# Patient Record
Sex: FEMALE | Race: WHITE | Employment: UNEMPLOYED | URBAN - METROPOLITAN AREA
[De-identification: names, ages, dates, MRNs, and addresses within clinical notes are randomized per-mention and may not be internally consistent; named-entity substitution may affect disease eponyms.]

---

## 2022-07-20 ENCOUNTER — HOSPITAL ENCOUNTER (OUTPATIENT)
Dept: WOUND CARE | Age: 27
Discharge: HOME | End: 2022-07-20
Payer: COMMERCIAL

## 2022-07-20 VITALS
HEART RATE: 96 BPM | DIASTOLIC BLOOD PRESSURE: 76 MMHG | TEMPERATURE: 96 F | RESPIRATION RATE: 16 BRPM | SYSTOLIC BLOOD PRESSURE: 108 MMHG

## 2022-07-20 DIAGNOSIS — E11.621 DIABETIC ULCER OF RIGHT GREAT TOE (HCC): ICD-10-CM

## 2022-07-20 DIAGNOSIS — L97.519 DIABETIC ULCER OF RIGHT GREAT TOE (HCC): ICD-10-CM

## 2022-07-20 DIAGNOSIS — L97.512 CHRONIC ULCER OF GREAT TOE OF RIGHT FOOT WITH FAT LAYER EXPOSED (HCC): ICD-10-CM

## 2022-07-20 PROCEDURE — 99203 OFFICE O/P NEW LOW 30 MIN: CPT | Performed by: NURSE PRACTITIONER

## 2022-07-20 PROCEDURE — 99213 OFFICE O/P EST LOW 20 MIN: CPT

## 2022-07-20 PROCEDURE — 11042 DBRDMT SUBQ TIS 1ST 20SQCM/<: CPT

## 2022-07-20 PROCEDURE — 11042 DBRDMT SUBQ TIS 1ST 20SQCM/<: CPT | Performed by: NURSE PRACTITIONER

## 2022-07-20 RX ORDER — CLOBETASOL PROPIONATE 0.5 MG/G
OINTMENT TOPICAL ONCE
Status: CANCELLED | OUTPATIENT
Start: 2022-07-20 | End: 2022-07-20

## 2022-07-20 RX ORDER — LIDOCAINE HYDROCHLORIDE 20 MG/ML
JELLY TOPICAL ONCE
Status: CANCELLED | OUTPATIENT
Start: 2022-07-20 | End: 2022-07-20

## 2022-07-20 RX ORDER — BETAMETHASONE DIPROPIONATE 0.05 %
OINTMENT (GRAM) TOPICAL ONCE
Status: CANCELLED | OUTPATIENT
Start: 2022-07-20 | End: 2022-07-20

## 2022-07-20 RX ORDER — BACITRACIN ZINC AND POLYMYXIN B SULFATE 500; 1000 [USP'U]/G; [USP'U]/G
OINTMENT TOPICAL ONCE
Status: CANCELLED | OUTPATIENT
Start: 2022-07-20 | End: 2022-07-20

## 2022-07-20 RX ORDER — LIDOCAINE 40 MG/G
CREAM TOPICAL ONCE
Status: CANCELLED | OUTPATIENT
Start: 2022-07-20 | End: 2022-07-20

## 2022-07-20 RX ORDER — GINSENG 100 MG
CAPSULE ORAL ONCE
Status: CANCELLED | OUTPATIENT
Start: 2022-07-20 | End: 2022-07-20

## 2022-07-20 RX ORDER — GENTAMICIN SULFATE 1 MG/G
OINTMENT TOPICAL ONCE
Status: CANCELLED | OUTPATIENT
Start: 2022-07-20 | End: 2022-07-20

## 2022-07-20 RX ORDER — LIDOCAINE 50 MG/G
OINTMENT TOPICAL ONCE
Status: CANCELLED | OUTPATIENT
Start: 2022-07-20 | End: 2022-07-20

## 2022-07-20 RX ORDER — LIDOCAINE HYDROCHLORIDE 40 MG/ML
SOLUTION TOPICAL ONCE
Status: CANCELLED | OUTPATIENT
Start: 2022-07-20 | End: 2022-07-20

## 2022-07-20 RX ORDER — BACITRACIN, NEOMYCIN, POLYMYXIN B 400; 3.5; 5 [USP'U]/G; MG/G; [USP'U]/G
OINTMENT TOPICAL ONCE
Status: CANCELLED | OUTPATIENT
Start: 2022-07-20 | End: 2022-07-20

## 2022-07-20 ASSESSMENT — PAIN - FUNCTIONAL ASSESSMENT: PAIN_FUNCTIONAL_ASSESSMENT: PREVENTS OR INTERFERES SOME ACTIVE ACTIVITIES AND ADLS

## 2022-07-20 ASSESSMENT — PAIN DESCRIPTION - LOCATION: LOCATION: TOE (COMMENT WHICH ONE)

## 2022-07-20 ASSESSMENT — PAIN SCALES - GENERAL: PAINLEVEL_OUTOF10: 4

## 2022-07-20 ASSESSMENT — PAIN DESCRIPTION - ORIENTATION: ORIENTATION: RIGHT

## 2022-07-20 ASSESSMENT — PAIN DESCRIPTION - DESCRIPTORS: DESCRIPTORS: SHARP;BURNING;STABBING

## 2022-07-20 ASSESSMENT — PAIN DESCRIPTION - ONSET: ONSET: ON-GOING

## 2022-07-20 ASSESSMENT — PAIN DESCRIPTION - FREQUENCY: FREQUENCY: INTERMITTENT

## 2022-07-20 ASSESSMENT — PAIN DESCRIPTION - PAIN TYPE: TYPE: ACUTE PAIN

## 2022-07-20 NOTE — DISCHARGE INSTRUCTIONS
PHYSICIAN ORDERS AND DISCHARGE INSTRUCTIONS    NOTE: Upon discharge from the [address], you will receive a patient experience survey. We would be grateful if you would take the time to fill this survey out. Wound care order history:     MAX's   Right       Left    Date:   Cultures:     Grafts:     Antibiotics:        Continuing wound care orders and information:                Residence:                Continue home health care with:    Your wound-care supplies will be provided by: Wound cleansing:     Do not scrub or use excessive force. Wash hands with soap and water before and after dressing changes. Prior to applying a clean dressing, cleanse wound with normal saline, wound cleanser, or mild soap and water. Ask the physician or nurse before getting the wound(s) wet in a shower    Daily Wound management:   Keep weight off wounds and reposition every 2 hours. Avoid standing for long periods of time. Apply wraps/stockings in AM and remove at bedtime. If swelling is present, elevate legs to the level of the heart or above for 30 minutes 4-5 times a day and/or when sitting. When taking antibiotics take entire prescription as ordered by physician do not stop taking until medicine is all gone. Orders for this week: 7/20/22       [facility] [address] [place] [address] in [facility] with soap and water, pat dry. Apply Gentamicin and Stimulen powder to wound bed. Cover with ca alginate and folded 4x4 gauze. Secure with 1\" coban. Change daily. Follow up with Shanda Knox CNP in 1 week in the wound care center. Call [phone] for any questions or concerns.   Date__________   Time____________

## 2022-07-20 NOTE — PROGRESS NOTES
N/A  Severity of pain:  0 / 10   Modifying Factors: diabetes  Associated Signs/Symptoms: erythema, drainage, and numbness        PAST MEDICAL HISTORY        Diagnosis Date    Diabetes mellitus ([facility] [address]        PAST SURGICAL HISTORY    History reviewed. No pertinent surgical history. FAMILY HISTORY    Family History   Problem Relation Age of Onset    Diabetes Father     Heart Disease Father     Depression Maternal Grandmother     Depression Maternal Grandfather     Depression Paternal Grandmother     Depression Paternal Grandfather        SOCIAL HISTORY    Social History     Tobacco Use    Smoking status: Former     Types: Cigarettes     Quit date: 2013     Years since quittin.5    Smokeless tobacco: Never    Tobacco comments:     EDUCATED DM II SLOWS WOUND HEALING   Substance Use Topics    Alcohol use: No    Drug use: No       ALLERGIES    No Known Allergies    MEDICATIONS    Current Outpatient Medications on File Prior to Encounter   Medication Sig Dispense Refill    naproxen (NAPROSYN) 500 MG tablet Take 1 tablet by mouth 2 times daily as needed for Pain 20 tablet 0    acetaminophen (APAP EXTRA STRENGTH) 500 MG tablet Take 1 tablet by mouth every 6 hours as needed for Pain 120 tablet 3     No current facility-administered medications on file prior to encounter.        PROBLEM LIST    Patient Active Problem List   Diagnosis    WD-Diabetic ulcer of right great toe ([facility] [address]    WD-Chronic ulcer of great toe of right foot with fat layer exposed ([facility] [address]       REVIEW OF SYSTEMS    Constitutional: negative for anorexia, chills, fatigue, fevers, malaise, sweats, and weight loss  Respiratory: negative for pleurisy/chest pain, pneumonia, shortness of breath, sputum, stridor, and wheezing  Cardiovascular: negative for near-syncope, orthopnea, palpitations, paroxysmal nocturnal dyspnea, syncope, and tachypnea  Integument/breast: positive for skin lesion(s)      Objective:      /76   Pulse 96   Temp (!) 96 °F (35.6 °C) (Temporal)   Resp 16     PHYSICAL EXAM  General Appearance: alert and oriented to person, place and time, well-developed and well-nourished, in no acute distress  Pulmonary/Chest: clear to auscultation bilaterally- no wheezes, rales or rhonchi, normal air movement, no respiratory distress and no chest wall tenderness  Cardiovascular: normal rate, normal S1 and S2, no gallops, intact distal pulses, and no carotid bruits  Dermatologic exam: Visual inspection of the periwound reveals the skin to be normal in turgor and texture, dry, and coarse  Wound exam: see wound description below in procedure note      Assessment:       Itzel Lopez  appears to have a non-healing wound of the right great toe . The etiology of the wound is felt to be diabetic. There are multiple complicating factors including diabetes, poor glucose control, and obesity. A comprehensive wound management program would be helpful to heal this wound. Assessments completed include fall risk and nutritional, functional,and psychological status. At this time appropriate care would include: periodic debridement and wound care as below. Problem List Items Addressed This Visit          Endocrine    WD-Diabetic ulcer of right great toe ([address]       Other    WD-Chronic ulcer of great toe of right foot with fat layer exposed ([address]         Procedure Note    Indications:  Based on my examination of this patient's wound(s) today, sharp excision into necrotic subcutaneous tissue is required to promote healing and evaluate the extent of previous healing. Performed by: CARMEN Zuniga CNP    Consent obtained: Yes    Time out taken:  Yes    Pain Control: N/A      Debridement:Excisional Debridement    Using #15 blade scalpel the wound(s) was/were sharply debrided down through and including the removal of subcutaneous tissue.         Devitalized Tissue Debrided:  fibrin, biofilm, slough, and callus    Pre Debridement Measurements:  Are located in the Wound Documentation Flow Sheet    All active wounds listed below with today's date are evaluated  Wound(s)    debrided this date include # : 1     Post  Debridement Measurements:  Wound 07/20/22 Toe (Comment  which one) Anterior;Right #1 GR (Active)   Wound Image   07/20/22 1313   Wound Etiology Diabetic 07/20/22 1313   Wound Cleansed Wound cleanser 07/20/22 1313   Wound Length (cm) 2 cm 07/20/22 1313   Wound Width (cm) 0.5 cm 07/20/22 1313   Wound Depth (cm) 0.1 cm 07/20/22 1313   Wound Surface Area (cm^2) 1 cm^2 07/20/22 1313   Wound Volume (cm^3) 0.1 cm^3 07/20/22 1313   Post-Procedure Length (cm) 2 cm 07/20/22 1349   Post-Procedure Width (cm) 0.5 cm 07/20/22 1349   Post-Procedure Depth (cm) 0.1 cm 07/20/22 1349   Post-Procedure Surface Area (cm^2) 1 cm^2 07/20/22 1349   Post-Procedure Volume (cm^3) 0.1 cm^3 07/20/22 1349   Distance Tunneling (cm) 0 cm 07/20/22 1313   Tunneling Position ___ O'Clock 0 07/20/22 1313   Undermining Starts ___ O'Clock 0 07/20/22 1313   Undermining Ends___ O'Clock 0 07/20/22 1313   Undermining Maxium Distance (cm) 0 07/20/22 1313   Wound Assessment Pink/red 07/20/22 1313   Drainage Amount Moderate 07/20/22 1313   Drainage Description Serosanguinous 07/20/22 1313   Odor None 07/20/22 1313   Annika-wound Assessment Dry/flaky 07/20/22 1313   Margins Defined edges 07/20/22 1313   Wound Thickness Description not for Pressure Injury Full thickness 07/20/22 1313   Number of days: 0       Percent of Wound(s) Debrided: 100%    Total  Area  Debrided:  1 sq cm     Bleeding:  Minimal    Hemostasis Achieved:  by pressure    Procedural Pain:  1  / 10     Post Procedural Pain:  0 / 10     Response to treatment:  Well tolerated by patient.     Will add new orders  Patient is ok changing dressing at home  Follow up 1 week and check our progress       Plan:     Discharge Instructions         [address]    NOTE: Upon discharge from the [address], you will receive a patient experience survey. We would be grateful if you would take the time to fill this survey out. Wound care order history:     MAX's   Right       Left    Date:   Cultures:     Grafts:     Antibiotics:        Continuing wound care orders and information:                Residence:                Continue home health care with:    Your wound-care supplies will be provided by: Wound cleansing:     Do not scrub or use excessive force. Wash hands with soap and water before and after dressing changes. Prior to applying a clean dressing, cleanse wound with normal saline, wound cleanser, or mild soap and water. Ask the physician or nurse before getting the wound(s) wet in a shower    Daily Wound management:   Keep weight off wounds and reposition every 2 hours. Avoid standing for long periods of time. Apply wraps/stockings in AM and remove at bedtime. If swelling is present, elevate legs to the level of the heart or above for 30 minutes 4-5 times a day and/or when sitting. When taking antibiotics take entire prescription as ordered by physician do not stop taking until medicine is all gone. Orders for this week: 7/20/22       [facility] [address] [place] [address] in [facility] with soap and water, pat dry. Apply Gentamicin and Stimulen powder to wound bed. Cover with ca alginate and folded 4x4 gauze. Secure with 1\" coban. Change daily. Follow up with Alfredo Murry CNP in 1 week in the wound care center. Call [phone] for any questions or concerns.   Date__________   Time____________          Treatment Note      Written Patient Dismissal Instructions Given          Electronically signed by Agatha Fothergill, APRN - CNP on 7/20/2022 at 1:55 PM

## 2022-07-27 ENCOUNTER — HOSPITAL ENCOUNTER (OUTPATIENT)
Dept: WOUND CARE | Age: 27
Discharge: HOME | End: 2022-07-27
Payer: COMMERCIAL

## 2022-07-27 VITALS — DIASTOLIC BLOOD PRESSURE: 78 MMHG | HEART RATE: 116 BPM | RESPIRATION RATE: 18 BRPM | SYSTOLIC BLOOD PRESSURE: 111 MMHG

## 2022-07-27 DIAGNOSIS — L97.512 CHRONIC ULCER OF GREAT TOE OF RIGHT FOOT WITH FAT LAYER EXPOSED (HCC): ICD-10-CM

## 2022-07-27 DIAGNOSIS — L97.519 DIABETIC ULCER OF RIGHT GREAT TOE (HCC): Primary | ICD-10-CM

## 2022-07-27 DIAGNOSIS — E11.621 DIABETIC ULCER OF RIGHT GREAT TOE (HCC): Primary | ICD-10-CM

## 2022-07-27 PROCEDURE — 11042 DBRDMT SUBQ TIS 1ST 20SQCM/<: CPT

## 2022-07-27 PROCEDURE — 11042 DBRDMT SUBQ TIS 1ST 20SQCM/<: CPT | Performed by: NURSE PRACTITIONER

## 2022-07-27 PROCEDURE — 6370000000 HC RX 637 (ALT 250 FOR IP): Performed by: NURSE PRACTITIONER

## 2022-07-27 RX ORDER — LIDOCAINE HYDROCHLORIDE 20 MG/ML
JELLY TOPICAL ONCE
Status: CANCELLED | OUTPATIENT
Start: 2022-07-27 | End: 2022-07-27

## 2022-07-27 RX ORDER — GINSENG 100 MG
CAPSULE ORAL ONCE
Status: CANCELLED | OUTPATIENT
Start: 2022-07-27 | End: 2022-07-27

## 2022-07-27 RX ORDER — BACITRACIN, NEOMYCIN, POLYMYXIN B 400; 3.5; 5 [USP'U]/G; MG/G; [USP'U]/G
OINTMENT TOPICAL ONCE
Status: CANCELLED | OUTPATIENT
Start: 2022-07-27 | End: 2022-07-27

## 2022-07-27 RX ORDER — CLOBETASOL PROPIONATE 0.5 MG/G
OINTMENT TOPICAL ONCE
Status: CANCELLED | OUTPATIENT
Start: 2022-07-27 | End: 2022-07-27

## 2022-07-27 RX ORDER — GENTAMICIN SULFATE 1 MG/G
OINTMENT TOPICAL ONCE
Status: CANCELLED | OUTPATIENT
Start: 2022-07-27 | End: 2022-07-27

## 2022-07-27 RX ORDER — GENTAMICIN SULFATE 1 MG/G
OINTMENT TOPICAL ONCE
Status: COMPLETED | OUTPATIENT
Start: 2022-07-27 | End: 2022-07-27

## 2022-07-27 RX ORDER — BETAMETHASONE DIPROPIONATE 0.05 %
OINTMENT (GRAM) TOPICAL ONCE
Status: CANCELLED | OUTPATIENT
Start: 2022-07-27 | End: 2022-07-27

## 2022-07-27 RX ORDER — LIDOCAINE HYDROCHLORIDE 40 MG/ML
SOLUTION TOPICAL ONCE
Status: CANCELLED | OUTPATIENT
Start: 2022-07-27 | End: 2022-07-27

## 2022-07-27 RX ORDER — LIDOCAINE 50 MG/G
OINTMENT TOPICAL ONCE
Status: CANCELLED | OUTPATIENT
Start: 2022-07-27 | End: 2022-07-27

## 2022-07-27 RX ORDER — LIDOCAINE 40 MG/G
CREAM TOPICAL ONCE
Status: CANCELLED | OUTPATIENT
Start: 2022-07-27 | End: 2022-07-27

## 2022-07-27 RX ORDER — BACITRACIN ZINC AND POLYMYXIN B SULFATE 500; 1000 [USP'U]/G; [USP'U]/G
OINTMENT TOPICAL ONCE
Status: CANCELLED | OUTPATIENT
Start: 2022-07-27 | End: 2022-07-27

## 2022-07-27 RX ADMIN — GENTAMICIN SULFATE: 1 OINTMENT TOPICAL at 13:34

## 2022-07-27 ASSESSMENT — PAIN DESCRIPTION - ORIENTATION: ORIENTATION: RIGHT

## 2022-07-27 ASSESSMENT — PAIN SCALES - GENERAL: PAINLEVEL_OUTOF10: 0

## 2022-07-27 ASSESSMENT — PAIN DESCRIPTION - LOCATION: LOCATION: TOE (COMMENT WHICH ONE)

## 2022-07-27 NOTE — DISCHARGE INSTRUCTIONS
PHYSICIAN ORDERS AND DISCHARGE INSTRUCTIONS     NOTE: Upon discharge from the [address], you will receive a patient experience survey. We would be grateful if you would take the time to fill this survey out. Wound care order history:                 MAX's   Right       Left               Date:              Cultures:                Grafts:                Antibiotics:           Continuing wound care orders and information:                 Residence:                Continue home health care with:              Your wound-care supplies will be provided by: Wound cleansing:              Do not scrub or use excessive force. Wash hands with soap and water before and after dressing changes. Prior to applying a clean dressing, cleanse wound with normal saline, wound cleanser, or mild soap and water. Ask the physician or nurse before getting the wound(s) wet in a shower     Daily Wound management:              Keep weight off wounds and reposition every 2 hours. Avoid standing for long periods of time. Apply wraps/stockings in AM and remove at bedtime. If swelling is present, elevate legs to the level of the heart or above for 30 minutes 4-5 times a day and/or when sitting. When taking antibiotics take entire prescription as ordered by physician do not stop taking until medicine is all gone. Orders for this week: 7/27/22                  [facility] [address] [place] [address] in [address] with soap and water, pat dry. Apply Gentamicin and Stimulen powder to wound bed. Cover with ca alginate and folded 4x4 gauze. Secure with 1\" coban. Change daily. Follow up with Juan Ingram CNP in 1 week in the wound care center. Call [phone] for any questions or concerns.   Date__________   Time____________

## 2022-07-27 NOTE — PROGRESS NOTES
Wound Care Center Progress Note With Procedure    Fabrizio Rocha  AGE: 32 y.o. GENDER: female  : 1995  EPISODE DATE:  2022     Subjective:     Chief Complaint   Patient presents with    Wound Check     Right toe          HISTORY of PRESENT ILLNESS      Fabrizio Rocha is a 32 y.o. female who presents to the [address] [place] [address] for a visit for evaluation and treatment of Chronic diabetic  ulcer(s) of  Rt. foot hallux. The condition is of moderate severity. The ulcer has been present since 2022. The underlying cause is thought to be diabetes. The patients care to date has included regular wound care in [place] . The patient has significant underlying medical conditions as below. Patient did not get her medications or supplies this week. She is nonetheless improved and wound is improved in size and appearance  We will modify orders and do a better job making sure she gets her supplies and medications     Patient is not a smoker and not on a blood thinner  Patient is a diabetic managed with SubQ insulin and PO meds as well  She moved to this area recently. Was getting outpatient treatment since March of this year  Wound is clean and healthy. Worst phase is past and wound is looking good. Should be able to heal relatively quickly     Diabetes education provided today:     Foot care: advised to wash feet daily, pat dry and apply lotion at night, avoiding between toes. Need to look at feet daily and report to a physician any signs of inflammation or skin damage. Discussed diabetes shoes and socks. Different diabetic medications. Managing high and low sugar readings. Patient educated on the 6 essential components necessary for wound healing: Circulation, Debridements, Proper Dressings and Topical Wound Products, Infection Control, Edema Control and Offloading.      Patient educated on those factors that negatively effect or impact wound healing: smoking, obesity, uncontrolled diabetes, anticoagulant and immunosuppressive regimens, inadequate nutrition, untreated arterial and venous disease if applicable and measures to manage edema. Wound Pain Timing/Severity: none  Quality of pain: N/A  Severity of pain:  0 / 10  Modifying Factors: diabetes  Associated Signs/Symptoms: erythema, drainage, and numbness        PAST MEDICAL HISTORY        Diagnosis Date    Diabetes mellitus ([facility] [address]        PAST SURGICAL HISTORY    History reviewed. No pertinent surgical history. FAMILY HISTORY    Family History   Problem Relation Age of Onset    Diabetes Father     Heart Disease Father     Depression Maternal Grandmother     Depression Maternal Grandfather     Depression Paternal Grandmother     Depression Paternal Grandfather        SOCIAL HISTORY    Social History     Tobacco Use    Smoking status: Former     Types: Cigarettes     Quit date: 2013     Years since quittin.6    Smokeless tobacco: Never    Tobacco comments:     EDUCATED DM II SLOWS WOUND HEALING   Substance Use Topics    Alcohol use: No    Drug use: No       ALLERGIES    No Known Allergies    MEDICATIONS    Current Outpatient Medications on File Prior to Encounter   Medication Sig Dispense Refill    naproxen (NAPROSYN) 500 MG tablet Take 1 tablet by mouth 2 times daily as needed for Pain 20 tablet 0    acetaminophen (APAP EXTRA STRENGTH) 500 MG tablet Take 1 tablet by mouth every 6 hours as needed for Pain 120 tablet 3     No current facility-administered medications on file prior to encounter. REVIEW OF SYSTEMS    Pertinent items are noted in HPI. Constitutional: Negative for systemic symptoms including fever, chills and malaise. Objective:      /78   Pulse (!) 116   Resp 18     PHYSICAL EXAM      General: The patient is in no acute distress. Mental status:  Patient is appropriate, is  oriented to place and plan of care.   Dermatologic exam: Visual inspection of the periwound reveals the skin to be normal in turgor and texture and dry  Wound exam: see wound description below in procedure note      Assessment:     Problem List Items Addressed This Visit          Endocrine    WD-Diabetic ulcer of right great toe ([address] - Primary    Relevant Medications    gentamicin (GARAMYCIN) 0.1 % ointment (Start on 7/27/2022  1:45 PM)    Other Relevant Orders    Initiate Outpatient Wound Care Protocol       Other    WD-Chronic ulcer of great toe of right foot with fat layer exposed ([address]    Relevant Medications    gentamicin (GARAMYCIN) 0.1 % ointment (Start on 7/27/2022  1:45 PM)    Other Relevant Orders    Initiate Outpatient Wound Care Protocol     Procedure Note    Indications:  Based on my examination of this patient's wound(s) today, sharp excision into necrotic subcutaneous tissue is required to promote healing and evaluate the extent of previous healing. Performed by: CARMEN Zuniga CNP    Consent obtained: Yes    Time out taken:  Yes    Pain Control: N/A      Debridement:Excisional Debridement    Using #15 blade scalpel the wound(s) was/were sharply debrided down through and including the removal of subcutaneous tissue.         Devitalized Tissue Debrided:  fibrin, biofilm, slough, and exudate    Pre Debridement Measurements:  Are located in the Wound Documentation Flow Sheet    All active wounds listed below with today's date are evaluated  Wound(s)    debrided this date include # : 1     Post  Debridement Measurements:  Wound 07/20/22 Toe (Comment  which one) Anterior;Right #1 GR (Active)   Wound Image   07/20/22 1313   Wound Etiology Diabetic 07/27/22 1309   Dressing Status New dressing applied 07/20/22 1421   Wound Cleansed Wound cleanser 07/27/22 1309   Offloading for Diabetic Foot Ulcers Offloading ordered 07/27/22 1309   Wound Length (cm) 1.7 cm 07/27/22 1309   Wound Width (cm) 1 cm 07/27/22 1309   Wound Depth (cm) 0.1 cm 07/27/22 1309   Wound Surface Area (cm^2) 1.7 cm^2 07/27/22 1309   Change in Wound Size % (l*w) -70 07/27/22 1309   Wound Volume (cm^3) 0.17 cm^3 07/27/22 1309   Wound Healing % -70 07/27/22 1309   Post-Procedure Length (cm) 1.7 cm 07/27/22 1319   Post-Procedure Width (cm) 0.9 cm 07/27/22 1319   Post-Procedure Depth (cm) 0.1 cm 07/27/22 1319   Post-Procedure Surface Area (cm^2) 1.53 cm^2 07/27/22 1319   Post-Procedure Volume (cm^3) 0.153 cm^3 07/27/22 1319   Distance Tunneling (cm) 0 cm 07/27/22 1309   Tunneling Position ___ O'Clock 0 07/27/22 1309   Undermining Starts ___ O'Clock 0 07/27/22 1309   Undermining Ends___ O'Clock 0 07/27/22 1309   Undermining Maxium Distance (cm) 0 07/27/22 1309   Wound Assessment Pink/red 07/27/22 1309   Drainage Amount Moderate 07/27/22 1309   Drainage Description Serosanguinous 07/27/22 1309   Odor None 07/27/22 1309   Annika-wound Assessment Dry/flaky 07/27/22 1309   Margins Defined edges 07/27/22 1309   Wound Thickness Description not for Pressure Injury Full thickness 07/27/22 1309   Number of days: 7       Percent of Wound(s) Debrided: approximately 100%    Total  Area  Debrided:  1.53 sq cm     Bleeding:  Minimal    Hemostasis Achieved:  by pressure    Procedural Pain:  0  / 10     Post Procedural Pain:  0 / 10     Response to treatment:  Well tolerated by patient. Status of wound progress and description from last visit:   Stable. Sending patient home with leftovers. She has no issues changing dressing at home       Plan:       Discharge Instructions         [address]     NOTE: Upon discharge from the [address], you will receive a patient experience survey. We would be grateful if you would take the time to fill this survey out.      Wound care order history:                 MAX's   Right       Left               Date:              Cultures:                Grafts:                Antibiotics:           Continuing wound care orders and information:                 Residence:                Continue home health care with:              Your wound-care supplies will be provided by: Wound cleansing:              Do not scrub or use excessive force. Wash hands with soap and water before and after dressing changes. Prior to applying a clean dressing, cleanse wound with normal saline, wound cleanser, or mild soap and water. Ask the physician or nurse before getting the wound(s) wet in a shower     Daily Wound management:              Keep weight off wounds and reposition every 2 hours. Avoid standing for long periods of time. Apply wraps/stockings in AM and remove at bedtime. If swelling is present, elevate legs to the level of the heart or above for 30 minutes 4-5 times a day and/or when sitting. When taking antibiotics take entire prescription as ordered by physician do not stop taking until medicine is all gone. Orders for this week: 7/27/22                  [facility] [address] [place] [address] in [address] with soap and water, pat dry. Apply Gentamicin and Stimulen powder to wound bed. Cover with ca alginate and folded 4x4 gauze. Secure with 1\" coban. Change daily. Follow up with Brenda Reed CNP in 1 week in the wound care center. Call [phone] for any questions or concerns.   Date__________   Time____________        Treatment Note      Written Patient Dismissal Instructions Given            Electronically signed by CARMEN Robison CNP on 7/27/2022 at 1:33 PM

## 2022-08-03 ENCOUNTER — HOSPITAL ENCOUNTER (OUTPATIENT)
Dept: WOUND CARE | Age: 27
Discharge: HOME | End: 2022-08-03
Payer: COMMERCIAL

## 2022-08-03 VITALS
RESPIRATION RATE: 20 BRPM | SYSTOLIC BLOOD PRESSURE: 136 MMHG | TEMPERATURE: 97 F | HEART RATE: 128 BPM | DIASTOLIC BLOOD PRESSURE: 78 MMHG

## 2022-08-03 DIAGNOSIS — L97.519 DIABETIC ULCER OF RIGHT GREAT TOE (HCC): ICD-10-CM

## 2022-08-03 DIAGNOSIS — E11.621 DIABETIC ULCER OF RIGHT GREAT TOE (HCC): ICD-10-CM

## 2022-08-03 DIAGNOSIS — L97.512 CHRONIC ULCER OF GREAT TOE OF RIGHT FOOT WITH FAT LAYER EXPOSED (HCC): Primary | ICD-10-CM

## 2022-08-03 PROCEDURE — 11042 DBRDMT SUBQ TIS 1ST 20SQCM/<: CPT

## 2022-08-03 PROCEDURE — 11042 DBRDMT SUBQ TIS 1ST 20SQCM/<: CPT | Performed by: NURSE PRACTITIONER

## 2022-08-03 RX ORDER — LIDOCAINE HYDROCHLORIDE 40 MG/ML
SOLUTION TOPICAL ONCE
Status: CANCELLED | OUTPATIENT
Start: 2022-08-03 | End: 2022-08-03

## 2022-08-03 RX ORDER — GINSENG 100 MG
CAPSULE ORAL ONCE
Status: CANCELLED | OUTPATIENT
Start: 2022-08-03 | End: 2022-08-03

## 2022-08-03 RX ORDER — LIDOCAINE HYDROCHLORIDE 20 MG/ML
JELLY TOPICAL ONCE
Status: CANCELLED | OUTPATIENT
Start: 2022-08-03 | End: 2022-08-03

## 2022-08-03 RX ORDER — GENTAMICIN SULFATE 1 MG/G
OINTMENT TOPICAL ONCE
Status: CANCELLED | OUTPATIENT
Start: 2022-08-03 | End: 2022-08-03

## 2022-08-03 RX ORDER — BACITRACIN, NEOMYCIN, POLYMYXIN B 400; 3.5; 5 [USP'U]/G; MG/G; [USP'U]/G
OINTMENT TOPICAL ONCE
Status: CANCELLED | OUTPATIENT
Start: 2022-08-03 | End: 2022-08-03

## 2022-08-03 RX ORDER — BETAMETHASONE DIPROPIONATE 0.05 %
OINTMENT (GRAM) TOPICAL ONCE
Status: CANCELLED | OUTPATIENT
Start: 2022-08-03 | End: 2022-08-03

## 2022-08-03 RX ORDER — BACITRACIN ZINC AND POLYMYXIN B SULFATE 500; 1000 [USP'U]/G; [USP'U]/G
OINTMENT TOPICAL ONCE
Status: CANCELLED | OUTPATIENT
Start: 2022-08-03 | End: 2022-08-03

## 2022-08-03 RX ORDER — CLOBETASOL PROPIONATE 0.5 MG/G
OINTMENT TOPICAL ONCE
Status: CANCELLED | OUTPATIENT
Start: 2022-08-03 | End: 2022-08-03

## 2022-08-03 RX ORDER — LIDOCAINE 40 MG/G
CREAM TOPICAL ONCE
Status: CANCELLED | OUTPATIENT
Start: 2022-08-03 | End: 2022-08-03

## 2022-08-03 RX ORDER — GENTAMICIN SULFATE 1 MG/G
OINTMENT TOPICAL ONCE
Status: DISCONTINUED | OUTPATIENT
Start: 2022-08-03 | End: 2022-08-04 | Stop reason: HOSPADM

## 2022-08-03 RX ORDER — LIDOCAINE 50 MG/G
OINTMENT TOPICAL ONCE
Status: CANCELLED | OUTPATIENT
Start: 2022-08-03 | End: 2022-08-03

## 2022-08-03 NOTE — DISCHARGE INSTRUCTIONS
PHYSICIAN ORDERS AND DISCHARGE INSTRUCTIONS     NOTE: Upon discharge from the [address], you will receive a patient experience survey. We would be grateful if you would take the time to fill this survey out. Wound care order history:                 MAX's   Right       Left               Date:              Cultures:                Grafts:                Antibiotics:           Continuing wound care orders and information:                 Residence:                Continue home health care with:              Your wound-care supplies will be provided by: Wound cleansing:              Do not scrub or use excessive force. Wash hands with soap and water before and after dressing changes. Prior to applying a clean dressing, cleanse wound with normal saline, wound cleanser, or mild soap and water. Ask the physician or nurse before getting the wound(s) wet in a shower     Daily Wound management:              Keep weight off wounds and reposition every 2 hours. Avoid standing for long periods of time. Apply wraps/stockings in AM and remove at bedtime. If swelling is present, elevate legs to the level of the heart or above for 30 minutes 4-5 times a day and/or when sitting. When taking antibiotics take entire prescription as ordered by physician do not stop taking until medicine is all gone. Orders for this week: 8/3/22                  [facility] [address] [place] [address] in [facility]     Right [facility] (including tip) - Wash with soap and water, pat dry. Apply Gentamicin and Stimulen powder to wound bed. Cover with ca alginate and folded 4x4 gauze. Secure with 1\" coban. Change daily. Follow up with Raffaele Bills CNP in 1 week in the wound care center. Call [phone] for any questions or concerns.   Date__________   Time____________

## 2022-08-10 ENCOUNTER — HOSPITAL ENCOUNTER (OUTPATIENT)
Dept: WOUND CARE | Age: 27
Discharge: HOME | End: 2022-08-10
Payer: COMMERCIAL

## 2022-08-10 VITALS
SYSTOLIC BLOOD PRESSURE: 105 MMHG | RESPIRATION RATE: 20 BRPM | TEMPERATURE: 97.1 F | DIASTOLIC BLOOD PRESSURE: 62 MMHG | HEART RATE: 114 BPM

## 2022-08-10 DIAGNOSIS — E11.621 DIABETIC ULCER OF RIGHT GREAT TOE (HCC): Primary | ICD-10-CM

## 2022-08-10 DIAGNOSIS — L97.519 DIABETIC ULCER OF RIGHT GREAT TOE (HCC): Primary | ICD-10-CM

## 2022-08-10 DIAGNOSIS — L97.512 CHRONIC ULCER OF GREAT TOE OF RIGHT FOOT WITH FAT LAYER EXPOSED (HCC): ICD-10-CM

## 2022-08-10 PROCEDURE — 11042 DBRDMT SUBQ TIS 1ST 20SQCM/<: CPT | Performed by: NURSE PRACTITIONER

## 2022-08-10 PROCEDURE — 11042 DBRDMT SUBQ TIS 1ST 20SQCM/<: CPT

## 2022-08-10 RX ORDER — LIDOCAINE 40 MG/G
CREAM TOPICAL ONCE
Status: CANCELLED | OUTPATIENT
Start: 2022-08-10 | End: 2022-08-10

## 2022-08-10 RX ORDER — BETAMETHASONE DIPROPIONATE 0.05 %
OINTMENT (GRAM) TOPICAL ONCE
Status: CANCELLED | OUTPATIENT
Start: 2022-08-10 | End: 2022-08-10

## 2022-08-10 RX ORDER — LIDOCAINE HYDROCHLORIDE 40 MG/ML
SOLUTION TOPICAL ONCE
Status: CANCELLED | OUTPATIENT
Start: 2022-08-10 | End: 2022-08-10

## 2022-08-10 RX ORDER — BACITRACIN ZINC AND POLYMYXIN B SULFATE 500; 1000 [USP'U]/G; [USP'U]/G
OINTMENT TOPICAL ONCE
Status: CANCELLED | OUTPATIENT
Start: 2022-08-10 | End: 2022-08-10

## 2022-08-10 RX ORDER — CLOBETASOL PROPIONATE 0.5 MG/G
OINTMENT TOPICAL ONCE
Status: CANCELLED | OUTPATIENT
Start: 2022-08-10 | End: 2022-08-10

## 2022-08-10 RX ORDER — LIDOCAINE HYDROCHLORIDE 20 MG/ML
JELLY TOPICAL ONCE
Status: CANCELLED | OUTPATIENT
Start: 2022-08-10 | End: 2022-08-10

## 2022-08-10 RX ORDER — BACITRACIN, NEOMYCIN, POLYMYXIN B 400; 3.5; 5 [USP'U]/G; MG/G; [USP'U]/G
OINTMENT TOPICAL ONCE
Status: CANCELLED | OUTPATIENT
Start: 2022-08-10 | End: 2022-08-10

## 2022-08-10 RX ORDER — LIDOCAINE 50 MG/G
OINTMENT TOPICAL ONCE
Status: CANCELLED | OUTPATIENT
Start: 2022-08-10 | End: 2022-08-10

## 2022-08-10 RX ORDER — GINSENG 100 MG
CAPSULE ORAL ONCE
Status: CANCELLED | OUTPATIENT
Start: 2022-08-10 | End: 2022-08-10

## 2022-08-10 RX ORDER — GENTAMICIN SULFATE 1 MG/G
OINTMENT TOPICAL ONCE
Status: CANCELLED | OUTPATIENT
Start: 2022-08-10 | End: 2022-08-10

## 2022-08-10 ASSESSMENT — PAIN SCALES - GENERAL: PAINLEVEL_OUTOF10: 0

## 2022-08-10 NOTE — PROGRESS NOTES
Wound Care Center Progress Note With Procedure    Durga Bishop  AGE: 32 y.o. GENDER: female  : 1995  EPISODE DATE:  8/10/2022     Subjective:     Chief Complaint   Patient presents with    Wound Check     Right grt toe         HISTORY of PRESENT ILLNESS     Durga Bishop is a 32 y.o. female who presents to the [address] [place] [address] for a visit for evaluation and treatment of Chronic diabetic  ulcer(s) of  Rt. foot hallux. The condition is of moderate severity. The ulcer has been present since 2022. The underlying cause is thought to be diabetes. The patients care to date has included regular wound care in [place] . The patient has significant underlying medical conditions as below. Great improvement this week. No issues to report. Much smaller in size     Patient is not a smoker and not on a blood thinner  Patient is a diabetic managed with SubQ insulin and PO meds as well  She moved to this area recently. Was getting outpatient treatment since March of this year  Wound is clean and healthy. Worst phase is past and wound is looking good. Should be able to heal relatively quickly     Diabetes education provided today:     Foot care: advised to wash feet daily, pat dry and apply lotion at night, avoiding between toes. Need to look at feet daily and report to a physician any signs of inflammation or skin damage. Discussed diabetes shoes and socks. Different diabetic medications. Managing high and low sugar readings. Patient educated on the 6 essential components necessary for wound healing: Circulation, Debridements, Proper Dressings and Topical Wound Products, Infection Control, Edema Control and Offloading.      Patient educated on those factors that negatively effect or impact wound healing: smoking, obesity, uncontrolled diabetes, anticoagulant and immunosuppressive regimens, inadequate nutrition, untreated arterial and venous disease if applicable and measures to manage edema. Wound Pain Timing/Severity: none  Quality of pain: N/A  Severity of pain:  0 / 10  Modifying Factors: diabetes  Associated Signs/Symptoms: erythema, drainage, and numbness        PAST MEDICAL HISTORY        Diagnosis Date    Diabetes mellitus ([address]        PAST SURGICAL HISTORY    History reviewed. No pertinent surgical history. FAMILY HISTORY    Family History   Problem Relation Age of Onset    Diabetes Father     Heart Disease Father     Depression Maternal Grandmother     Depression Maternal Grandfather     Depression Paternal Grandmother     Depression Paternal Grandfather        SOCIAL HISTORY    Social History     Tobacco Use    Smoking status: Former     Types: Cigarettes     Quit date: 2013     Years since quittin.6    Smokeless tobacco: Never    Tobacco comments:     EDUCATED DM II SLOWS WOUND HEALING   Substance Use Topics    Alcohol use: No    Drug use: No       ALLERGIES    No Known Allergies    MEDICATIONS    Current Outpatient Medications on File Prior to Encounter   Medication Sig Dispense Refill    naproxen (NAPROSYN) 500 MG tablet Take 1 tablet by mouth 2 times daily as needed for Pain 20 tablet 0    acetaminophen (APAP EXTRA STRENGTH) 500 MG tablet Take 1 tablet by mouth every 6 hours as needed for Pain 120 tablet 3     No current facility-administered medications on file prior to encounter. REVIEW OF SYSTEMS    Pertinent items are noted in HPI. Constitutional: Negative for systemic symptoms including fever, chills and malaise. Objective:      /62   Pulse (!) 114   Temp 97.1 °F (36.2 °C) (Temporal)   Resp 20     PHYSICAL EXAM      General: The patient is in no acute distress. Mental status:  Patient is appropriate, is  oriented to place and plan of care.   Dermatologic exam: Visual inspection of the periwound reveals the skin to be normal in turgor and texture and dry  Wound exam: see wound description below in procedure note      Assessment: Problem List Items Addressed This Visit          Endocrine    WD-Diabetic ulcer of right great toe ([facility] - Primary    Relevant Orders    Initiate Outpatient Wound Care Protocol       Other    WD-Chronic ulcer of great toe of right foot with fat layer exposed ([facility]    Relevant Orders    Initiate Outpatient Wound Care Protocol     Procedure Note    Indications:  Based on my examination of this patient's wound(s) today, sharp excision into necrotic subcutaneous tissue is required to promote healing and evaluate the extent of previous healing. Performed by: CARMEN Robison CNP    Consent obtained: Yes    Time out taken:  Yes    Pain Control: N/A      Debridement:Excisional Debridement    Using scissors and forceps the wound(s) was/were sharply debrided down through and including the removal of subcutaneous tissue.         Devitalized Tissue Debrided:  fibrin, biofilm, slough, and callus    Pre Debridement Measurements:  Are located in the Wound Documentation Flow Sheet    All active wounds listed below with today's date are evaluated  Wound(s)    debrided this date include # : 1     Post  Debridement Measurements:  Wound 07/20/22 Toe (Comment  which one) Anterior;Right #1 GR (Active)   Wound Image   08/03/22 1309   Wound Etiology Diabetic 08/10/22 1317   Dressing Status New dressing applied 08/03/22 1337   Wound Cleansed Wound cleanser 08/10/22 1317   Offloading for Diabetic Foot Ulcers Offloading ordered 08/10/22 1317   Wound Length (cm) 0.9 cm 08/10/22 1317   Wound Width (cm) 0.5 cm 08/10/22 1317   Wound Depth (cm) 0.1 cm 08/10/22 1317   Wound Surface Area (cm^2) 0.45 cm^2 08/10/22 1317   Change in Wound Size % (l*w) 55 08/10/22 1317   Wound Volume (cm^3) 0.045 cm^3 08/10/22 1317   Wound Healing % 55 08/10/22 1317   Post-Procedure Length (cm) 0.9 cm 08/10/22 1334   Post-Procedure Width (cm) 0.5 cm 08/10/22 1334   Post-Procedure Depth (cm) 0.1 cm 08/10/22 1334   Post-Procedure Surface Area (cm^2) 0.45 cm^2 08/10/22 1334   Post-Procedure Volume (cm^3) 0.045 cm^3 08/10/22 1334   Distance Tunneling (cm) 0 cm 08/10/22 1317   Tunneling Position ___ O'Clock 0 08/10/22 1317   Undermining Starts ___ O'Clock 0 08/10/22 1317   Undermining Ends___ O'Clock 0 08/10/22 1317   Undermining Maxium Distance (cm) 0 08/10/22 1317   Wound Assessment Pink/red 08/10/22 1317   Drainage Amount Small 08/10/22 1317   Drainage Description Serosanguinous 08/10/22 1317   Odor None 08/10/22 1317   Annika-wound Assessment Intact; Hyperkeratosis (callous) 08/10/22 1317   Margins Defined edges 08/10/22 1317   Wound Thickness Description not for Pressure Injury Full thickness 08/10/22 1317   Number of days: 21       Percent of Wound(s) Debrided: approximately 100%    Total  Area  Debrided:  0.45 sq cm     Bleeding:  Minimal    Hemostasis Achieved:  by pressure    Procedural Pain:  0  / 10     Post Procedural Pain:  0 / 10     Response to treatment:  Well tolerated by patient. Status of wound progress and description from last visit:   Much improved. Continue plan of care for now and follow up 1 week         Plan:       Discharge Instructions           Discharge Instructions           [address]     NOTE: Upon discharge from the [address], you will receive a patient experience survey. We would be grateful if you would take the time to fill this survey out. Wound care order history:                 MAX's   Right       Left               Date:              Cultures:                Grafts:                Antibiotics:           Continuing wound care orders and information:                 Residence:                Continue home health care with:              Your wound-care supplies will be provided by: Wound cleansing:              Do not scrub or use excessive force. Wash hands with soap and water before and after dressing changes.               Prior to applying a clean dressing, cleanse wound with normal saline, wound cleanser, or mild soap and water. Ask the physician or nurse before getting the wound(s) wet in a shower     Daily Wound management:              Keep weight off wounds and reposition every 2 hours. Avoid standing for long periods of time. Apply wraps/stockings in AM and remove at bedtime. If swelling is present, elevate legs to the level of the heart or above for 30 minutes 4-5 times a day and/or when sitting. When taking antibiotics take entire prescription as ordered by physician do not stop taking until medicine is all gone. Orders for this week: 8/10/22                  [facility] on [address] [place] [address] in [facility]     Right [place] Toe (including tip) - Wash with soap and water, pat dry. Apply Gentamicin and Stimulen powder to wound bed. Cover with ca alginate and folded 4x4 gauze. Secure with 1\" coban. Change daily. Follow up with Marie Pitt CNP in 1 week in the wound care center. Call [phone] for any questions or concerns.   Date__________   Time____________              Treatment Note      Written Patient Dismissal Instructions Given            Electronically signed by CARMEN Alegria CNP on 8/10/2022 at 1:39 PM

## 2022-08-10 NOTE — DISCHARGE INSTRUCTIONS
Discharge Instructions           PHYSICIAN ORDERS AND DISCHARGE INSTRUCTIONS     NOTE: Upon discharge from the [address], you will receive a patient experience survey. We would be grateful if you would take the time to fill this survey out. Wound care order history:                 MAX's   Right       Left               Date:              Cultures:                Grafts:                Antibiotics:           Continuing wound care orders and information:                 Residence:                Continue home health care with:              Your wound-care supplies will be provided by: Wound cleansing:              Do not scrub or use excessive force. Wash hands with soap and water before and after dressing changes. Prior to applying a clean dressing, cleanse wound with normal saline, wound cleanser, or mild soap and water. Ask the physician or nurse before getting the wound(s) wet in a shower     Daily Wound management:              Keep weight off wounds and reposition every 2 hours. Avoid standing for long periods of time. Apply wraps/stockings in AM and remove at bedtime. If swelling is present, elevate legs to the level of the heart or above for 30 minutes 4-5 times a day and/or when sitting. When taking antibiotics take entire prescription as ordered by physician do not stop taking until medicine is all gone. Orders for this week: 8/10/22                  [facility]     Right [place] Toe (including tip) - Wash with soap and water, pat dry. Apply Gentamicin and Stimulen powder to wound bed. Cover with ca alginate and folded 4x4 gauze. Secure with 1\" coban. Change daily. Follow up with Porfirio Pitt CNP in 1 week in the wound care center.   Call [phone] for any questions or

## 2022-08-17 ENCOUNTER — HOSPITAL ENCOUNTER (OUTPATIENT)
Dept: WOUND CARE | Age: 27
Discharge: HOME | End: 2022-08-17
Payer: COMMERCIAL

## 2022-08-17 VITALS
HEART RATE: 109 BPM | TEMPERATURE: 97.1 F | RESPIRATION RATE: 20 BRPM | SYSTOLIC BLOOD PRESSURE: 119 MMHG | DIASTOLIC BLOOD PRESSURE: 83 MMHG

## 2022-08-17 DIAGNOSIS — E11.621 DIABETIC ULCER OF RIGHT GREAT TOE (HCC): Primary | ICD-10-CM

## 2022-08-17 DIAGNOSIS — L97.519 DIABETIC ULCER OF RIGHT GREAT TOE (HCC): Primary | ICD-10-CM

## 2022-08-17 DIAGNOSIS — L97.512 CHRONIC ULCER OF GREAT TOE OF RIGHT FOOT WITH FAT LAYER EXPOSED (HCC): ICD-10-CM

## 2022-08-17 PROCEDURE — 11042 DBRDMT SUBQ TIS 1ST 20SQCM/<: CPT

## 2022-08-17 PROCEDURE — 11042 DBRDMT SUBQ TIS 1ST 20SQCM/<: CPT | Performed by: NURSE PRACTITIONER

## 2022-08-17 RX ORDER — LIDOCAINE HYDROCHLORIDE 20 MG/ML
JELLY TOPICAL ONCE
Status: CANCELLED | OUTPATIENT
Start: 2022-08-17 | End: 2022-08-17

## 2022-08-17 RX ORDER — BACITRACIN ZINC AND POLYMYXIN B SULFATE 500; 1000 [USP'U]/G; [USP'U]/G
OINTMENT TOPICAL ONCE
Status: CANCELLED | OUTPATIENT
Start: 2022-08-17 | End: 2022-08-17

## 2022-08-17 RX ORDER — GENTAMICIN SULFATE 1 MG/G
OINTMENT TOPICAL ONCE
Status: DISCONTINUED | OUTPATIENT
Start: 2022-08-17 | End: 2022-08-18 | Stop reason: HOSPADM

## 2022-08-17 RX ORDER — LIDOCAINE HYDROCHLORIDE 40 MG/ML
SOLUTION TOPICAL ONCE
Status: CANCELLED | OUTPATIENT
Start: 2022-08-17 | End: 2022-08-17

## 2022-08-17 RX ORDER — BETAMETHASONE DIPROPIONATE 0.05 %
OINTMENT (GRAM) TOPICAL ONCE
Status: CANCELLED | OUTPATIENT
Start: 2022-08-17 | End: 2022-08-17

## 2022-08-17 RX ORDER — CLOBETASOL PROPIONATE 0.5 MG/G
OINTMENT TOPICAL ONCE
Status: CANCELLED | OUTPATIENT
Start: 2022-08-17 | End: 2022-08-17

## 2022-08-17 RX ORDER — GENTAMICIN SULFATE 1 MG/G
OINTMENT TOPICAL ONCE
Status: CANCELLED | OUTPATIENT
Start: 2022-08-17 | End: 2022-08-17

## 2022-08-17 RX ORDER — LIDOCAINE 50 MG/G
OINTMENT TOPICAL ONCE
Status: CANCELLED | OUTPATIENT
Start: 2022-08-17 | End: 2022-08-17

## 2022-08-17 RX ORDER — BACITRACIN, NEOMYCIN, POLYMYXIN B 400; 3.5; 5 [USP'U]/G; MG/G; [USP'U]/G
OINTMENT TOPICAL ONCE
Status: CANCELLED | OUTPATIENT
Start: 2022-08-17 | End: 2022-08-17

## 2022-08-17 RX ORDER — GINSENG 100 MG
CAPSULE ORAL ONCE
Status: CANCELLED | OUTPATIENT
Start: 2022-08-17 | End: 2022-08-17

## 2022-08-17 RX ORDER — LIDOCAINE 40 MG/G
CREAM TOPICAL ONCE
Status: CANCELLED | OUTPATIENT
Start: 2022-08-17 | End: 2022-08-17

## 2022-08-17 NOTE — DISCHARGE INSTRUCTIONS
PHYSICIAN ORDERS AND DISCHARGE INSTRUCTIONS     NOTE: Upon discharge from the [address], you will receive a patient experience survey. We would be grateful if you would take the time to fill this survey out. Wound care order history:                 MAX's   Right       Left               Date:              Cultures:                Grafts:                Antibiotics:           Continuing wound care orders and information:                 Residence:                Continue home health care with:              Your wound-care supplies will be provided by: Wound cleansing:              Do not scrub or use excessive force. Wash hands with soap and water before and after dressing changes. Prior to applying a clean dressing, cleanse wound with normal saline, wound cleanser, or mild soap and water. Ask the physician or nurse before getting the wound(s) wet in a shower     Daily Wound management:              Keep weight off wounds and reposition every 2 hours. Avoid standing for long periods of time. Apply wraps/stockings in AM and remove at bedtime. If swelling is present, elevate legs to the level of the heart or above for 30 minutes 4-5 times a day and/or when sitting. When taking antibiotics take entire prescription as ordered by physician do not stop taking until medicine is all gone. Orders for this week: 8/17/22                  [facility] [address] [place] [address] in [address] with soap and water, pat dry. Apply Gentamicin and Stimulen powder to wound bed. Cover with ca alginate and folded 4x4 gauze. Secure with 1\" coban. Change daily. Follow up with Cynthia Gamble CNP in 1 week in the wound care center. Call [phone] for any questions or concerns.   Date__________   Time____________

## 2022-08-17 NOTE — PROGRESS NOTES
Wound Care Center Progress Note With Procedure    Shine Israel  AGE: 32 y.o. GENDER: female  : 1995  EPISODE DATE:  2022     Subjective:     Chief Complaint   Patient presents with    Wound Check     Rt gr toe         HISTORY of PRESENT ILLNESS     Shine Israel is a 32 y.o. female who presents to the [address] [place] [address] for a visit for evaluation and treatment of Chronic diabetic  ulcer(s) of  Rt. foot hallux. The condition is of moderate severity. The ulcer has been present since 2022. The underlying cause is thought to be diabetes. The patients care to date has included regular wound care in [place] . The patient has significant underlying medical conditions as below. Much smaller this week and nearly closed  Hope to discharge in the next weeks     Foot care: advised to wash feet daily, pat dry and apply lotion at night, avoiding between toes. Need to look at feet daily and report to a physician any signs of inflammation or skin damage. Discussed diabetes shoes and socks. Different diabetic medications. Managing high and low sugar readings. Patient educated on the 6 essential components necessary for wound healing: Circulation, Debridements, Proper Dressings and Topical Wound Products, Infection Control, Edema Control and Offloading. Patient educated on those factors that negatively effect or impact wound healing: smoking, obesity, uncontrolled diabetes, anticoagulant and immunosuppressive regimens, inadequate nutrition, untreated arterial and venous disease if applicable and measures to manage edema. Wound Pain Timing/Severity: none  Quality of pain: N/A  Severity of pain:  0 / 10  Modifying Factors: diabetes  Associated Signs/Symptoms: erythema, drainage, and numbness        PAST MEDICAL HISTORY        Diagnosis Date    Diabetes mellitus ([address]        PAST SURGICAL HISTORY    History reviewed. No pertinent surgical history.     FAMILY HISTORY    Family History   Problem Relation Age of Onset    Diabetes Father     Heart Disease Father     Depression Maternal Grandmother     Depression Maternal Grandfather     Depression Paternal Grandmother     Depression Paternal Grandfather        SOCIAL HISTORY    Social History     Tobacco Use    Smoking status: Former     Types: Cigarettes     Quit date: 2013     Years since quittin.6    Smokeless tobacco: Never    Tobacco comments:     EDUCATED DM II SLOWS WOUND HEALING   Substance Use Topics    Alcohol use: No    Drug use: No       ALLERGIES    No Known Allergies    MEDICATIONS    Current Outpatient Medications on File Prior to Encounter   Medication Sig Dispense Refill    naproxen (NAPROSYN) 500 MG tablet Take 1 tablet by mouth 2 times daily as needed for Pain 20 tablet 0    acetaminophen (APAP EXTRA STRENGTH) 500 MG tablet Take 1 tablet by mouth every 6 hours as needed for Pain 120 tablet 3     No current facility-administered medications on file prior to encounter. REVIEW OF SYSTEMS    Pertinent items are noted in HPI. Constitutional: Negative for systemic symptoms including fever, chills and malaise. Objective:      /83   Pulse (!) 109   Temp 97.1 °F (36.2 °C) (Temporal)   Resp 20     PHYSICAL EXAM      General: The patient is in no acute distress. Mental status:  Patient is appropriate, is  oriented to place and plan of care.   Dermatologic exam: Visual inspection of the periwound reveals the skin to be normal in turgor and texture and dry  Wound exam: see wound description below in procedure note      Assessment:     Problem List Items Addressed This Visit          Endocrine    WD-Diabetic ulcer of right great toe ([address] - Primary    Relevant Medications    gentamicin (GARAMYCIN) 0.1 % ointment (Start on 2022  2:15 PM)    Other Relevant Orders    Initiate Outpatient Wound Care Protocol       Other    WD-Chronic ulcer of great toe of right foot with fat layer exposed ([address] 08/17/22 1322   Undermining Ends___ O'Clock 0 08/17/22 1322   Undermining Maxium Distance (cm) 0 08/17/22 1322   Wound Assessment Susan Moore/red;Slough 08/17/22 1322   Drainage Amount Small 08/17/22 1322   Drainage Description Serosanguinous 08/17/22 1322   Odor None 08/17/22 1322   Annika-wound Assessment Hyperkeratosis (callous) 08/17/22 1322   Margins Defined edges 08/17/22 1322   Wound Thickness Description not for Pressure Injury Full thickness 08/17/22 1322   Number of days: 28       Percent of Wound(s) Debrided: approximately 100%    Total  Area  Debrided:  0.21 sq cm     Bleeding:  Minimal    Hemostasis Achieved:  by pressure    Procedural Pain:  0  / 10     Post Procedural Pain:  0 / 10     Response to treatment:  Well tolerated by patient. Status of wound progress and description from last visit:   Close to healing. Continue plan of care for now and follow up 1 week  Hope to discharge at that time      Plan:       Discharge Instructions         [address]     NOTE: Upon discharge from the [address], you will receive a patient experience survey. We would be grateful if you would take the time to fill this survey out. Wound care order history:                 MAX's   Right       Left               Date:              Cultures:                Grafts:                Antibiotics:           Continuing wound care orders and information:                 Residence:                Continue home health care with:              Your wound-care supplies will be provided by: Wound cleansing:              Do not scrub or use excessive force. Wash hands with soap and water before and after dressing changes. Prior to applying a clean dressing, cleanse wound with normal saline, wound cleanser, or mild soap and water.               Ask the physician or nurse before getting the wound(s) wet in a shower     Daily Wound management:              Keep weight off wounds and reposition every 2 hours. Avoid standing for long periods of time. Apply wraps/stockings in AM and remove at bedtime. If swelling is present, elevate legs to the level of the heart or above for 30 minutes 4-5 times a day and/or when sitting. When taking antibiotics take entire prescription as ordered by physician do not stop taking until medicine is all gone. Orders for this week: 8/17/22                  [facility] in [address] with soap and water, pat dry. Apply Gentamicin and Stimulen powder to wound bed. Cover with ca alginate and folded 4x4 gauze. Secure with 1\" coban. Change daily. Follow up with Marie Pitt CNP in 1 week in the wound care center. Call [phone] for any questions or concerns.   Date__________   Time____________        Treatment Note      Written Patient Dismissal Instructions Given            Electronically signed by CARMEN Alegria CNP on 8/17/2022 at 1:50 PM

## 2022-08-24 ENCOUNTER — HOSPITAL ENCOUNTER (OUTPATIENT)
Dept: WOUND CARE | Age: 27
Discharge: HOME | End: 2022-08-24

## 2022-08-24 NOTE — DISCHARGE INSTRUCTIONS
PHYSICIAN ORDERS AND DISCHARGE INSTRUCTIONS     NOTE: Upon discharge from the [address], you will receive a patient experience survey. We would be grateful if you would take the time to fill this survey out. Wound care order history:                 MAX's   Right       Left               Date:              Cultures:                Grafts:                Antibiotics:           Continuing wound care orders and information:                 Residence:                Continue home health care with:              Your wound-care supplies will be provided by: Wound cleansing:              Do not scrub or use excessive force. Wash hands with soap and water before and after dressing changes. Prior to applying a clean dressing, cleanse wound with normal saline, wound cleanser, or mild soap and water. Ask the physician or nurse before getting the wound(s) wet in a shower     Daily Wound management:              Keep weight off wounds and reposition every 2 hours. Avoid standing for long periods of time. Apply wraps/stockings in AM and remove at bedtime. If swelling is present, elevate legs to the level of the heart or above for 30 minutes 4-5 times a day and/or when sitting. When taking antibiotics take entire prescription as ordered by physician do not stop taking until medicine is all gone. Orders for this week: 8/24/22                  [facility] [address] [place] [address] in [address] with soap and water, pat dry. Apply Gentamicin and Stimulen powder to wound bed. Cover with ca alginate and folded 4x4 gauze. Secure with 1\" coban. Change daily. Follow up with Joshua Amador CNP in 1 week in the wound care center. Call [phone] for any questions or concerns.   Date__________   Time____________

## 2022-08-31 ENCOUNTER — HOSPITAL ENCOUNTER (OUTPATIENT)
Dept: WOUND CARE | Age: 27
Discharge: HOME | End: 2022-08-31
Payer: COMMERCIAL

## 2022-08-31 VITALS
TEMPERATURE: 95.3 F | HEART RATE: 115 BPM | RESPIRATION RATE: 20 BRPM | DIASTOLIC BLOOD PRESSURE: 71 MMHG | SYSTOLIC BLOOD PRESSURE: 127 MMHG

## 2022-08-31 DIAGNOSIS — L97.512 CHRONIC ULCER OF GREAT TOE OF RIGHT FOOT WITH FAT LAYER EXPOSED (HCC): ICD-10-CM

## 2022-08-31 DIAGNOSIS — L97.519 DIABETIC ULCER OF RIGHT GREAT TOE (HCC): Primary | ICD-10-CM

## 2022-08-31 DIAGNOSIS — E11.621 DIABETIC ULCER OF RIGHT GREAT TOE (HCC): Primary | ICD-10-CM

## 2022-08-31 PROCEDURE — 11042 DBRDMT SUBQ TIS 1ST 20SQCM/<: CPT | Performed by: NURSE PRACTITIONER

## 2022-08-31 PROCEDURE — 6370000000 HC RX 637 (ALT 250 FOR IP): Performed by: NURSE PRACTITIONER

## 2022-08-31 PROCEDURE — 11042 DBRDMT SUBQ TIS 1ST 20SQCM/<: CPT

## 2022-08-31 RX ORDER — GENTAMICIN SULFATE 1 MG/G
OINTMENT TOPICAL ONCE
Status: CANCELLED | OUTPATIENT
Start: 2022-08-31 | End: 2022-08-31

## 2022-08-31 RX ORDER — LIDOCAINE HYDROCHLORIDE 40 MG/ML
SOLUTION TOPICAL ONCE
Status: CANCELLED | OUTPATIENT
Start: 2022-08-31 | End: 2022-08-31

## 2022-08-31 RX ORDER — LIDOCAINE HYDROCHLORIDE 20 MG/ML
JELLY TOPICAL ONCE
Status: CANCELLED | OUTPATIENT
Start: 2022-08-31 | End: 2022-08-31

## 2022-08-31 RX ORDER — CLOBETASOL PROPIONATE 0.5 MG/G
OINTMENT TOPICAL ONCE
Status: CANCELLED | OUTPATIENT
Start: 2022-08-31 | End: 2022-08-31

## 2022-08-31 RX ORDER — GENTAMICIN SULFATE 1 MG/G
OINTMENT TOPICAL ONCE
Status: COMPLETED | OUTPATIENT
Start: 2022-08-31 | End: 2022-08-31

## 2022-08-31 RX ORDER — LIDOCAINE 50 MG/G
OINTMENT TOPICAL ONCE
Status: CANCELLED | OUTPATIENT
Start: 2022-08-31 | End: 2022-08-31

## 2022-08-31 RX ORDER — BETAMETHASONE DIPROPIONATE 0.05 %
OINTMENT (GRAM) TOPICAL ONCE
Status: CANCELLED | OUTPATIENT
Start: 2022-08-31 | End: 2022-08-31

## 2022-08-31 RX ORDER — LIDOCAINE 40 MG/G
CREAM TOPICAL ONCE
Status: CANCELLED | OUTPATIENT
Start: 2022-08-31 | End: 2022-08-31

## 2022-08-31 RX ORDER — BACITRACIN ZINC AND POLYMYXIN B SULFATE 500; 1000 [USP'U]/G; [USP'U]/G
OINTMENT TOPICAL ONCE
Status: CANCELLED | OUTPATIENT
Start: 2022-08-31 | End: 2022-08-31

## 2022-08-31 RX ORDER — GINSENG 100 MG
CAPSULE ORAL ONCE
Status: CANCELLED | OUTPATIENT
Start: 2022-08-31 | End: 2022-08-31

## 2022-08-31 RX ORDER — BACITRACIN, NEOMYCIN, POLYMYXIN B 400; 3.5; 5 [USP'U]/G; MG/G; [USP'U]/G
OINTMENT TOPICAL ONCE
Status: CANCELLED | OUTPATIENT
Start: 2022-08-31 | End: 2022-08-31

## 2022-08-31 RX ADMIN — GENTAMICIN SULFATE: 1 OINTMENT TOPICAL at 13:59

## 2022-08-31 ASSESSMENT — PAIN SCALES - GENERAL: PAINLEVEL_OUTOF10: 0

## 2022-08-31 NOTE — DISCHARGE INSTRUCTIONS
PHYSICIAN ORDERS AND DISCHARGE INSTRUCTIONS     NOTE: Upon discharge from the [address], you will receive a patient experience survey. We would be grateful if you would take the time to fill this survey out. Wound care order history:                 MAX's   Right       Left               Date:              Cultures:                Grafts:                Antibiotics:           Continuing wound care orders and information:                 Residence:                Continue home health care with:              Your wound-care supplies will be provided by: Wound cleansing:              Do not scrub or use excessive force. Wash hands with soap and water before and after dressing changes. Prior to applying a clean dressing, cleanse wound with normal saline, wound cleanser, or mild soap and water. Ask the physician or nurse before getting the wound(s) wet in a shower     Daily Wound management:              Keep weight off wounds and reposition every 2 hours. Avoid standing for long periods of time. Apply wraps/stockings in AM and remove at bedtime. If swelling is present, elevate legs to the level of the heart or above for 30 minutes 4-5 times a day and/or when sitting. When taking antibiotics take entire prescription as ordered by physician do not stop taking until medicine is all gone. Orders for this week: 8/31/22    [facility] [address] [place] [address] in [address] with soap and water, pat dry. Apply Gentamicin and Stimulen powder to wound bed. Cover with ca alginate and folded 4x4 gauze. Secure with 1\" coban. Change daily. Follow up with Shanda Knox CNP in 1 week in the wound care center. Call [phone] for any questions or concerns.   Date__________   Time____________

## 2022-08-31 NOTE — PROGRESS NOTES
Wound Care Center Progress Note With Procedure    Leelee Jama  AGE: 32 y.o. GENDER: female  : 1995  EPISODE DATE:  2022     Subjective:     Chief Complaint   Patient presents with    Wound Check     Right great toe         HISTORY of PRESENT ILLNESS     Leelee Jama is a 32 y.o. female who presents to the [address] [place] [address] for a visit for evaluation and treatment of Chronic diabetic  ulcer(s) of  Rt. foot hallux. The condition is of moderate severity. The ulcer has been present since 2022. The underlying cause is thought to be diabetes. The patients care to date has included regular wound care in [place] . The patient has significant underlying medical conditions as below. Measuring slightly larger this week, but this is because patient peeled away a callus this week. Wound is not actually larger, and wound itself is a sliver and healing very well. Some redness and excoriation from callus removal but this is very shallow and not an open wound  Very close to healing and patient denies any issues      Foot care: advised to wash feet daily, pat dry and apply lotion at night, avoiding between toes. Need to look at feet daily and report to a physician any signs of inflammation or skin damage. Discussed diabetes shoes and socks. Different diabetic medications. Managing high and low sugar readings. Patient educated on the 6 essential components necessary for wound healing: Circulation, Debridements, Proper Dressings and Topical Wound Products, Infection Control, Edema Control and Offloading. Patient educated on those factors that negatively effect or impact wound healing: smoking, obesity, uncontrolled diabetes, anticoagulant and immunosuppressive regimens, inadequate nutrition, untreated arterial and venous disease if applicable and measures to manage edema.        Wound Pain Timing/Severity: none  Quality of pain: N/A  Severity of pain:  0 / 10  Modifying Factors: diabetes  Associated Signs/Symptoms: erythema, drainage, and numbness        PAST MEDICAL HISTORY        Diagnosis Date    Diabetes mellitus ([address]        PAST SURGICAL HISTORY    History reviewed. No pertinent surgical history. FAMILY HISTORY    Family History   Problem Relation Age of Onset    Diabetes Father     Heart Disease Father     Depression Maternal Grandmother     Depression Maternal Grandfather     Depression Paternal Grandmother     Depression Paternal Grandfather        SOCIAL HISTORY    Social History     Tobacco Use    Smoking status: Former     Types: Cigarettes     Quit date: 2013     Years since quittin.7    Smokeless tobacco: Never    Tobacco comments:     EDUCATED DM II SLOWS WOUND HEALING   Substance Use Topics    Alcohol use: No    Drug use: No       ALLERGIES    No Known Allergies    MEDICATIONS    Current Outpatient Medications on File Prior to Encounter   Medication Sig Dispense Refill    naproxen (NAPROSYN) 500 MG tablet Take 1 tablet by mouth 2 times daily as needed for Pain 20 tablet 0    acetaminophen (APAP EXTRA STRENGTH) 500 MG tablet Take 1 tablet by mouth every 6 hours as needed for Pain 120 tablet 3     No current facility-administered medications on file prior to encounter. REVIEW OF SYSTEMS    Pertinent items are noted in HPI. Constitutional: Negative for systemic symptoms including fever, chills and malaise. Objective:      /71   Pulse (!) 115   Temp (!) 95.3 °F (35.2 °C) (Temporal)   Resp 20     PHYSICAL EXAM      General: The patient is in no acute distress. Mental status:  Patient is appropriate, is  oriented to place and plan of care.   Dermatologic exam: Visual inspection of the periwound reveals the skin to be normal in turgor and texture, dry, and coarse  Wound exam: see wound description below in procedure note      Assessment:     Problem List Items Addressed This Visit          Endocrine    WD-Diabetic ulcer of right great toe ([address] - Primary    Relevant Medications    gentamicin (GARAMYCIN) 0.1 % ointment (Start on 8/31/2022  2:00 PM)    Other Relevant Orders    Initiate Outpatient Wound Care Protocol       Other    WD-Chronic ulcer of great toe of right foot with fat layer exposed ([address]    Relevant Medications    gentamicin (GARAMYCIN) 0.1 % ointment (Start on 8/31/2022  2:00 PM)    Other Relevant Orders    Initiate Outpatient Wound Care Protocol     Procedure Note    Indications:  Based on my examination of this patient's wound(s) today, sharp excision into necrotic subcutaneous tissue is required to promote healing and evaluate the extent of previous healing. Performed by: CARMEN Pro CNP    Consent obtained: Yes    Time out taken:  Yes    Pain Control: N/A      Debridement:Excisional Debridement    Using #15 blade scalpel the wound(s) was/were sharply debrided down through and including the removal of subcutaneous tissue.         Devitalized Tissue Debrided:  fibrin, biofilm, slough, and callus    Pre Debridement Measurements:  Are located in the Wound Documentation Flow Sheet    All active wounds listed below with today's date are evaluated  Wound(s)    debrided this date include # : 1     Post  Debridement Measurements:  Wound 07/20/22 Toe (Comment  which one) Anterior;Right #1 GR (Active)   Wound Image   08/17/22 1322   Wound Etiology Diabetic 08/31/22 1331   Dressing Status New dressing applied 08/17/22 1354   Wound Cleansed Wound cleanser 08/31/22 1331   Offloading for Diabetic Foot Ulcers Offloading ordered 08/31/22 1331   Wound Length (cm) 2.2 cm 08/31/22 1331   Wound Width (cm) 1.5 cm 08/31/22 1331   Wound Depth (cm) 0.1 cm 08/31/22 1331   Wound Surface Area (cm^2) 3.3 cm^2 08/31/22 1331   Change in Wound Size % (l*w) -230 08/31/22 1331   Wound Volume (cm^3) 0.33 cm^3 08/31/22 1331   Wound Healing % -230 08/31/22 1331   Post-Procedure Length (cm) 2.2 cm 08/31/22 1339   Post-Procedure Width (cm) 1.5 cm 08/31/22 1339 water before and after dressing changes. Prior to applying a clean dressing, cleanse wound with normal saline, wound cleanser, or mild soap and water. Ask the physician or nurse before getting the wound(s) wet in a shower     Daily Wound management:              Keep weight off wounds and reposition every 2 hours. Avoid standing for long periods of time. Apply wraps/stockings in AM and remove at bedtime. If swelling is present, elevate legs to the level of the heart or above for 30 minutes 4-5 times a day and/or when sitting. When taking antibiotics take entire prescription as ordered by physician do not stop taking until medicine is all gone. Orders for this week: 8/31/22    [facility] [address] with soap and water, pat dry. Apply Gentamicin and Stimulen powder to wound bed. Cover with ca alginate and folded 4x4 gauze. Secure with 1\" coban. Change daily. Follow up with Alfredo Murry CNP in 1 week in the wound care center. Call [phone] for any questions or concerns.   Date__________   Time____________        Treatment Note      Written Patient Dismissal Instructions Given            Electronically signed by Agatha Fothergill, APRN - CNP on 8/31/2022 at 1:51 PM

## 2022-09-07 ENCOUNTER — HOSPITAL ENCOUNTER (OUTPATIENT)
Dept: WOUND CARE | Age: 27
Discharge: HOME | End: 2022-09-07
Payer: COMMERCIAL

## 2022-09-07 VITALS
SYSTOLIC BLOOD PRESSURE: 103 MMHG | RESPIRATION RATE: 18 BRPM | TEMPERATURE: 96.2 F | HEART RATE: 111 BPM | DIASTOLIC BLOOD PRESSURE: 72 MMHG

## 2022-09-07 DIAGNOSIS — E11.621 DIABETIC ULCER OF RIGHT GREAT TOE (HCC): Primary | ICD-10-CM

## 2022-09-07 DIAGNOSIS — L97.512 CHRONIC ULCER OF GREAT TOE OF RIGHT FOOT WITH FAT LAYER EXPOSED (HCC): ICD-10-CM

## 2022-09-07 DIAGNOSIS — L97.519 DIABETIC ULCER OF RIGHT GREAT TOE (HCC): Primary | ICD-10-CM

## 2022-09-07 PROCEDURE — 11042 DBRDMT SUBQ TIS 1ST 20SQCM/<: CPT | Performed by: NURSE PRACTITIONER

## 2022-09-07 PROCEDURE — 11042 DBRDMT SUBQ TIS 1ST 20SQCM/<: CPT

## 2022-09-07 RX ORDER — LIDOCAINE 40 MG/G
CREAM TOPICAL ONCE
Status: CANCELLED | OUTPATIENT
Start: 2022-09-07 | End: 2022-09-07

## 2022-09-07 RX ORDER — CLOBETASOL PROPIONATE 0.5 MG/G
OINTMENT TOPICAL ONCE
Status: CANCELLED | OUTPATIENT
Start: 2022-09-07 | End: 2022-09-07

## 2022-09-07 RX ORDER — GINSENG 100 MG
CAPSULE ORAL ONCE
Status: CANCELLED | OUTPATIENT
Start: 2022-09-07 | End: 2022-09-07

## 2022-09-07 RX ORDER — LIDOCAINE 50 MG/G
OINTMENT TOPICAL ONCE
Status: CANCELLED | OUTPATIENT
Start: 2022-09-07 | End: 2022-09-07

## 2022-09-07 RX ORDER — BETAMETHASONE DIPROPIONATE 0.05 %
OINTMENT (GRAM) TOPICAL ONCE
Status: CANCELLED | OUTPATIENT
Start: 2022-09-07 | End: 2022-09-07

## 2022-09-07 RX ORDER — GENTAMICIN SULFATE 1 MG/G
OINTMENT TOPICAL ONCE
Status: CANCELLED | OUTPATIENT
Start: 2022-09-07 | End: 2022-09-07

## 2022-09-07 RX ORDER — LIDOCAINE HYDROCHLORIDE 40 MG/ML
SOLUTION TOPICAL ONCE
Status: CANCELLED | OUTPATIENT
Start: 2022-09-07 | End: 2022-09-07

## 2022-09-07 RX ORDER — LIDOCAINE HYDROCHLORIDE 20 MG/ML
JELLY TOPICAL ONCE
Status: CANCELLED | OUTPATIENT
Start: 2022-09-07 | End: 2022-09-07

## 2022-09-07 RX ORDER — BACITRACIN ZINC AND POLYMYXIN B SULFATE 500; 1000 [USP'U]/G; [USP'U]/G
OINTMENT TOPICAL ONCE
Status: CANCELLED | OUTPATIENT
Start: 2022-09-07 | End: 2022-09-07

## 2022-09-07 RX ORDER — BACITRACIN, NEOMYCIN, POLYMYXIN B 400; 3.5; 5 [USP'U]/G; MG/G; [USP'U]/G
OINTMENT TOPICAL ONCE
Status: CANCELLED | OUTPATIENT
Start: 2022-09-07 | End: 2022-09-07

## 2022-09-07 ASSESSMENT — PAIN SCALES - GENERAL: PAINLEVEL_OUTOF10: 0

## 2022-09-07 NOTE — PROGRESS NOTES
Wound Care Center Progress Note With Procedure    James Neff  AGE: 32 y.o. GENDER: female  : 1995  EPISODE DATE:  2022     Subjective:     Chief Complaint   Patient presents with    Wound Check     Right great toe          HISTORY of PRESENT ILLNESS     James Neff is a 32 y.o. female who presents to the [address] [place] [address] for a visit for evaluation and treatment of Chronic diabetic  ulcer(s) of  Rt. foot hallux. The condition is of moderate severity. The ulcer has been present since 2022. The underlying cause is thought to be diabetes. The patients care to date has included regular wound care in [place] . The patient has significant underlying medical conditions as below. Macerated this week and did not close as expected. Wound about the same size, but too moist to close  We will change around some dressing orders this week. Patient vacation at the beginning of next month. Foot care: advised to wash feet daily, pat dry and apply lotion at night, avoiding between toes. Need to look at feet daily and report to a physician any signs of inflammation or skin damage. Discussed diabetes shoes and socks. Different diabetic medications. Managing high and low sugar readings. Patient educated on the 6 essential components necessary for wound healing: Circulation, Debridements, Proper Dressings and Topical Wound Products, Infection Control, Edema Control and Offloading. Patient educated on those factors that negatively effect or impact wound healing: smoking, obesity, uncontrolled diabetes, anticoagulant and immunosuppressive regimens, inadequate nutrition, untreated arterial and venous disease if applicable and measures to manage edema.        Wound Pain Timing/Severity: none  Quality of pain: N/A  Severity of pain:  0 / 10  Modifying Factors: diabetes  Associated Signs/Symptoms: erythema, drainage, and numbness        PAST MEDICAL HISTORY        Diagnosis Date exposed [facility])    Relevant Orders    Initiate Outpatient Wound Care Protocol     Procedure Note    Indications:  Based on my examination of this patient's wound(s) today, sharp excision into necrotic subcutaneous tissue is required to promote healing and evaluate the extent of previous healing. Performed by: CARMEN Boo CNP    Consent obtained: Yes    Time out taken:  Yes    Pain Control: N/A      Debridement:Excisional Debridement    Using #15 blade scalpel the wound(s) was/were sharply debrided down through and including the removal of subcutaneous tissue.         Devitalized Tissue Debrided:  fibrin, biofilm, slough, exudate, and callus    Pre Debridement Measurements:  Are located in the Wound Documentation Flow Sheet    All active wounds listed below with today's date are evaluated  Wound(s)    debrided this date include # : 1     Post  Debridement Measurements:  Wound 07/20/22 Toe (Comment  which one) Anterior;Right #1 GR (Active)   Wound Image   08/17/22 1322   Wound Etiology Diabetic 09/07/22 1353   Dressing Status New dressing applied 08/31/22 1358   Wound Cleansed Wound cleanser 09/07/22 1353   Offloading for Diabetic Foot Ulcers Offloading ordered 09/07/22 1353   Wound Length (cm) 1 cm 09/07/22 1353   Wound Width (cm) 0.5 cm 09/07/22 1353   Wound Depth (cm) 0.1 cm 09/07/22 1353   Wound Surface Area (cm^2) 0.5 cm^2 09/07/22 1353   Change in Wound Size % (l*w) 50 09/07/22 1353   Wound Volume (cm^3) 0.05 cm^3 09/07/22 1353   Wound Healing % 50 09/07/22 1353   Post-Procedure Length (cm) 1 cm 09/07/22 1427   Post-Procedure Width (cm) 0.5 cm 09/07/22 1427   Post-Procedure Depth (cm) 0.1 cm 09/07/22 1427   Post-Procedure Surface Area (cm^2) 0.5 cm^2 09/07/22 1427   Post-Procedure Volume (cm^3) 0.05 cm^3 09/07/22 1427   Distance Tunneling (cm) 0 cm 09/07/22 1353   Tunneling Position ___ O'Clock 0 09/07/22 1353   Undermining Starts ___ O'Clock 0 09/07/22 1353   Undermining Ends___ O'Clock 0 09/07/22 [address] (cm) 0 09/07/22 1353   Wound Assessment Pink/red 09/07/22 1353   Drainage Amount Small 09/07/22 1353   Drainage Description Serosanguinous 09/07/22 1353   Odor None 09/07/22 1353   Annika-wound Assessment Blanchable erythema 09/07/22 1353   Margins Defined edges 09/07/22 1353   Wound Thickness Description not for Pressure Injury Full thickness 09/07/22 1353   Number of days: 49       Percent of Wound(s) Debrided: approximately 100%    Total  Area  Debrided:  0.05 sq cm     Bleeding:  Minimal    Hemostasis Achieved:  by pressure    Procedural Pain:  0  / 10     Post Procedural Pain:  0 / 10     Response to treatment:  Well tolerated by patient. Status of wound progress and description from last visit:   Not much improvement this week. We will focus on drying and remove gent and add ioplex this week  Patient educated on changes. Follow up 1 week and continue to monitor       Plan:       Discharge Instructions         [address]     NOTE: Upon discharge from the [address], you will receive a patient experience survey. We would be grateful if you would take the time to fill this survey out. Wound care order history:                 MAX's   Right       Left               Date:              Cultures:                Grafts:                Antibiotics:           Continuing wound care orders and information:                 Residence:                Continue home health care with:              Your wound-care supplies will be provided by: Wound cleansing:              Do not scrub or use excessive force. Wash hands with soap and water before and after dressing changes. Prior to applying a clean dressing, cleanse wound with normal saline, wound cleanser, or mild soap and water.               Ask the physician or nurse before getting the wound(s) wet in a shower     Daily Wound management:              Keep weight off wounds and reposition every 2 hours. Avoid standing for long periods of time. Apply wraps/stockings in AM and remove at bedtime. If swelling is present, elevate legs to the level of the heart or above for 30 minutes 4-5 times a day and/or when sitting. When taking antibiotics take entire prescription as ordered by physician do not stop taking until medicine is all gone. Orders for this week: 9/7/22     [facility] in [address] with soap and water, pat dry. Apply Stimulen powder to wound bed. Cover with Ioplex and ca alginate. Secure with 1\" coban. Change daily. Follow up with Мария Garzon CNP in 1 week in the wound care center. Call [phone] for any questions or concerns.   Date__________   Time____________        Treatment Note      Written Patient Dismissal Instructions Given            Electronically signed by Madelene Cogan, APRN - CNP on 9/7/2022 at 2:41 PM

## 2022-09-07 NOTE — DISCHARGE INSTRUCTIONS
PHYSICIAN ORDERS AND DISCHARGE INSTRUCTIONS     NOTE: Upon discharge from the [address], you will receive a patient experience survey. We would be grateful if you would take the time to fill this survey out. Wound care order history:                 MAX's   Right       Left               Date:              Cultures:                Grafts:                Antibiotics:           Continuing wound care orders and information:                 Residence:                Continue home health care with:              Your wound-care supplies will be provided by: Wound cleansing:              Do not scrub or use excessive force. Wash hands with soap and water before and after dressing changes. Prior to applying a clean dressing, cleanse wound with normal saline, wound cleanser, or mild soap and water. Ask the physician or nurse before getting the wound(s) wet in a shower     Daily Wound management:              Keep weight off wounds and reposition every 2 hours. Avoid standing for long periods of time. Apply wraps/stockings in AM and remove at bedtime. If swelling is present, elevate legs to the level of the heart or above for 30 minutes 4-5 times a day and/or when sitting. When taking antibiotics take entire prescription as ordered by physician do not stop taking until medicine is all gone. Orders for this week: 9/7/22     [facility] in [address] with soap and water, pat dry. Apply Stimulen powder to wound bed. Cover with Ioplex and ca alginate. Secure with 1\" coban. Change daily. Follow up with Steph Roberson CNP in 1 week in the wound care center. Call [phone] for any questions or concerns.   Date__________   Time____________

## 2022-09-14 ENCOUNTER — HOSPITAL ENCOUNTER (OUTPATIENT)
Dept: WOUND CARE | Age: 27
Discharge: HOME | End: 2022-09-14
Payer: COMMERCIAL

## 2022-09-14 VITALS
SYSTOLIC BLOOD PRESSURE: 145 MMHG | TEMPERATURE: 97 F | RESPIRATION RATE: 18 BRPM | HEART RATE: 104 BPM | DIASTOLIC BLOOD PRESSURE: 86 MMHG

## 2022-09-14 DIAGNOSIS — L97.512 CHRONIC ULCER OF GREAT TOE OF RIGHT FOOT WITH FAT LAYER EXPOSED (HCC): ICD-10-CM

## 2022-09-14 DIAGNOSIS — E11.621 DIABETIC ULCER OF RIGHT GREAT TOE (HCC): Primary | ICD-10-CM

## 2022-09-14 DIAGNOSIS — L97.519 DIABETIC ULCER OF RIGHT GREAT TOE (HCC): Primary | ICD-10-CM

## 2022-09-14 PROCEDURE — 11042 DBRDMT SUBQ TIS 1ST 20SQCM/<: CPT

## 2022-09-14 PROCEDURE — 11042 DBRDMT SUBQ TIS 1ST 20SQCM/<: CPT | Performed by: NURSE PRACTITIONER

## 2022-09-14 RX ORDER — LIDOCAINE HYDROCHLORIDE 20 MG/ML
JELLY TOPICAL ONCE
Status: CANCELLED | OUTPATIENT
Start: 2022-09-14 | End: 2022-09-14

## 2022-09-14 RX ORDER — BACITRACIN, NEOMYCIN, POLYMYXIN B 400; 3.5; 5 [USP'U]/G; MG/G; [USP'U]/G
OINTMENT TOPICAL ONCE
Status: CANCELLED | OUTPATIENT
Start: 2022-09-14 | End: 2022-09-14

## 2022-09-14 RX ORDER — EMPAGLIFLOZIN, METFORMIN HYDROCHLORIDE 12.5; 1 MG/1; MG/1
1 TABLET, EXTENDED RELEASE ORAL 2 TIMES DAILY
COMMUNITY

## 2022-09-14 RX ORDER — METOCLOPRAMIDE 10 MG/1
10 TABLET ORAL 3 TIMES DAILY
COMMUNITY

## 2022-09-14 RX ORDER — ZINC GLUCONATE 50 MG
50 TABLET ORAL DAILY
COMMUNITY

## 2022-09-14 RX ORDER — LIDOCAINE 40 MG/G
CREAM TOPICAL ONCE
Status: CANCELLED | OUTPATIENT
Start: 2022-09-14 | End: 2022-09-14

## 2022-09-14 RX ORDER — ATORVASTATIN CALCIUM 10 MG/1
10 TABLET, FILM COATED ORAL DAILY
COMMUNITY

## 2022-09-14 RX ORDER — LIDOCAINE 50 MG/G
OINTMENT TOPICAL ONCE
Status: CANCELLED | OUTPATIENT
Start: 2022-09-14 | End: 2022-09-14

## 2022-09-14 RX ORDER — GINSENG 100 MG
CAPSULE ORAL ONCE
Status: CANCELLED | OUTPATIENT
Start: 2022-09-14 | End: 2022-09-14

## 2022-09-14 RX ORDER — LIDOCAINE HYDROCHLORIDE 40 MG/ML
SOLUTION TOPICAL ONCE
Status: CANCELLED | OUTPATIENT
Start: 2022-09-14 | End: 2022-09-14

## 2022-09-14 RX ORDER — BUSPIRONE HYDROCHLORIDE 5 MG/1
5 TABLET ORAL 3 TIMES DAILY PRN
COMMUNITY

## 2022-09-14 RX ORDER — DULAGLUTIDE 4.5 MG/.5ML
4.5 INJECTION, SOLUTION SUBCUTANEOUS WEEKLY
COMMUNITY

## 2022-09-14 RX ORDER — GENTAMICIN SULFATE 1 MG/G
OINTMENT TOPICAL ONCE
Status: CANCELLED | OUTPATIENT
Start: 2022-09-14 | End: 2022-09-14

## 2022-09-14 RX ORDER — ACETAMINOPHEN 160 MG
1 TABLET,DISINTEGRATING ORAL DAILY
COMMUNITY

## 2022-09-14 RX ORDER — BACITRACIN ZINC AND POLYMYXIN B SULFATE 500; 1000 [USP'U]/G; [USP'U]/G
OINTMENT TOPICAL ONCE
Status: CANCELLED | OUTPATIENT
Start: 2022-09-14 | End: 2022-09-14

## 2022-09-14 RX ORDER — BUDESONIDE AND FORMOTEROL FUMARATE DIHYDRATE 80; 4.5 UG/1; UG/1
2 AEROSOL RESPIRATORY (INHALATION) 2 TIMES DAILY
COMMUNITY

## 2022-09-14 RX ORDER — CLOBETASOL PROPIONATE 0.5 MG/G
OINTMENT TOPICAL ONCE
Status: CANCELLED | OUTPATIENT
Start: 2022-09-14 | End: 2022-09-14

## 2022-09-14 RX ORDER — BETAMETHASONE DIPROPIONATE 0.05 %
OINTMENT (GRAM) TOPICAL ONCE
Status: CANCELLED | OUTPATIENT
Start: 2022-09-14 | End: 2022-09-14

## 2022-09-14 RX ORDER — LISINOPRIL 5 MG/1
5 TABLET ORAL DAILY
COMMUNITY

## 2022-09-14 RX ORDER — ASCORBIC ACID 500 MG
1000 TABLET ORAL DAILY
COMMUNITY

## 2022-09-14 ASSESSMENT — PAIN DESCRIPTION - ORIENTATION: ORIENTATION: RIGHT

## 2022-09-14 ASSESSMENT — PAIN DESCRIPTION - FREQUENCY: FREQUENCY: INTERMITTENT

## 2022-09-14 ASSESSMENT — PAIN DESCRIPTION - PAIN TYPE: TYPE: ACUTE PAIN

## 2022-09-14 ASSESSMENT — PAIN DESCRIPTION - ONSET: ONSET: ON-GOING

## 2022-09-14 ASSESSMENT — PAIN - FUNCTIONAL ASSESSMENT: PAIN_FUNCTIONAL_ASSESSMENT: PREVENTS OR INTERFERES SOME ACTIVE ACTIVITIES AND ADLS

## 2022-09-14 ASSESSMENT — PAIN DESCRIPTION - LOCATION: LOCATION: TOE (COMMENT WHICH ONE)

## 2022-09-14 ASSESSMENT — PAIN SCALES - GENERAL: PAINLEVEL_OUTOF10: 4

## 2022-09-14 ASSESSMENT — PAIN DESCRIPTION - DESCRIPTORS: DESCRIPTORS: SHARP;SHOOTING

## 2022-09-14 NOTE — DISCHARGE INSTRUCTIONS
PHYSICIAN ORDERS AND DISCHARGE INSTRUCTIONS     NOTE: Upon discharge from the [address], you will receive a patient experience survey. We would be grateful if you would take the time to fill this survey out. Wound care order history:                 MAX's   Right       Left               Date:              Cultures:                Grafts:                Antibiotics:           Continuing wound care orders and information:                 Residence:                Continue home health care with:              Your wound-care supplies will be provided by: Wound cleansing:              Do not scrub or use excessive force. Wash hands with soap and water before and after dressing changes. Prior to applying a clean dressing, cleanse wound with normal saline, wound cleanser, or mild soap and water. Ask the physician or nurse before getting the wound(s) wet in a shower     Daily Wound management:              Keep weight off wounds and reposition every 2 hours. Avoid standing for long periods of time. Apply wraps/stockings in AM and remove at bedtime. If swelling is present, elevate legs to the level of the heart or above for 30 minutes 4-5 times a day and/or when sitting. When taking antibiotics take entire prescription as ordered by physician do not stop taking until medicine is all gone. Orders for this week: 9/14/22     [facility] in [address] with soap and water, pat dry. Apply Stimulen powder to wound bed. Cover with Ioplex and ca alginate. Secure with 1\" coban. Change daily. Follow up with Torie Burns CNP in 1 week in the wound care center. Call [phone] for any questions or concerns.   Date__________   Time____________

## 2022-09-14 NOTE — PROGRESS NOTES
Wound Care Center Progress Note With Procedure    Luz Marina Rosa  AGE: 32 y.o. GENDER: female  : 1995  EPISODE DATE:  2022     Subjective:     Chief Complaint   Patient presents with    Wound Check     Right great toe         HISTORY of PRESENT ILLNESS     Luz aMrina Rosa is a 32 y.o. female who presents to the [address] [place] [address] for a visit for evaluation and treatment of Chronic diabetic  ulcer(s) of  Rt. foot hallux. The condition is of moderate severity. The ulcer has been present since 2022. The underlying cause is thought to be diabetes. The patients care to date has included regular wound care in [place] . The patient has significant underlying medical conditions as below. Improved since last week. Not as macerated. Patient reports that she has not been using the ioplex. Need to emphasize this to patient to keep dry, as she has some excess moisture     Foot care: advised to wash feet daily, pat dry and apply lotion at night, avoiding between toes. Need to look at feet daily and report to a physician any signs of inflammation or skin damage. Discussed diabetes shoes and socks. Different diabetic medications. Managing high and low sugar readings. Patient educated on the 6 essential components necessary for wound healing: Circulation, Debridements, Proper Dressings and Topical Wound Products, Infection Control, Edema Control and Offloading. Patient educated on those factors that negatively effect or impact wound healing: smoking, obesity, uncontrolled diabetes, anticoagulant and immunosuppressive regimens, inadequate nutrition, untreated arterial and venous disease if applicable and measures to manage edema.        Wound Pain Timing/Severity: none  Quality of pain: N/A  Severity of pain:  0 / 10  Modifying Factors: diabetes  Associated Signs/Symptoms: erythema, drainage, and numbness        PAST MEDICAL HISTORY        Diagnosis Date    Diabetes mellitus ([address] PAST SURGICAL HISTORY    History reviewed. No pertinent surgical history. FAMILY HISTORY    Family History   Problem Relation Age of Onset    Diabetes Father     Heart Disease Father     Depression Maternal Grandmother     Depression Maternal Grandfather     Depression Paternal Grandmother     Depression Paternal Grandfather        SOCIAL HISTORY    Social History     Tobacco Use    Smoking status: Former     Types: Cigarettes     Quit date: 2013     Years since quittin.7    Smokeless tobacco: Never    Tobacco comments:     EDUCATED DM II SLOWS WOUND HEALING   Substance Use Topics    Alcohol use: No    Drug use: No       ALLERGIES    No Known Allergies    MEDICATIONS    Current Outpatient Medications on File Prior to Encounter   Medication Sig Dispense Refill    busPIRone (BUSPAR) 5 MG tablet Take 5 mg by mouth 3 times daily as needed      lisinopril (PRINIVIL;ZESTRIL) 5 MG tablet Take 5 mg by mouth daily      metoclopramide (REGLAN) 10 MG tablet Take 10 mg by mouth 3 times daily      Dulaglutide (TRULICITY) 4.5 ZD/5.7ZK SOPN Inject 4.5 mg into the skin once a week      Cholecalciferol (VITAMIN D3) 50 MCG (2000 UT) CAPS Take 1 capsule by mouth daily      budesonide-formoterol (SYMBICORT) 80-4.5 MCG/ACT AERO Inhale 2 puffs into the lungs 2 times daily      atorvastatin (LIPITOR) 10 MG tablet Take 10 mg by mouth daily      Norgestim-Eth Estrad Triphasic (TRI-ESTARYLLA PO) Take 1 tablet by mouth daily      Empagliflozin-metFORMIN HCl ER (SYNJARDY XR) 12.5-1000 MG TB24 Take 1 tablet by mouth 2 times daily      vitamin C (ASCORBIC ACID) 500 MG tablet Take 1,000 mg by mouth daily      zinc gluconate 50 MG tablet Take 50 mg by mouth daily      naproxen (NAPROSYN) 500 MG tablet Take 1 tablet by mouth 2 times daily as needed for Pain 20 tablet 0     No current facility-administered medications on file prior to encounter. REVIEW OF SYSTEMS    Pertinent items are noted in HPI.     Constitutional: Negative for systemic symptoms including fever, chills and malaise. Objective:      BP (!) 145/86   Pulse (!) 104   Temp 97 °F (36.1 °C) (Temporal)   Resp 18     PHYSICAL EXAM      General: The patient is in no acute distress. Mental status:  Patient is appropriate, is  oriented to place and plan of care. Dermatologic exam: Visual inspection of the periwound reveals the skin to be moist, scaly, and edematous  Wound exam: see wound description below in procedure note      Assessment:     Problem List Items Addressed This Visit          Endocrine    WD-Diabetic ulcer of right great toe ([address] - Primary    Relevant Medications    Dulaglutide (TRULICITY) 4.5 SS/7.3XM SOPN    Empagliflozin-metFORMIN HCl ER (SYNJARDY XR) 12.5-1000 MG TB24    Other Relevant Orders    Initiate Outpatient Wound Care Protocol       Other    WD-Chronic ulcer of great toe of right foot with fat layer exposed ([address]    Relevant Orders    Initiate Outpatient Wound Care Protocol     Procedure Note    Indications:  Based on my examination of this patient's wound(s) today, sharp excision into necrotic subcutaneous tissue is required to promote healing and evaluate the extent of previous healing. Performed by: CARMEN Robison - CNP    Consent obtained: Yes    Time out taken:  Yes    Pain Control: N/A      Debridement:Excisional Debridement    Using scissors and forceps the wound(s) was/were sharply debrided down through and including the removal of subcutaneous tissue.         Devitalized Tissue Debrided:  fibrin, biofilm, slough, and exudate    Pre Debridement Measurements:  Are located in the Wound Documentation Flow Sheet    All active wounds listed below with today's date are evaluated  Wound(s)    debrided this date include # : 1     Post  Debridement Measurements:  Wound 07/20/22 Toe (Comment  which one) Anterior;Right #1 GR (Active)   Wound Image   08/17/22 1322   Wound Etiology Diabetic 09/14/22 1340   Dressing Status New dressing applied;Clean;Dry; Intact 09/14/22 1431   Wound Cleansed Wound cleanser 09/14/22 1340   Offloading for Diabetic Foot Ulcers Offloading ordered 09/14/22 1340   Wound Length (cm) 0.7 cm 09/14/22 1340   Wound Width (cm) 0.7 cm 09/14/22 1340   Wound Depth (cm) 0.1 cm 09/14/22 1340   Wound Surface Area (cm^2) 0.49 cm^2 09/14/22 1340   Change in Wound Size % (l*w) 51 09/14/22 1340   Wound Volume (cm^3) 0.049 cm^3 09/14/22 1340   Wound Healing % 51 09/14/22 1340   Post-Procedure Length (cm) 0.7 cm 09/14/22 1348   Post-Procedure Width (cm) 0.7 cm 09/14/22 1348   Post-Procedure Depth (cm) 0.1 cm 09/14/22 1348   Post-Procedure Surface Area (cm^2) 0.49 cm^2 09/14/22 1348   Post-Procedure Volume (cm^3) 0.049 cm^3 09/14/22 1348   Distance Tunneling (cm) 0 cm 09/14/22 1340   Tunneling Position ___ O'Clock 0 09/14/22 1340   Undermining Starts ___ O'Clock 0 09/14/22 1340   Undermining Ends___ O'Clock 0 09/14/22 1340   Undermining Maxium Distance (cm) 0 09/14/22 1340   Wound Assessment Pink/red 09/14/22 1340   Drainage Amount Small 09/14/22 1340   Drainage Description Serosanguinous 09/14/22 1340   Odor None 09/14/22 1340   Annika-wound Assessment Hyperkeratosis (callous) 09/14/22 1340   Margins Defined edges 09/14/22 1340   Wound Thickness Description not for Pressure Injury Full thickness 09/14/22 1340   Number of days: 56       Percent of Wound(s) Debrided: approximately 100%    Total  Area  Debrided:  0.49 sq cm     Bleeding:  Minimal    Hemostasis Achieved:  by pressure    Procedural Pain:  0  / 10     Post Procedural Pain:  0 / 10     Response to treatment:  Well tolerated by patient. Status of wound progress and description from last visit:   Stable. Added silver nitrate stick to wound to dry this year       Plan:       Discharge Instructions         PHYSICIAN ORDERS AND DISCHARGE INSTRUCTIONS     NOTE: Upon discharge from the [address], you will receive a patient experience survey.  We would be grateful if you would take the time to fill this survey out. Wound care order history:                 MAX's   Right       Left               Date:              Cultures:                Grafts:                Antibiotics:           Continuing wound care orders and information:                 Residence:                Continue home health care with:              Your wound-care supplies will be provided by: Wound cleansing:              Do not scrub or use excessive force. Wash hands with soap and water before and after dressing changes. Prior to applying a clean dressing, cleanse wound with normal saline, wound cleanser, or mild soap and water. Ask the physician or nurse before getting the wound(s) wet in a shower     Daily Wound management:              Keep weight off wounds and reposition every 2 hours. Avoid standing for long periods of time. Apply wraps/stockings in AM and remove at bedtime. If swelling is present, elevate legs to the level of the heart or above for 30 minutes 4-5 times a day and/or when sitting. When taking antibiotics take entire prescription as ordered by physician do not stop taking until medicine is all gone. Orders for this week: 9/14/22     [facility] [address] [place] [address] in [address] with soap and water, pat dry. Apply Stimulen powder to wound bed. Cover with Ioplex and ca alginate. Secure with 1\" coban. Change daily. Follow up with Steph Roberson CNP in 1 week in the wound care center. Call [phone] for any questions or concerns.   Date__________   Time____________        Treatment Note Wound 07/20/22 Toe (Comment  which one) Anterior;Right #1 GR-Dressing/Treatment:  (Stimueln, Ioplex, Confrom tape coban)    Written Patient Dismissal Instructions Given            Electronically signed by CARMEN Victoria - CNP on 9/14/2022 at 2:45 PM

## 2022-09-28 ENCOUNTER — HOSPITAL ENCOUNTER (OUTPATIENT)
Dept: WOUND CARE | Age: 27
Discharge: HOME | End: 2022-09-28
Payer: COMMERCIAL

## 2022-09-28 VITALS
RESPIRATION RATE: 18 BRPM | DIASTOLIC BLOOD PRESSURE: 75 MMHG | SYSTOLIC BLOOD PRESSURE: 106 MMHG | HEART RATE: 112 BPM | TEMPERATURE: 96.1 F

## 2022-09-28 DIAGNOSIS — E11.621 DIABETIC ULCER OF RIGHT GREAT TOE (HCC): Primary | ICD-10-CM

## 2022-09-28 DIAGNOSIS — L97.512 CHRONIC ULCER OF GREAT TOE OF RIGHT FOOT WITH FAT LAYER EXPOSED (HCC): ICD-10-CM

## 2022-09-28 DIAGNOSIS — L97.519 DIABETIC ULCER OF RIGHT GREAT TOE (HCC): Primary | ICD-10-CM

## 2022-09-28 PROCEDURE — 11042 DBRDMT SUBQ TIS 1ST 20SQCM/<: CPT | Performed by: NURSE PRACTITIONER

## 2022-09-28 PROCEDURE — 11042 DBRDMT SUBQ TIS 1ST 20SQCM/<: CPT

## 2022-09-28 RX ORDER — CLOBETASOL PROPIONATE 0.5 MG/G
OINTMENT TOPICAL ONCE
Status: CANCELLED | OUTPATIENT
Start: 2022-09-28 | End: 2022-09-28

## 2022-09-28 RX ORDER — BACITRACIN ZINC AND POLYMYXIN B SULFATE 500; 1000 [USP'U]/G; [USP'U]/G
OINTMENT TOPICAL ONCE
Status: CANCELLED | OUTPATIENT
Start: 2022-09-28 | End: 2022-09-28

## 2022-09-28 RX ORDER — GENTAMICIN SULFATE 1 MG/G
OINTMENT TOPICAL ONCE
Status: CANCELLED | OUTPATIENT
Start: 2022-09-28 | End: 2022-09-28

## 2022-09-28 RX ORDER — LIDOCAINE 50 MG/G
OINTMENT TOPICAL ONCE
Status: CANCELLED | OUTPATIENT
Start: 2022-09-28 | End: 2022-09-28

## 2022-09-28 RX ORDER — LIDOCAINE 40 MG/G
CREAM TOPICAL ONCE
Status: CANCELLED | OUTPATIENT
Start: 2022-09-28 | End: 2022-09-28

## 2022-09-28 RX ORDER — LIDOCAINE HYDROCHLORIDE 20 MG/ML
JELLY TOPICAL ONCE
Status: CANCELLED | OUTPATIENT
Start: 2022-09-28 | End: 2022-09-28

## 2022-09-28 RX ORDER — GINSENG 100 MG
CAPSULE ORAL ONCE
Status: CANCELLED | OUTPATIENT
Start: 2022-09-28 | End: 2022-09-28

## 2022-09-28 RX ORDER — LIDOCAINE HYDROCHLORIDE 40 MG/ML
SOLUTION TOPICAL ONCE
Status: CANCELLED | OUTPATIENT
Start: 2022-09-28 | End: 2022-09-28

## 2022-09-28 RX ORDER — BETAMETHASONE DIPROPIONATE 0.05 %
OINTMENT (GRAM) TOPICAL ONCE
Status: CANCELLED | OUTPATIENT
Start: 2022-09-28 | End: 2022-09-28

## 2022-09-28 RX ORDER — BACITRACIN, NEOMYCIN, POLYMYXIN B 400; 3.5; 5 [USP'U]/G; MG/G; [USP'U]/G
OINTMENT TOPICAL ONCE
Status: CANCELLED | OUTPATIENT
Start: 2022-09-28 | End: 2022-09-28

## 2022-09-28 ASSESSMENT — PAIN DESCRIPTION - ONSET: ONSET: ON-GOING

## 2022-09-28 ASSESSMENT — PAIN - FUNCTIONAL ASSESSMENT: PAIN_FUNCTIONAL_ASSESSMENT: PREVENTS OR INTERFERES SOME ACTIVE ACTIVITIES AND ADLS

## 2022-09-28 ASSESSMENT — PAIN DESCRIPTION - FREQUENCY: FREQUENCY: INTERMITTENT

## 2022-09-28 ASSESSMENT — PAIN DESCRIPTION - LOCATION: LOCATION: TOE (COMMENT WHICH ONE)

## 2022-09-28 ASSESSMENT — PAIN SCALES - GENERAL: PAINLEVEL_OUTOF10: 0

## 2022-09-28 ASSESSMENT — PAIN DESCRIPTION - ORIENTATION: ORIENTATION: RIGHT

## 2022-09-28 ASSESSMENT — PAIN DESCRIPTION - PAIN TYPE: TYPE: ACUTE PAIN

## 2022-09-28 NOTE — PROGRESS NOTES
Wound Care Center Progress Note With Procedure    Kalee Stallworth  AGE: 32 y.o. GENDER: female  : 1995  EPISODE DATE:  2022     Subjective:     Chief Complaint   Patient presents with    Wound Check     Right Great Toe         HISTORY of PRESENT ILLNESS     Kalee Stallworth is a 32 y.o. female who presents to the [address] [place] [address] for a visit for evaluation and treatment of Chronic diabetic  ulcer(s) of  Rt. foot hallux. The condition is of moderate severity. The ulcer has been present since 2022. The underlying cause is thought to be diabetes. The patients care to date has included regular wound care in [place] . The patient has significant underlying medical conditions as below. Worse this week. Larger in size and wound more macerated. Patient did not feel good last week and missed her appointment. She states she has been changing the dressing as ordered  No other issues to report     Foot care: advised to wash feet daily, pat dry and apply lotion at night, avoiding between toes. Need to look at feet daily and report to a physician any signs of inflammation or skin damage. Discussed diabetes shoes and socks. Different diabetic medications. Managing high and low sugar readings. Patient educated on the 6 essential components necessary for wound healing: Circulation, Debridements, Proper Dressings and Topical Wound Products, Infection Control, Edema Control and Offloading. Patient educated on those factors that negatively effect or impact wound healing: smoking, obesity, uncontrolled diabetes, anticoagulant and immunosuppressive regimens, inadequate nutrition, untreated arterial and venous disease if applicable and measures to manage edema.        Wound Pain Timing/Severity: none  Quality of pain: N/A  Severity of pain:  0 / 10  Modifying Factors: diabetes  Associated Signs/Symptoms: erythema, drainage, and numbness        PAST MEDICAL HISTORY        Diagnosis Date Diabetes mellitus ([facility]        PAST SURGICAL HISTORY    History reviewed. No pertinent surgical history. FAMILY HISTORY    Family History   Problem Relation Age of Onset    Diabetes Father     Heart Disease Father     Depression Maternal Grandmother     Depression Maternal Grandfather     Depression Paternal Grandmother     Depression Paternal Grandfather        SOCIAL HISTORY    Social History     Tobacco Use    Smoking status: Former     Types: Cigarettes     Quit date: 2013     Years since quittin.7    Smokeless tobacco: Never    Tobacco comments:     EDUCATED DM II SLOWS WOUND HEALING   Substance Use Topics    Alcohol use: No    Drug use: No       ALLERGIES    No Known Allergies    MEDICATIONS    Current Outpatient Medications on File Prior to Encounter   Medication Sig Dispense Refill    busPIRone (BUSPAR) 5 MG tablet Take 5 mg by mouth 3 times daily as needed      lisinopril (PRINIVIL;ZESTRIL) 5 MG tablet Take 5 mg by mouth daily      metoclopramide (REGLAN) 10 MG tablet Take 10 mg by mouth 3 times daily      Dulaglutide (TRULICITY) 4.5 TD/0.6FT SOPN Inject 4.5 mg into the skin once a week      Cholecalciferol (VITAMIN D3) 50 MCG ( UT) CAPS Take 1 capsule by mouth daily      budesonide-formoterol (SYMBICORT) 80-4.5 MCG/ACT AERO Inhale 2 puffs into the lungs 2 times daily      atorvastatin (LIPITOR) 10 MG tablet Take 10 mg by mouth daily      Norgestim-Eth Estrad Triphasic (TRI-ESTARYLLA PO) Take 1 tablet by mouth daily      Empagliflozin-metFORMIN HCl ER (SYNJARDY XR) 12.5-1000 MG TB24 Take 1 tablet by mouth 2 times daily      vitamin C (ASCORBIC ACID) 500 MG tablet Take 1,000 mg by mouth daily      zinc gluconate 50 MG tablet Take 50 mg by mouth daily      naproxen (NAPROSYN) 500 MG tablet Take 1 tablet by mouth 2 times daily as needed for Pain 20 tablet 0     No current facility-administered medications on file prior to encounter.        REVIEW OF SYSTEMS    Pertinent items are noted in HPI.    Constitutional: Negative for systemic symptoms including fever, chills and malaise. Objective:      /75   Pulse (!) 112   Temp (!) 96.1 °F (35.6 °C) (Temporal)   Resp 18     PHYSICAL EXAM      General: The patient is in no acute distress. Mental status:  Patient is appropriate, is  oriented to place and plan of care. Dermatologic exam: Visual inspection of the periwound reveals the skin to be moist, clammy, and edematous  Wound exam: see wound description below in procedure note      Assessment:     Problem List Items Addressed This Visit          Endocrine    WD-Diabetic ulcer of right great toe ([address] - Primary    Relevant Orders    Initiate Outpatient Wound Care Protocol       Other    WD-Chronic ulcer of great toe of right foot with fat layer exposed ([address]    Relevant Orders    Initiate Outpatient Wound Care Protocol     Procedure Note    Indications:  Based on my examination of this patient's wound(s) today, sharp excision into necrotic subcutaneous tissue is required to promote healing and evaluate the extent of previous healing. Performed by: CARMEN López - CNP    Consent obtained: Yes    Time out taken:  Yes    Pain Control: N/A      Debridement:Excisional Debridement    Using curette, scissors, and forceps the wound(s) was/were sharply debrided down through and including the removal of subcutaneous tissue. Devitalized Tissue Debrided:  fibrin, biofilm, slough, and exudate    Pre Debridement Measurements:  Are located in the Wound Documentation Flow Sheet    All active wounds listed below with today's date are evaluated  Wound(s)    debrided this date include # : 1     Post  Debridement Measurements:  Wound 07/20/22 Toe (Comment  which one) Anterior;Right #1 GR (Active)   Wound Image   09/28/22 1336   Wound Etiology Diabetic 09/28/22 1336   Dressing Status New dressing applied;Clean;Dry; Intact 09/28/22 1424   Wound Cleansed Wound cleanser 09/28/22 1336   Offloading for Diabetic Foot Ulcers Offloading ordered 09/28/22 1336   Wound Length (cm) 1.3 cm 09/28/22 1336   Wound Width (cm) 0.9 cm 09/28/22 1336   Wound Depth (cm) 0.1 cm 09/28/22 1336   Wound Surface Area (cm^2) 1.17 cm^2 09/28/22 1336   Change in Wound Size % (l*w) -17 09/28/22 1336   Wound Volume (cm^3) 0.117 cm^3 09/28/22 1336   Wound Healing % -17 09/28/22 1336   Post-Procedure Length (cm) 1.3 cm 09/28/22 1343   Post-Procedure Width (cm) 0.9 cm 09/28/22 1343   Post-Procedure Depth (cm) 0.1 cm 09/28/22 1343   Post-Procedure Surface Area (cm^2) 1.17 cm^2 09/28/22 1343   Post-Procedure Volume (cm^3) 0.117 cm^3 09/28/22 1343   Distance Tunneling (cm) 0 cm 09/28/22 1336   Tunneling Position ___ O'Clock 0 09/28/22 1336   Undermining Starts ___ O'Clock 0 09/28/22 1336   Undermining Ends___ O'Clock 0 09/28/22 1336   Undermining Maxium Distance (cm) 0 09/28/22 1336   Wound Assessment Pink/red;Devitalized tissue 09/28/22 1336   Drainage Amount Small 09/28/22 1336   Drainage Description Yellow;Pink; Thin 09/28/22 1336   Odor None 09/28/22 1336   Annika-wound Assessment Maceration;Fragile 09/28/22 1336   Margins Defined edges 09/28/22 1336   Wound Thickness Description not for Pressure Injury Full thickness 09/28/22 1336   Number of days: 70       Percent of Wound(s) Debrided: approximately 100%    Total  Area  Debrided:  1.17 sq cm     Bleeding:  Minimal    Hemostasis Achieved:  by pressure    Procedural Pain:  0  / 10     Post Procedural Pain:  0 / 10     Response to treatment:  Well tolerated by patient. Status of wound progress and description from last visit:   We will have patient continue plan of care until next visit. She is on vacation next week and we encouraged her to keep this tioe as dry as possible.   Will re-evaluate plan of care when she return and go from there  Continue to monitor and follow up 2 weeks       Plan:       Discharge Instructions         [address]     NOTE: Upon discharge from the [address] [facility],[address], you will receive a patient experience survey. We would be grateful if you would take the time to fill this survey out. Wound care order history:                 MAX's   Right       Left               Date:              Cultures:                Grafts:                Antibiotics:           Continuing wound care orders and information:                 Residence:                Continue home health care with:              Your wound-care supplies will be provided by: Wound cleansing:              Do not scrub or use excessive force. Wash hands with soap and water before and after dressing changes. Prior to applying a clean dressing, cleanse wound with normal saline, wound cleanser, or mild soap and water. Ask the physician or nurse before getting the wound(s) wet in a shower     Daily Wound management:              Keep weight off wounds and reposition every 2 hours. Avoid standing for long periods of time. Apply wraps/stockings in AM and remove at bedtime. If swelling is present, elevate legs to the level of the heart or above for 30 minutes 4-5 times a day and/or when sitting. When taking antibiotics take entire prescription as ordered by physician do not stop taking until medicine is all gone. Orders for this week: 9/28/22     [facility] [address] [place] [address] in [address] with soap and water, pat dry. Apply Stimulen powder to wound bed. Cover with Ioplex and ca alginate. Secure with 1\" coban. Change daily. Follow up with Steph Roberson CNP in 2 weeks in the wound care center. Call [phone] for any questions or concerns.   Date__________   Time____________        Treatment Note Wound 07/20/22 Toe (Comment  which one) Anterior;Right #1 GR-Dressing/Treatment:  (Stimulen, Ioplex, Jacek Silva    Written Patient Dismissal Instructions Given            Electronically signed by CARMEN Tracy CNP on 9/28/2022 at 2:49 PM

## 2022-09-28 NOTE — DISCHARGE INSTRUCTIONS
PHYSICIAN ORDERS AND DISCHARGE INSTRUCTIONS     NOTE: Upon discharge from the [address], you will receive a patient experience survey. We would be grateful if you would take the time to fill this survey out. Wound care order history:                 MAX's   Right       Left               Date:              Cultures:                Grafts:                Antibiotics:           Continuing wound care orders and information:                 Residence:                Continue home health care with:              Your wound-care supplies will be provided by: Wound cleansing:              Do not scrub or use excessive force. Wash hands with soap and water before and after dressing changes. Prior to applying a clean dressing, cleanse wound with normal saline, wound cleanser, or mild soap and water. Ask the physician or nurse before getting the wound(s) wet in a shower     Daily Wound management:              Keep weight off wounds and reposition every 2 hours. Avoid standing for long periods of time. Apply wraps/stockings in AM and remove at bedtime. If swelling is present, elevate legs to the level of the heart or above for 30 minutes 4-5 times a day and/or when sitting. When taking antibiotics take entire prescription as ordered by physician do not stop taking until medicine is all gone. Orders for this week: 9/28/22     [facility] in [address] with soap and water, pat dry. Apply Stimulen powder to wound bed. Cover with Ioplex and ca alginate. Secure with 1\" coban. Change daily. Follow up with Dima Lazo CNP in 2 weeks in the wound care center. Call [phone] for any questions or concerns.   Date__________   Time____________

## 2022-10-12 ENCOUNTER — HOSPITAL ENCOUNTER (OUTPATIENT)
Dept: WOUND CARE | Age: 27
Discharge: HOME | End: 2022-10-12
Payer: COMMERCIAL

## 2022-10-12 VITALS
RESPIRATION RATE: 18 BRPM | DIASTOLIC BLOOD PRESSURE: 79 MMHG | TEMPERATURE: 96.9 F | SYSTOLIC BLOOD PRESSURE: 127 MMHG | HEART RATE: 100 BPM

## 2022-10-12 DIAGNOSIS — E11.621 DIABETIC ULCER OF RIGHT GREAT TOE (HCC): Primary | ICD-10-CM

## 2022-10-12 DIAGNOSIS — L97.519 DIABETIC ULCER OF RIGHT GREAT TOE (HCC): Primary | ICD-10-CM

## 2022-10-12 DIAGNOSIS — L97.512 CHRONIC ULCER OF GREAT TOE OF RIGHT FOOT WITH FAT LAYER EXPOSED (HCC): ICD-10-CM

## 2022-10-12 PROCEDURE — 11042 DBRDMT SUBQ TIS 1ST 20SQCM/<: CPT

## 2022-10-12 PROCEDURE — 11042 DBRDMT SUBQ TIS 1ST 20SQCM/<: CPT | Performed by: NURSE PRACTITIONER

## 2022-10-12 RX ORDER — BACITRACIN, NEOMYCIN, POLYMYXIN B 400; 3.5; 5 [USP'U]/G; MG/G; [USP'U]/G
OINTMENT TOPICAL ONCE
OUTPATIENT
Start: 2022-10-12 | End: 2022-10-12

## 2022-10-12 RX ORDER — BETAMETHASONE DIPROPIONATE 0.05 %
OINTMENT (GRAM) TOPICAL ONCE
OUTPATIENT
Start: 2022-10-12 | End: 2022-10-12

## 2022-10-12 RX ORDER — LIDOCAINE HYDROCHLORIDE 20 MG/ML
JELLY TOPICAL ONCE
OUTPATIENT
Start: 2022-10-12 | End: 2022-10-12

## 2022-10-12 RX ORDER — CLOBETASOL PROPIONATE 0.5 MG/G
OINTMENT TOPICAL ONCE
OUTPATIENT
Start: 2022-10-12 | End: 2022-10-12

## 2022-10-12 RX ORDER — GENTAMICIN SULFATE 1 MG/G
OINTMENT TOPICAL ONCE
OUTPATIENT
Start: 2022-10-12 | End: 2022-10-12

## 2022-10-12 RX ORDER — LIDOCAINE 50 MG/G
OINTMENT TOPICAL ONCE
OUTPATIENT
Start: 2022-10-12 | End: 2022-10-12

## 2022-10-12 RX ORDER — LIDOCAINE HYDROCHLORIDE 40 MG/ML
SOLUTION TOPICAL ONCE
OUTPATIENT
Start: 2022-10-12 | End: 2022-10-12

## 2022-10-12 RX ORDER — BACITRACIN ZINC AND POLYMYXIN B SULFATE 500; 1000 [USP'U]/G; [USP'U]/G
OINTMENT TOPICAL ONCE
OUTPATIENT
Start: 2022-10-12 | End: 2022-10-12

## 2022-10-12 RX ORDER — GINSENG 100 MG
CAPSULE ORAL ONCE
OUTPATIENT
Start: 2022-10-12 | End: 2022-10-12

## 2022-10-12 RX ORDER — LIDOCAINE 40 MG/G
CREAM TOPICAL ONCE
OUTPATIENT
Start: 2022-10-12 | End: 2022-10-12

## 2022-10-12 ASSESSMENT — PAIN - FUNCTIONAL ASSESSMENT: PAIN_FUNCTIONAL_ASSESSMENT: PREVENTS OR INTERFERES SOME ACTIVE ACTIVITIES AND ADLS

## 2022-10-12 ASSESSMENT — PAIN DESCRIPTION - DESCRIPTORS: DESCRIPTORS: SHOOTING;STABBING

## 2022-10-12 ASSESSMENT — PAIN DESCRIPTION - ORIENTATION: ORIENTATION: RIGHT

## 2022-10-12 ASSESSMENT — PAIN DESCRIPTION - PAIN TYPE: TYPE: ACUTE PAIN

## 2022-10-12 ASSESSMENT — PAIN SCALES - GENERAL: PAINLEVEL_OUTOF10: 6

## 2022-10-12 ASSESSMENT — PAIN DESCRIPTION - LOCATION: LOCATION: TOE (COMMENT WHICH ONE)

## 2022-10-12 ASSESSMENT — PAIN DESCRIPTION - FREQUENCY: FREQUENCY: INTERMITTENT

## 2022-10-12 ASSESSMENT — PAIN DESCRIPTION - ONSET: ONSET: ON-GOING

## 2022-10-12 NOTE — PROGRESS NOTES
Wound Care Center Progress Note With Procedure    Lizette Rosales  AGE: 32 y.o. GENDER: female  : 1995  EPISODE DATE:  10/12/2022     Subjective:     Chief Complaint   Patient presents with    Wound Check     Right Great Toe         HISTORY of PRESENT ILLNESS     Lizette Rosales is a 32 y.o. female who presents to the [address] [place] [address] for a visit for evaluation and treatment of Chronic diabetic  ulcer(s) of  Rt. foot hallux. The condition is of moderate severity. The ulcer has been present since 2022. The underlying cause is thought to be diabetes. The patients care to date has included regular wound care in [place] . The patient has significant underlying medical conditions as below. Patient back from vacation and wound is clean and slightly improved  Benefiting from keeping dry and occasional stimulin and gent to keep clean. Patient has a problem with maceration and moisture  Wound like to re-set this week and tweak plan of care slightly to see if this benefits her     Foot care: advised to wash feet daily, pat dry and apply lotion at night, avoiding between toes. Need to look at feet daily and report to a physician any signs of inflammation or skin damage. Discussed diabetes shoes and socks. Different diabetic medications. Managing high and low sugar readings. Patient educated on the 6 essential components necessary for wound healing: Circulation, Debridements, Proper Dressings and Topical Wound Products, Infection Control, Edema Control and Offloading. Patient educated on those factors that negatively effect or impact wound healing: smoking, obesity, uncontrolled diabetes, anticoagulant and immunosuppressive regimens, inadequate nutrition, untreated arterial and venous disease if applicable and measures to manage edema.        Wound Pain Timing/Severity: none  Quality of pain: N/A  Severity of pain:  0 / 10  Modifying Factors: diabetes  Associated Signs/Symptoms: erythema, drainage, and numbness        PAST MEDICAL HISTORY        Diagnosis Date    Diabetes mellitus ([facility] [address]        PAST SURGICAL HISTORY    History reviewed. No pertinent surgical history.     FAMILY HISTORY    Family History   Problem Relation Age of Onset    Diabetes Father     Heart Disease Father     Depression Maternal Grandmother     Depression Maternal Grandfather     Depression Paternal Grandmother     Depression Paternal Grandfather        SOCIAL HISTORY    Social History     Tobacco Use    Smoking status: Former     Types: Cigarettes     Quit date: 2013     Years since quittin.8    Smokeless tobacco: Never    Tobacco comments:     EDUCATED DM II SLOWS WOUND HEALING   Substance Use Topics    Alcohol use: No    Drug use: No       ALLERGIES    No Known Allergies    MEDICATIONS    Current Outpatient Medications on File Prior to Encounter   Medication Sig Dispense Refill    busPIRone (BUSPAR) 5 MG tablet Take 5 mg by mouth 3 times daily as needed      lisinopril (PRINIVIL;ZESTRIL) 5 MG tablet Take 5 mg by mouth daily      metoclopramide (REGLAN) 10 MG tablet Take 10 mg by mouth 3 times daily      Dulaglutide (TRULICITY) 4.5 SD/3.9SL SOPN Inject 4.5 mg into the skin once a week      Cholecalciferol (VITAMIN D3) 50 MCG ( UT) CAPS Take 1 capsule by mouth daily      budesonide-formoterol (SYMBICORT) 80-4.5 MCG/ACT AERO Inhale 2 puffs into the lungs 2 times daily      atorvastatin (LIPITOR) 10 MG tablet Take 10 mg by mouth daily      Norgestim-Eth Estrad Triphasic (TRI-ESTARYLLA PO) Take 1 tablet by mouth daily      Empagliflozin-metFORMIN HCl ER (SYNJARDY XR) 12.5-1000 MG TB24 Take 1 tablet by mouth 2 times daily      vitamin C (ASCORBIC ACID) 500 MG tablet Take 1,000 mg by mouth daily      zinc gluconate 50 MG tablet Take 50 mg by mouth daily      naproxen (NAPROSYN) 500 MG tablet Take 1 tablet by mouth 2 times daily as needed for Pain 20 tablet 0     No current facility-administered medications on file prior to encounter. REVIEW OF SYSTEMS    Pertinent items are noted in HPI. Constitutional: Negative for systemic symptoms including fever, chills and malaise. Objective:      /79   Pulse 100   Temp 96.9 °F (36.1 °C) (Temporal)   Resp 18     PHYSICAL EXAM      General: The patient is in no acute distress. Mental status:  Patient is appropriate, is  oriented to place and plan of care. Dermatologic exam: Visual inspection of the periwound reveals the skin to be normal in turgor and texture and dry  Wound exam: see wound description below in procedure note      Assessment:     Problem List Items Addressed This Visit          Endocrine    WD-Diabetic ulcer of right great toe ([address] - Primary    Relevant Orders    Initiate Outpatient Wound Care Protocol       Other    WD-Chronic ulcer of great toe of right foot with fat layer exposed ([address]    Relevant Orders    Initiate Outpatient Wound Care Protocol     Procedure Note    Indications:  Based on my examination of this patient's wound(s) today, sharp excision into necrotic subcutaneous tissue is required to promote healing and evaluate the extent of previous healing. Performed by: CARMEN Alexander - JESICA    Consent obtained: Yes    Time out taken:  Yes    Pain Control: N/a      Debridement:Excisional Debridement    Using #15 blade scalpel the wound(s) was/were sharply debrided down through and including the removal of subcutaneous tissue.         Devitalized Tissue Debrided:  fibrin, biofilm, slough, necrotic/eschar, exudate, and callus    Pre Debridement Measurements:  Are located in the Wound Documentation Flow Sheet    All active wounds listed below with today's date are evaluated  Wound(s)    debrided this date include # : 1     Post  Debridement Measurements:  Wound 07/20/22 Toe (Comment  which one) Anterior;Right #1 GR (Active)   Wound Image   10/12/22 1331   Wound Etiology Diabetic 10/12/22 1331   Dressing Status New dressing applied;Clean;Dry; Intact 09/28/22 1424   Wound Cleansed Wound cleanser 10/12/22 1331   Offloading for Diabetic Foot Ulcers Offloading ordered 10/12/22 1331   Wound Length (cm) 1.5 cm 10/12/22 1331   Wound Width (cm) 0.9 cm 10/12/22 1331   Wound Depth (cm) 0.1 cm 10/12/22 1331   Wound Surface Area (cm^2) 1.35 cm^2 10/12/22 1331   Change in Wound Size % (l*w) -35 10/12/22 1331   Wound Volume (cm^3) 0.135 cm^3 10/12/22 1331   Wound Healing % -35 10/12/22 1331   Post-Procedure Length (cm) 1.5 cm 10/12/22 1356   Post-Procedure Width (cm) 0.9 cm 10/12/22 1356   Post-Procedure Depth (cm) 0.1 cm 10/12/22 1356   Post-Procedure Surface Area (cm^2) 1.35 cm^2 10/12/22 1356   Post-Procedure Volume (cm^3) 0.135 cm^3 10/12/22 1356   Distance Tunneling (cm) 0 cm 10/12/22 1331   Tunneling Position ___ O'Clock 0 10/12/22 1331   Undermining Starts ___ O'Clock 0 10/12/22 1331   Undermining Ends___ O'Clock 0 10/12/22 1331   Undermining Maxium Distance (cm) 0 10/12/22 1331   Wound Assessment Pink/red;Slough; Devitalized tissue 10/12/22 1331   Drainage Amount Scant 10/12/22 1331   Drainage Description Yellow 10/12/22 1331   Odor None 10/12/22 1331   Annika-wound Assessment Hyperkeratosis (callous) 10/12/22 1331   Margins Defined edges 10/12/22 1331   Wound Thickness Description not for Pressure Injury Full thickness 10/12/22 1331   Number of days: 84       Wound 10/12/22 Right; Inner #2 Great Right Toe Inner (Active)   Wound Image   10/12/22 1344   Wound Etiology Diabetic 10/12/22 1344   Wound Cleansed Wound cleanser 10/12/22 1344   Offloading for Diabetic Foot Ulcers Offloading ordered 10/12/22 1344   Wound Length (cm) 0.5 cm 10/12/22 1344   Wound Width (cm) 0.7 cm 10/12/22 1344   Wound Depth (cm) 0.1 cm 10/12/22 1344   Wound Surface Area (cm^2) 0.35 cm^2 10/12/22 1344   Wound Volume (cm^3) 0.035 cm^3 10/12/22 1344   Post-Procedure Length (cm) 0.5 cm 10/12/22 1356   Post-Procedure Width (cm) 0.7 cm 10/12/22 1356   Post-Procedure Depth (cm) 0.1 cm 10/12/22 1356   Post-Procedure Surface Area (cm^2) 0.35 cm^2 10/12/22 1356   Post-Procedure Volume (cm^3) 0.035 cm^3 10/12/22 1356   Distance Tunneling (cm) 0 cm 10/12/22 1344   Tunneling Position ___ O'Clock 0 10/12/22 1344   Undermining Starts ___ O'Clock 0 10/12/22 1344   Undermining Ends___ O'Clock 0 10/12/22 1344   Undermining Maxium Distance (cm) 0 10/12/22 1344   Wound Assessment Devitalized tissue 10/12/22 1344   Drainage Amount Scant 10/12/22 1344   Drainage Description Clear 10/12/22 1344   Odor None 10/12/22 1344   Annika-wound Assessment Intact 10/12/22 1344   Margins Defined edges 10/12/22 1344   Wound Thickness Description not for Pressure Injury Full thickness 10/12/22 1344   Number of days: 0       Percent of Wound(s) Debrided: approximately 100%    Total  Area  Debrided:  0.35 sq cm     Bleeding:  Minimal    Hemostasis Achieved:  by pressure    Procedural Pain:  0  / 10     Post Procedural Pain:  0 / 10     Response to treatment:  Well tolerated by patient. Status of wound progress and description from last visit:   Stable and slightly improved  Patient educated on changes to dressing orders. Plan:       Discharge Instructions         PHYSICIAN ORDERS AND DISCHARGE INSTRUCTIONS     NOTE: Upon discharge from the [address], you will receive a patient experience survey. We would be grateful if you would take the time to fill this survey out. Wound care order history:                 MAX's   Right       Left               Date:              Cultures:                Grafts:                Antibiotics:           Continuing wound care orders and information:                 Residence:                Continue home health care with:              Your wound-care supplies will be provided by: Wound cleansing:              Do not scrub or use excessive force. Wash hands with soap and water before and after dressing changes.               Prior to applying a clean dressing, cleanse wound with normal saline, wound cleanser, or mild soap and water. Ask the physician or nurse before getting the wound(s) wet in a shower     Daily Wound management:              Keep weight off wounds and reposition every 2 hours. Avoid standing for long periods of time. Apply wraps/stockings in AM and remove at bedtime. If swelling is present, elevate legs to the level of the heart or above for 30 minutes 4-5 times a day and/or when sitting. When taking antibiotics take entire prescription as ordered by physician do not stop taking until medicine is all gone. Orders for this week: 10/12/22     [facility] [address] [place] [address] in [address] with soap and water, pat dry. Apply Nya to wound bed. Cover with Ioplex and ca alginate. Secure with 1\" coban. Change daily. Follow up with José Luis Carvalho CNP in 2 weeks in the wound care center. Call [phone] for any questions or concerns.   Date__________   Time____________        Treatment Note      Written Patient Dismissal Instructions Given            Electronically signed by Gerhardt Jobs, APRN - CNP on 10/12/2022 at 2:04 PM

## 2022-10-19 ENCOUNTER — HOSPITAL ENCOUNTER (OUTPATIENT)
Dept: WOUND CARE | Age: 27
Discharge: HOME | End: 2022-10-19

## 2022-10-26 ENCOUNTER — HOSPITAL ENCOUNTER (OUTPATIENT)
Dept: WOUND CARE | Age: 27
Discharge: HOME | End: 2022-10-26
Payer: COMMERCIAL

## 2022-10-26 VITALS
TEMPERATURE: 97.2 F | RESPIRATION RATE: 18 BRPM | SYSTOLIC BLOOD PRESSURE: 157 MMHG | DIASTOLIC BLOOD PRESSURE: 99 MMHG | HEART RATE: 108 BPM

## 2022-10-26 DIAGNOSIS — E11.621 DIABETIC ULCER OF RIGHT GREAT TOE (HCC): Primary | ICD-10-CM

## 2022-10-26 DIAGNOSIS — L97.512 CHRONIC ULCER OF GREAT TOE OF RIGHT FOOT WITH FAT LAYER EXPOSED (HCC): ICD-10-CM

## 2022-10-26 DIAGNOSIS — L97.519 DIABETIC ULCER OF RIGHT GREAT TOE (HCC): Primary | ICD-10-CM

## 2022-10-26 PROCEDURE — 11042 DBRDMT SUBQ TIS 1ST 20SQCM/<: CPT | Performed by: NURSE PRACTITIONER

## 2022-10-26 PROCEDURE — 11042 DBRDMT SUBQ TIS 1ST 20SQCM/<: CPT

## 2022-10-26 RX ORDER — LIDOCAINE 40 MG/G
CREAM TOPICAL ONCE
Status: CANCELLED | OUTPATIENT
Start: 2022-10-26 | End: 2022-10-26

## 2022-10-26 RX ORDER — LIDOCAINE 50 MG/G
OINTMENT TOPICAL ONCE
Status: CANCELLED | OUTPATIENT
Start: 2022-10-26 | End: 2022-10-26

## 2022-10-26 RX ORDER — CLOBETASOL PROPIONATE 0.5 MG/G
OINTMENT TOPICAL ONCE
Status: CANCELLED | OUTPATIENT
Start: 2022-10-26 | End: 2022-10-26

## 2022-10-26 RX ORDER — GINSENG 100 MG
CAPSULE ORAL ONCE
Status: CANCELLED | OUTPATIENT
Start: 2022-10-26 | End: 2022-10-26

## 2022-10-26 RX ORDER — LIDOCAINE HYDROCHLORIDE 20 MG/ML
JELLY TOPICAL ONCE
Status: CANCELLED | OUTPATIENT
Start: 2022-10-26 | End: 2022-10-26

## 2022-10-26 RX ORDER — GENTAMICIN SULFATE 1 MG/G
OINTMENT TOPICAL ONCE
Status: CANCELLED | OUTPATIENT
Start: 2022-10-26 | End: 2022-10-26

## 2022-10-26 RX ORDER — BETAMETHASONE DIPROPIONATE 0.05 %
OINTMENT (GRAM) TOPICAL ONCE
Status: CANCELLED | OUTPATIENT
Start: 2022-10-26 | End: 2022-10-26

## 2022-10-26 RX ORDER — LIDOCAINE HYDROCHLORIDE 40 MG/ML
SOLUTION TOPICAL ONCE
Status: CANCELLED | OUTPATIENT
Start: 2022-10-26 | End: 2022-10-26

## 2022-10-26 RX ORDER — BACITRACIN ZINC AND POLYMYXIN B SULFATE 500; 1000 [USP'U]/G; [USP'U]/G
OINTMENT TOPICAL ONCE
Status: CANCELLED | OUTPATIENT
Start: 2022-10-26 | End: 2022-10-26

## 2022-10-26 RX ORDER — BACITRACIN, NEOMYCIN, POLYMYXIN B 400; 3.5; 5 [USP'U]/G; MG/G; [USP'U]/G
OINTMENT TOPICAL ONCE
Status: CANCELLED | OUTPATIENT
Start: 2022-10-26 | End: 2022-10-26

## 2022-10-26 ASSESSMENT — PAIN SCALES - GENERAL: PAINLEVEL_OUTOF10: 0

## 2022-10-26 NOTE — PROGRESS NOTES
Wound Care Center Progress Note With Procedure    Cheri Alberto  AGE: 32 y.o. GENDER: female  : 1995  EPISODE DATE:  10/26/2022     Subjective:     Chief Complaint   Patient presents with    Wound Check     Right great toe          HISTORY of PRESENT ILLNESS     Cheri Alberto is a 32 y.o. female who presents to the [address] [place] [address] for a visit for evaluation and treatment of Chronic diabetic  ulcer(s) of  Rt. foot hallux. The condition is of moderate severity. The ulcer has been present since 2022. The underlying cause is thought to be diabetes. The patients care to date has included regular wound care in [place] . The patient has significant underlying medical conditions as below. Patient not seen last week due to illness  Maceration resolved but still having a hard time closing. Patient reports changing dressing as ordered     Foot care: advised to wash feet daily, pat dry and apply lotion at night, avoiding between toes. Need to look at feet daily and report to a physician any signs of inflammation or skin damage. Discussed diabetes shoes and socks. Different diabetic medications. Managing high and low sugar readings. Patient educated on the 6 essential components necessary for wound healing: Circulation, Debridements, Proper Dressings and Topical Wound Products, Infection Control, Edema Control and Offloading. Patient educated on those factors that negatively effect or impact wound healing: smoking, obesity, uncontrolled diabetes, anticoagulant and immunosuppressive regimens, inadequate nutrition, untreated arterial and venous disease if applicable and measures to manage edema.        Wound Pain Timing/Severity: none  Quality of pain: N/A  Severity of pain:  0 / 10  Modifying Factors: diabetes  Associated Signs/Symptoms: erythema, drainage, and numbness        PAST MEDICAL HISTORY        Diagnosis Date    Diabetes mellitus ([address]        PAST SURGICAL HISTORY    History reviewed. No pertinent surgical history. FAMILY HISTORY    Family History   Problem Relation Age of Onset    Diabetes Father     Heart Disease Father     Depression Maternal Grandmother     Depression Maternal Grandfather     Depression Paternal Grandmother     Depression Paternal Grandfather        SOCIAL HISTORY    Social History     Tobacco Use    Smoking status: Former     Types: Cigarettes     Quit date: 2013     Years since quittin.8    Smokeless tobacco: Never    Tobacco comments:     EDUCATED DM II SLOWS WOUND HEALING   Substance Use Topics    Alcohol use: No    Drug use: No       ALLERGIES    No Known Allergies    MEDICATIONS    Current Outpatient Medications on File Prior to Encounter   Medication Sig Dispense Refill    busPIRone (BUSPAR) 5 MG tablet Take 5 mg by mouth 3 times daily as needed      lisinopril (PRINIVIL;ZESTRIL) 5 MG tablet Take 5 mg by mouth daily      metoclopramide (REGLAN) 10 MG tablet Take 10 mg by mouth 3 times daily      Dulaglutide (TRULICITY) 4.5 HR/2.4NV SOPN Inject 4.5 mg into the skin once a week      Cholecalciferol (VITAMIN D3) 50 MCG (2000 UT) CAPS Take 1 capsule by mouth daily      budesonide-formoterol (SYMBICORT) 80-4.5 MCG/ACT AERO Inhale 2 puffs into the lungs 2 times daily      atorvastatin (LIPITOR) 10 MG tablet Take 10 mg by mouth daily      Norgestim-Eth Estrad Triphasic (TRI-ESTARYLLA PO) Take 1 tablet by mouth daily      Empagliflozin-metFORMIN HCl ER (SYNJARDY XR) 12.5-1000 MG TB24 Take 1 tablet by mouth 2 times daily      vitamin C (ASCORBIC ACID) 500 MG tablet Take 1,000 mg by mouth daily      zinc gluconate 50 MG tablet Take 50 mg by mouth daily      naproxen (NAPROSYN) 500 MG tablet Take 1 tablet by mouth 2 times daily as needed for Pain 20 tablet 0     No current facility-administered medications on file prior to encounter. REVIEW OF SYSTEMS    Pertinent items are noted in HPI.     Constitutional: Negative for systemic symptoms including fever, chills and malaise. Objective:      BP (!) 157/99   Pulse (!) 108   Temp 97.2 °F (36.2 °C) (Temporal)   Resp 18     PHYSICAL EXAM      General: The patient is in no acute distress. Mental status:  Patient is appropriate, is  oriented to place and plan of care. Dermatologic exam: Visual inspection of the periwound reveals the skin to be normal in turgor and texture, dry, and coarse  Wound exam: see wound description below in procedure note      Assessment:     Problem List Items Addressed This Visit          Endocrine    WD-Diabetic ulcer of right great toe ([address] - Primary    Relevant Orders    Initiate Outpatient Wound Care Protocol       Other    WD-Chronic ulcer of great toe of right foot with fat layer exposed ([address]    Relevant Orders    Initiate Outpatient Wound Care Protocol     Procedure Note    Indications:  Based on my examination of this patient's wound(s) today, sharp excision into necrotic subcutaneous tissue is required to promote healing and evaluate the extent of previous healing. Performed by: CARMEN Charlton - CNP    Consent obtained: Yes    Time out taken:  Yes    Pain Control: N/a      Debridement:Excisional Debridement    Using #15 blade scalpel and forceps the wound(s) was/were sharply debrided down through and including the removal of subcutaneous tissue. Devitalized Tissue Debrided:  fibrin, biofilm, slough, exudate, and callus    Pre Debridement Measurements:  Are located in the Wound Documentation Flow Sheet    All active wounds listed below with today's date are evaluated  Wound(s)    debrided this date include # : 1     Post  Debridement Measurements:  Wound 07/20/22 Toe (Comment  which one) Anterior;Right #1 GR (Active)   Wound Image   10/12/22 1331   Wound Etiology Diabetic 10/26/22 1334   Dressing Status New dressing applied;Clean;Dry; Intact 10/12/22 1415   Wound Cleansed Wound cleanser 10/26/22 1334   Offloading for Diabetic Foot Ulcers Offloading ordered 10/26/22 1334   Wound Length (cm) 2.2 cm 10/26/22 1334   Wound Width (cm) 1.5 cm 10/26/22 1334   Wound Depth (cm) 0.1 cm 10/26/22 1334   Wound Surface Area (cm^2) 3.3 cm^2 10/26/22 1334   Change in Wound Size % (l*w) -230 10/26/22 1334   Wound Volume (cm^3) 0.33 cm^3 10/26/22 1334   Wound Healing % -230 10/26/22 1334   Post-Procedure Length (cm) 2.2 cm 10/26/22 1413   Post-Procedure Width (cm) 1.5 cm 10/26/22 1413   Post-Procedure Depth (cm) 0.1 cm 10/26/22 1413   Post-Procedure Surface Area (cm^2) 3.3 cm^2 10/26/22 1413   Post-Procedure Volume (cm^3) 0.33 cm^3 10/26/22 1413   Distance Tunneling (cm) 0 cm 10/26/22 1334   Tunneling Position ___ O'Clock 0 10/26/22 1334   Undermining Starts ___ O'Clock 0 10/26/22 1334   Undermining Ends___ O'Clock 0 10/26/22 1334   Undermining Maxium Distance (cm) 0 10/26/22 1334   Wound Assessment Pink/red;Slough; Devitalized tissue 10/26/22 1334   Drainage Amount Scant 10/26/22 1334   Drainage Description Yellow 10/26/22 1334   Odor None 10/26/22 1334   Annika-wound Assessment Hyperkeratosis (callous) 10/26/22 1334   Margins Defined edges 10/26/22 1334   Wound Thickness Description not for Pressure Injury Full thickness 10/26/22 1334   Number of days: 98       Percent of Wound(s) Debrided: approximately 100%    Total  Area  Debrided:  3.3 sq cm     Bleeding:  Minimal    Hemostasis Achieved:  by pressure    Procedural Pain:  0  / 10     Post Procedural Pain:  0 / 10     Response to treatment:  Well tolerated by patient. Status of wound progress and description from last visit:   stable and clean. Continue plan of care for now and follow up 1 week       Plan:       Discharge Instructions         [address]     NOTE: Upon discharge from the [address], you will receive a patient experience survey. We would be grateful if you would take the time to fill this survey out.      Wound care order history:                 MAX's Right       Left               Date:              Cultures:                Grafts:                Antibiotics:           Continuing wound care orders and information:                 Residence:                Continue home health care with:              Your wound-care supplies will be provided by: Wound cleansing:              Do not scrub or use excessive force. Wash hands with soap and water before and after dressing changes. Prior to applying a clean dressing, cleanse wound with normal saline, wound cleanser, or mild soap and water. Ask the physician or nurse before getting the wound(s) wet in a shower     Daily Wound management:              Keep weight off wounds and reposition every 2 hours. Avoid standing for long periods of time. Apply wraps/stockings in AM and remove at bedtime. If swelling is present, elevate legs to the level of the heart or above for 30 minutes 4-5 times a day and/or when sitting. When taking antibiotics take entire prescription as ordered by physician do not stop taking until medicine is all gone. Orders for this week: 10/26/22     [facility] [address] [place] [address] in [address] with soap and water, pat dry. Apply Nya to wound bed. Cover with Ioplex and ca alginate. Secure with 1\" coban. Change daily. Follow up with Janina Quinn CNP in 2 weeks in the wound care center. Call [phone] for any questions or concerns.   Date__________   Time____________        Treatment Note      Written Patient Dismissal Instructions Given            Electronically signed by CARMEN Sharif CNP on 10/26/2022 at 2:18 PM

## 2022-11-01 NOTE — DISCHARGE INSTRUCTIONS
PHYSICIAN ORDERS AND DISCHARGE INSTRUCTIONS     NOTE: Upon discharge from the [address], you will receive a patient experience survey. We would be grateful if you would take the time to fill this survey out. Wound care order history:                 MAX's   Right       Left               Date:              Cultures:                Grafts:                Antibiotics:           Continuing wound care orders and information:                 Residence:                Continue home health care with:              Your wound-care supplies will be provided by: Wound cleansing:              Do not scrub or use excessive force. Wash hands with soap and water before and after dressing changes. Prior to applying a clean dressing, cleanse wound with normal saline, wound cleanser, or mild soap and water. Ask the physician or nurse before getting the wound(s) wet in a shower     Daily Wound management:              Keep weight off wounds and reposition every 2 hours. Avoid standing for long periods of time. Apply wraps/stockings in AM and remove at bedtime. If swelling is present, elevate legs to the level of the heart or above for 30 minutes 4-5 times a day and/or when sitting. When taking antibiotics take entire prescription as ordered by physician do not stop taking until medicine is all gone. Wound Care Notes:   Possible cast next week 11/2/22  [facility] [address] [facility] in [address] for this week: 11/2/22    Right Great Toe - Wash with soap and water, pat dry. Apply Nya to wound bed. Cover with Ioplex and ca alginate. (Give left over to pt)   Wrap with conform   Secure with 1\" coban. Change daily. Follow up with Elo Chong CNP in 1 week in the wound care center. Call [phone] for any questions or concerns.

## 2022-11-02 ENCOUNTER — HOSPITAL ENCOUNTER (OUTPATIENT)
Dept: WOUND CARE | Age: 27
Discharge: HOME | End: 2022-11-02
Payer: COMMERCIAL

## 2022-11-02 VITALS
SYSTOLIC BLOOD PRESSURE: 126 MMHG | TEMPERATURE: 97 F | DIASTOLIC BLOOD PRESSURE: 70 MMHG | HEART RATE: 99 BPM | RESPIRATION RATE: 18 BRPM

## 2022-11-02 DIAGNOSIS — L97.519 DIABETIC ULCER OF RIGHT GREAT TOE (HCC): ICD-10-CM

## 2022-11-02 DIAGNOSIS — L97.512 CHRONIC ULCER OF GREAT TOE OF RIGHT FOOT WITH FAT LAYER EXPOSED (HCC): Primary | ICD-10-CM

## 2022-11-02 DIAGNOSIS — E11.621 DIABETIC ULCER OF RIGHT GREAT TOE (HCC): ICD-10-CM

## 2022-11-02 PROCEDURE — 11042 DBRDMT SUBQ TIS 1ST 20SQCM/<: CPT | Performed by: NURSE PRACTITIONER

## 2022-11-02 PROCEDURE — 11042 DBRDMT SUBQ TIS 1ST 20SQCM/<: CPT

## 2022-11-02 NOTE — PROGRESS NOTES
Wound Care Center Progress Note With Procedure    Chanelklever Peña  AGE: 32 y.o. GENDER: female  : 1995  EPISODE DATE:  2022     Subjective:     Chief Complaint   Patient presents with    Wound Check     Right great toe          HISTORY of PRESENT ILLNESS     Chanelklever Peña is a 32 y.o. female who presents to the [address] [place] [address] for a visit for evaluation and treatment of Chronic diabetic  ulcer(s) of  Rt. foot hallux. The condition is of moderate severity. The ulcer has been present since 2022. The underlying cause is thought to be diabetes. The patients care to date has included regular wound care in [place] . The patient has significant underlying medical conditions as below. Patient not making much progress  Need to consider for TCC     Foot care: advised to wash feet daily, pat dry and apply lotion at night, avoiding between toes. Need to look at feet daily and report to a physician any signs of inflammation or skin damage. Discussed diabetes shoes and socks. Different diabetic medications. Managing high and low sugar readings. Patient educated on the 6 essential components necessary for wound healing: Circulation, Debridements, Proper Dressings and Topical Wound Products, Infection Control, Edema Control and Offloading. Patient educated on those factors that negatively effect or impact wound healing: smoking, obesity, uncontrolled diabetes, anticoagulant and immunosuppressive regimens, inadequate nutrition, untreated arterial and venous disease if applicable and measures to manage edema. Wound Pain Timing/Severity: none  Quality of pain: N/A  Severity of pain:  0 / 10  Modifying Factors: diabetes  Associated Signs/Symptoms: erythema, drainage, and numbness        PAST MEDICAL HISTORY        Diagnosis Date    Diabetes mellitus ([address]        PAST SURGICAL HISTORY    History reviewed. No pertinent surgical history.     FAMILY HISTORY    Family History Problem Relation Age of Onset    Diabetes Father     Heart Disease Father     Depression Maternal Grandmother     Depression Maternal Grandfather     Depression Paternal Grandmother     Depression Paternal Grandfather        SOCIAL HISTORY    Social History     Tobacco Use    Smoking status: Former     Types: Cigarettes     Quit date: 2013     Years since quittin.8    Smokeless tobacco: Never    Tobacco comments:     EDUCATED DM II SLOWS WOUND HEALING   Substance Use Topics    Alcohol use: No    Drug use: No       ALLERGIES    No Known Allergies    MEDICATIONS    Current Outpatient Medications on File Prior to Encounter   Medication Sig Dispense Refill    busPIRone (BUSPAR) 5 MG tablet Take 5 mg by mouth 3 times daily as needed      lisinopril (PRINIVIL;ZESTRIL) 5 MG tablet Take 5 mg by mouth daily      metoclopramide (REGLAN) 10 MG tablet Take 10 mg by mouth 3 times daily      Dulaglutide (TRULICITY) 4.5 TX/0.4IU SOPN Inject 4.5 mg into the skin once a week      Cholecalciferol (VITAMIN D3) 50 MCG (2000 UT) CAPS Take 1 capsule by mouth daily      budesonide-formoterol (SYMBICORT) 80-4.5 MCG/ACT AERO Inhale 2 puffs into the lungs 2 times daily      atorvastatin (LIPITOR) 10 MG tablet Take 10 mg by mouth daily      Norgestim-Eth Estrad Triphasic (TRI-ESTARYLLA PO) Take 1 tablet by mouth daily      Empagliflozin-metFORMIN HCl ER (SYNJARDY XR) 12.5-1000 MG TB24 Take 1 tablet by mouth 2 times daily      vitamin C (ASCORBIC ACID) 500 MG tablet Take 1,000 mg by mouth daily      zinc gluconate 50 MG tablet Take 50 mg by mouth daily      naproxen (NAPROSYN) 500 MG tablet Take 1 tablet by mouth 2 times daily as needed for Pain 20 tablet 0     No current facility-administered medications on file prior to encounter. REVIEW OF SYSTEMS    Pertinent items are noted in HPI. Constitutional: Negative for systemic symptoms including fever, chills and malaise.       Objective:      /70   Pulse 99   Temp 97 °F (36.1 °C) (Temporal)   Resp 18     PHYSICAL EXAM      General: The patient is in no acute distress. Mental status:  Patient is appropriate, is  oriented to place and plan of care. Dermatologic exam: Visual inspection of the periwound reveals the skin to be normal in turgor and texture and dry  Wound exam: see wound description below in procedure note      Assessment:     Problem List Items Addressed This Visit          Endocrine    WD-Diabetic ulcer of right great toe ([address]       Other    WD-Chronic ulcer of great toe of right foot with fat layer exposed ([address] - Primary     Procedure Note    Indications:  Based on my examination of this patient's wound(s) today, sharp excision into necrotic subcutaneous tissue is required to promote healing and evaluate the extent of previous healing. Performed by: CARMEN Grider - CNP    Consent obtained: Yes    Time out taken:  Yes    Pain Control: N/A      Debridement:Excisional Debridement    Using #15 blade scalpel the wound(s) was/were sharply debrided down through and including the removal of subcutaneous tissue. Devitalized Tissue Debrided:  fibrin, biofilm, slough, necrotic/eschar, and exudate    Pre Debridement Measurements:  Are located in the Wound Documentation Flow Sheet    All active wounds listed below with today's date are evaluated  Wound(s)    debrided this date include # : 1     Post  Debridement Measurements:  Wound 07/20/22 Toe (Comment  which one) Anterior;Right #1 GR (Active)   Wound Image   11/02/22 1332   Wound Etiology Diabetic 10/26/22 1424   Dressing Status New dressing applied;Clean;Dry; Intact 11/02/22 1358   Wound Cleansed Wound cleanser 11/02/22 1332   Offloading for Diabetic Foot Ulcers Offloading ordered 10/26/22 1424   Wound Length (cm) 1.4 cm 11/02/22 1332   Wound Width (cm) 0.8 cm 11/02/22 1332   Wound Depth (cm) 0.1 cm 11/02/22 1332   Wound Surface Area (cm^2) 1.12 cm^2 11/02/22 1332   Change in Wound Size % (l*w) -12 11/02/22 1332   Wound Volume (cm^3) 0.112 cm^3 11/02/22 1332   Wound Healing % -12 11/02/22 1332   Post-Procedure Length (cm) 1.4 cm 11/02/22 1349   Post-Procedure Width (cm) 0.8 cm 11/02/22 1349   Post-Procedure Depth (cm) 0.1 cm 11/02/22 1349   Post-Procedure Surface Area (cm^2) 1.12 cm^2 11/02/22 1349   Post-Procedure Volume (cm^3) 0.112 cm^3 11/02/22 1349   Distance Tunneling (cm) 0 cm 11/02/22 1332   Tunneling Position ___ O'Clock 0 11/02/22 1332   Undermining Starts ___ O'Clock 0 11/02/22 1332   Undermining Ends___ O'Clock 0 11/02/22 1332   Undermining Maxium Distance (cm) 0 11/02/22 1332   Wound Assessment Pink/red;Slough 11/02/22 1332   Drainage Amount Moderate 11/02/22 1332   Drainage Description Yellow 11/02/22 1332   Odor None 11/02/22 1332   Annika-wound Assessment Hyperkeratosis (callous) 11/02/22 1332   Margins Defined edges 11/02/22 1332   Wound Thickness Description not for Pressure Injury Full thickness 11/02/22 1332   Number of days: 105       Percent of Wound(s) Debrided: approximately 100%    Total  Area  Debrided:  1.12 sq cm     Bleeding:  Minimal    Hemostasis Achieved:  by pressure    Procedural Pain:  0  / 10     Post Procedural Pain:  0 / 10     Response to treatment:  Well tolerated by patient. Status of wound progress and description from last visit:   Educated patient on protocol for TCC  Will plan to apply next week. No driving and cant wear skinny jeans      Plan:       Discharge Instructions         PHYSICIAN ORDERS AND DISCHARGE INSTRUCTIONS     NOTE: Upon discharge from the [address], you will receive a patient experience survey. We would be grateful if you would take the time to fill this survey out.      Wound care order history:                 MAX's   Right       Left               Date:              Cultures:                Grafts:                Antibiotics:           Continuing wound care orders and information:                 Residence:                Continue home health care with:              Your wound-care supplies will be provided by: Wound cleansing:              Do not scrub or use excessive force. Wash hands with soap and water before and after dressing changes. Prior to applying a clean dressing, cleanse wound with normal saline, wound cleanser, or mild soap and water. Ask the physician or nurse before getting the wound(s) wet in a shower     Daily Wound management:              Keep weight off wounds and reposition every 2 hours. Avoid standing for long periods of time. Apply wraps/stockings in AM and remove at bedtime. If swelling is present, elevate legs to the level of the heart or above for 30 minutes 4-5 times a day and/or when sitting. When taking antibiotics take entire prescription as ordered by physician do not stop taking until medicine is all gone. Wound Care Notes:   Possible cast next week 11/2/22  [facility] [address] for this week: 11/2/22    Right Great Toe - Wash with soap and water, pat dry. Apply Nya to wound bed. Cover with Ioplex and ca alginate. (Give left over to pt)   Wrap with conform   Secure with 1\" coban. Change daily. Follow up with Annemarie Valdes CNP in 1 week in the wound care center. Call [phone] for any questions or concerns.         Treatment Note Wound 07/20/22 Toe (Comment  which one) Anterior;Right #1 GR-Dressing/Treatment:  (Nya, ioplex, Calcium algainte, conform, coban 1\")    Written Patient Dismissal Instructions Given            Electronically signed by CARMEN Estrada CNP on 11/2/2022 at 2:21 PM

## 2022-11-09 ENCOUNTER — HOSPITAL ENCOUNTER (OUTPATIENT)
Dept: WOUND CARE | Age: 27
Discharge: HOME | End: 2022-11-09
Payer: COMMERCIAL

## 2022-11-09 VITALS
TEMPERATURE: 97.1 F | RESPIRATION RATE: 18 BRPM | DIASTOLIC BLOOD PRESSURE: 79 MMHG | SYSTOLIC BLOOD PRESSURE: 124 MMHG | HEART RATE: 106 BPM

## 2022-11-09 DIAGNOSIS — E11.621 DIABETIC ULCER OF RIGHT GREAT TOE (HCC): Primary | ICD-10-CM

## 2022-11-09 DIAGNOSIS — L97.519 DIABETIC ULCER OF RIGHT GREAT TOE (HCC): Primary | ICD-10-CM

## 2022-11-09 DIAGNOSIS — L97.512 CHRONIC ULCER OF GREAT TOE OF RIGHT FOOT WITH FAT LAYER EXPOSED (HCC): ICD-10-CM

## 2022-11-09 PROCEDURE — 11042 DBRDMT SUBQ TIS 1ST 20SQCM/<: CPT

## 2022-11-09 PROCEDURE — 11042 DBRDMT SUBQ TIS 1ST 20SQCM/<: CPT | Performed by: NURSE PRACTITIONER

## 2022-11-09 RX ORDER — LIDOCAINE 50 MG/G
OINTMENT TOPICAL ONCE
Status: CANCELLED | OUTPATIENT
Start: 2022-11-09 | End: 2022-11-09

## 2022-11-09 RX ORDER — LIDOCAINE HYDROCHLORIDE 20 MG/ML
JELLY TOPICAL ONCE
Status: CANCELLED | OUTPATIENT
Start: 2022-11-09 | End: 2022-11-09

## 2022-11-09 RX ORDER — GINSENG 100 MG
CAPSULE ORAL ONCE
Status: CANCELLED | OUTPATIENT
Start: 2022-11-09 | End: 2022-11-09

## 2022-11-09 RX ORDER — CLOBETASOL PROPIONATE 0.5 MG/G
OINTMENT TOPICAL ONCE
Status: CANCELLED | OUTPATIENT
Start: 2022-11-09 | End: 2022-11-09

## 2022-11-09 RX ORDER — BETAMETHASONE DIPROPIONATE 0.05 %
OINTMENT (GRAM) TOPICAL ONCE
Status: CANCELLED | OUTPATIENT
Start: 2022-11-09 | End: 2022-11-09

## 2022-11-09 RX ORDER — BACITRACIN ZINC AND POLYMYXIN B SULFATE 500; 1000 [USP'U]/G; [USP'U]/G
OINTMENT TOPICAL ONCE
Status: CANCELLED | OUTPATIENT
Start: 2022-11-09 | End: 2022-11-09

## 2022-11-09 RX ORDER — GENTAMICIN SULFATE 1 MG/G
OINTMENT TOPICAL ONCE
Status: CANCELLED | OUTPATIENT
Start: 2022-11-09 | End: 2022-11-09

## 2022-11-09 RX ORDER — LIDOCAINE HYDROCHLORIDE 40 MG/ML
SOLUTION TOPICAL ONCE
Status: CANCELLED | OUTPATIENT
Start: 2022-11-09 | End: 2022-11-09

## 2022-11-09 RX ORDER — BACITRACIN, NEOMYCIN, POLYMYXIN B 400; 3.5; 5 [USP'U]/G; MG/G; [USP'U]/G
OINTMENT TOPICAL ONCE
Status: CANCELLED | OUTPATIENT
Start: 2022-11-09 | End: 2022-11-09

## 2022-11-09 RX ORDER — LIDOCAINE 40 MG/G
CREAM TOPICAL ONCE
Status: CANCELLED | OUTPATIENT
Start: 2022-11-09 | End: 2022-11-09

## 2022-11-09 ASSESSMENT — PAIN DESCRIPTION - ORIENTATION: ORIENTATION: RIGHT

## 2022-11-09 ASSESSMENT — PAIN - FUNCTIONAL ASSESSMENT: PAIN_FUNCTIONAL_ASSESSMENT: PREVENTS OR INTERFERES SOME ACTIVE ACTIVITIES AND ADLS

## 2022-11-09 ASSESSMENT — PAIN DESCRIPTION - FREQUENCY: FREQUENCY: INTERMITTENT

## 2022-11-09 ASSESSMENT — PAIN SCALES - GENERAL: PAINLEVEL_OUTOF10: 5

## 2022-11-09 ASSESSMENT — PAIN DESCRIPTION - ONSET: ONSET: ON-GOING

## 2022-11-09 ASSESSMENT — PAIN DESCRIPTION - LOCATION: LOCATION: TOE (COMMENT WHICH ONE)

## 2022-11-09 ASSESSMENT — PAIN DESCRIPTION - PAIN TYPE: TYPE: ACUTE PAIN

## 2022-11-09 NOTE — DISCHARGE INSTRUCTIONS
PHYSICIAN ORDERS AND DISCHARGE INSTRUCTIONS     NOTE: Upon discharge from the [address], you will receive a patient experience survey. We would be grateful if you would take the time to fill this survey out. Wound care order history:                 MAX's   Right       Left               Date:              Cultures:                Grafts:  Applied for grafts 11/9/22              Antibiotics:           Continuing wound care orders and information:                 Residence:                Continue home health care with:              Your wound-care supplies will be provided by: Wound cleansing:              Do not scrub or use excessive force. Wash hands with soap and water before and after dressing changes. Prior to applying a clean dressing, cleanse wound with normal saline, wound cleanser, or mild soap and water. Ask the physician or nurse before getting the wound(s) wet in a shower     Daily Wound management:              Keep weight off wounds and reposition every 2 hours. Avoid standing for long periods of time. Apply wraps/stockings in AM and remove at bedtime. If swelling is present, elevate legs to the level of the heart or above for 30 minutes 4-5 times a day and/or when sitting. When taking antibiotics take entire prescription as ordered by physician do not stop taking until medicine is all gone. Wound Care Notes:   Possible cast next week 11/2/22  [facility] in [address] for this week: 11/9/22     Right Great Toe - Wash with soap and water, pat dry. Apply Nya to wound bed. Cover with Ioplex and ca alginate. Apply Total Contact Cast.  Leave in place for 1 week. Follow up with Charlotte Arnold CNP in 1 week in the wound care center. Call [phone] for any questions or concerns.

## 2022-11-09 NOTE — PROGRESS NOTES
Wound Care Center Progress Note With Procedure    Joselyn Alexander  AGE: 32 y.o. GENDER: female  : 1995  EPISODE DATE:  2022     Subjective:     Chief Complaint   Patient presents with    Wound Check     Right great toe          HISTORY of PRESENT ILLNESS     Joselyn Alexander is a 32 y.o. female who presents to the [address] [place] [address] for a visit for evaluation and treatment of Chronic diabetic  ulcer(s) of  Rt. foot hallux. The condition is of moderate severity. The ulcer has been present since 2022. The underlying cause is thought to be diabetes. The patients care to date has included regular wound care in [place] . The patient has significant underlying medical conditions as below. TCC to be applied today  Lots of callus and wound unchanged  We applied to grafts last week but no word yet  Need to revisit this      Foot care: advised to wash feet daily, pat dry and apply lotion at night, avoiding between toes. Need to look at feet daily and report to a physician any signs of inflammation or skin damage. Discussed diabetes shoes and socks. Different diabetic medications. Managing high and low sugar readings. Patient educated on the 6 essential components necessary for wound healing: Circulation, Debridements, Proper Dressings and Topical Wound Products, Infection Control, Edema Control and Offloading. Patient educated on those factors that negatively effect or impact wound healing: smoking, obesity, uncontrolled diabetes, anticoagulant and immunosuppressive regimens, inadequate nutrition, untreated arterial and venous disease if applicable and measures to manage edema.        Wound Pain Timing/Severity: none  Quality of pain: N/A  Severity of pain:  0 / 10  Modifying Factors: diabetes  Associated Signs/Symptoms: erythema, drainage, and numbness                               PAST MEDICAL HISTORY        Diagnosis Date    Diabetes mellitus ([address]        PAST SURGICAL HISTORY    History reviewed. No pertinent surgical history. FAMILY HISTORY    Family History   Problem Relation Age of Onset    Diabetes Father     Heart Disease Father     Depression Maternal Grandmother     Depression Maternal Grandfather     Depression Paternal Grandmother     Depression Paternal Grandfather        SOCIAL HISTORY    Social History     Tobacco Use    Smoking status: Former     Types: Cigarettes     Quit date: 2013     Years since quittin.8    Smokeless tobacco: Never    Tobacco comments:     EDUCATED DM II SLOWS WOUND HEALING   Substance Use Topics    Alcohol use: No    Drug use: No       ALLERGIES    No Known Allergies    MEDICATIONS    Current Outpatient Medications on File Prior to Encounter   Medication Sig Dispense Refill    busPIRone (BUSPAR) 5 MG tablet Take 5 mg by mouth 3 times daily as needed      lisinopril (PRINIVIL;ZESTRIL) 5 MG tablet Take 5 mg by mouth daily      metoclopramide (REGLAN) 10 MG tablet Take 10 mg by mouth 3 times daily      Dulaglutide (TRULICITY) 4.5 EY/7.5NL SOPN Inject 4.5 mg into the skin once a week      Cholecalciferol (VITAMIN D3) 50 MCG (2000 UT) CAPS Take 1 capsule by mouth daily      budesonide-formoterol (SYMBICORT) 80-4.5 MCG/ACT AERO Inhale 2 puffs into the lungs 2 times daily      atorvastatin (LIPITOR) 10 MG tablet Take 10 mg by mouth daily      Norgestim-Eth Estrad Triphasic (TRI-ESTARYLLA PO) Take 1 tablet by mouth daily      Empagliflozin-metFORMIN HCl ER (SYNJARDY XR) 12.5-1000 MG TB24 Take 1 tablet by mouth 2 times daily      vitamin C (ASCORBIC ACID) 500 MG tablet Take 1,000 mg by mouth daily      zinc gluconate 50 MG tablet Take 50 mg by mouth daily      naproxen (NAPROSYN) 500 MG tablet Take 1 tablet by mouth 2 times daily as needed for Pain 20 tablet 0     No current facility-administered medications on file prior to encounter. REVIEW OF SYSTEMS    Pertinent items are noted in HPI.     Constitutional: Negative for systemic symptoms including fever, chills and malaise. Objective:      /79   Pulse (!) 106   Temp 97.1 °F (36.2 °C) (Temporal)   Resp 18     PHYSICAL EXAM      General: The patient is in no acute distress. Mental status:  Patient is appropriate, is  oriented to place and plan of care. Dermatologic exam: Visual inspection of the periwound reveals the skin to be normal in turgor and texture, dry, and coarse  Wound exam: see wound description below in procedure note      Assessment:     Problem List Items Addressed This Visit          Endocrine    WD-Diabetic ulcer of right great toe ([address] - Primary    Relevant Orders    Initiate Outpatient Wound Care Protocol       Other    WD-Chronic ulcer of great toe of right foot with fat layer exposed ([address]    Relevant Orders    Initiate Outpatient Wound Care Protocol     Procedure Note    Indications:  Based on my examination of this patient's wound(s) today, sharp excision into necrotic subcutaneous tissue is required to promote healing and evaluate the extent of previous healing. Performed by: CARMEN Laguna - CNP    Consent obtained: Yes    Time out taken:  Yes    Pain Control: N/A      Debridement:Excisional Debridement    Using #15 blade scalpel the wound(s) was/were sharply debrided down through and including the removal of subcutaneous tissue. Devitalized Tissue Debrided:  fibrin, biofilm, slough, necrotic/eschar, exudate, and callus    Pre Debridement Measurements:  Are located in the Wound Documentation Flow Sheet    All active wounds listed below with today's date are evaluated  Wound(s)    debrided this date include # : 1     Post  Debridement Measurements:  Wound 07/20/22 Toe (Comment  which one) Anterior;Right #1 GR (Active)   Wound Image   11/09/22 1324   Wound Etiology Diabetic 11/09/22 1349   Dressing Status New dressing applied;Clean;Dry; Intact 11/09/22 1349   Wound Cleansed Wound cleanser 11/09/22 1324   Offloading for Diabetic Foot Ulcers Offloading ordered 11/09/22 1349   Wound Length (cm) 1.1 cm 11/09/22 1324   Wound Width (cm) 0.8 cm 11/09/22 1324   Wound Depth (cm) 0.1 cm 11/09/22 1324   Wound Surface Area (cm^2) 0.88 cm^2 11/09/22 1324   Change in Wound Size % (l*w) 12 11/09/22 1324   Wound Volume (cm^3) 0.088 cm^3 11/09/22 1324   Wound Healing % 12 11/09/22 1324   Post-Procedure Length (cm) 2.5 cm 11/09/22 1341   Post-Procedure Width (cm) 1 cm 11/09/22 1341   Post-Procedure Depth (cm) 0.1 cm 11/09/22 1341   Post-Procedure Surface Area (cm^2) 2.5 cm^2 11/09/22 1341   Post-Procedure Volume (cm^3) 0.25 cm^3 11/09/22 1341   Distance Tunneling (cm) 0 cm 11/09/22 1324   Tunneling Position ___ O'Clock 0 11/09/22 1324   Undermining Starts ___ O'Clock 0 11/09/22 1324   Undermining Ends___ O'Clock 0 11/09/22 1324   Undermining Maxium Distance (cm) 0 11/09/22 1324   Wound Assessment Pink/red 11/09/22 1324   Drainage Amount Small 11/09/22 1324   Drainage Description Yellow 11/09/22 1324   Odor None 11/09/22 1324   Annika-wound Assessment Hyperkeratosis (callous) 11/09/22 1324   Margins Defined edges 11/09/22 1324   Wound Thickness Description not for Pressure Injury Full thickness 11/09/22 1324   Number of days: 112       Percent of Wound(s) Debrided: approximately 100%    Total  Area  Debrided:  0.88 sq cm     Bleeding:  Minimal    Hemostasis Achieved:  by pressure    Procedural Pain:  0  / 10     Post Procedural Pain:  0 / 10     Response to treatment:  Well tolerated by patient. Status of wound progress and description from last visit:   Stable and clean. Application of cast:    With the indications of casting being present and no contraindications, information and  instructions were given to the patient and the patient consented to the treatment. After proper wound care and appropriate padding. A total contact cast was applied according to protocol. The patient tolerated the procedure well.        Plan:       Discharge Instructions PHYSICIAN ORDERS AND DISCHARGE INSTRUCTIONS     NOTE: Upon discharge from the [address], you will receive a patient experience survey. We would be grateful if you would take the time to fill this survey out. Wound care order history:                 MAX's   Right       Left               Date:              Cultures:                Grafts:  Applied for grafts 11/9/22              Antibiotics:           Continuing wound care orders and information:                 Residence:                Continue home health care with:              Your wound-care supplies will be provided by: Wound cleansing:              Do not scrub or use excessive force. Wash hands with soap and water before and after dressing changes. Prior to applying a clean dressing, cleanse wound with normal saline, wound cleanser, or mild soap and water. Ask the physician or nurse before getting the wound(s) wet in a shower     Daily Wound management:              Keep weight off wounds and reposition every 2 hours. Avoid standing for long periods of time. Apply wraps/stockings in AM and remove at bedtime. If swelling is present, elevate legs to the level of the heart or above for 30 minutes 4-5 times a day and/or when sitting. When taking antibiotics take entire prescription as ordered by physician do not stop taking until medicine is all gone. Wound Care Notes:   Possible cast next week 11/2/22  [facility] in [address] for this week: 11/9/22     Right Great Toe - Wash with soap and water, pat dry. Apply Nya to wound bed. Cover with Ioplex and ca alginate. Apply Total Contact Cast.  Leave in place for 1 week. Follow up with Robson Don CNP in 1 week in the wound care center.   Call [phone] for any questions or concerns.         Treatment Note Wound 07/20/22 Toe (Comment  which one) Anterior;Right #1 GR-Dressing/Treatment:  (elly,ioplex,ca ag,tcc)    Written Patient Dismissal Instructions Given            Electronically signed by CARMEN Queen CNP on 11/9/2022 at 2:08 PM

## 2022-11-09 NOTE — PROGRESS NOTES
Insurance Verification Request      Karonmariajoseh:     Deepthi Reyez [address]  [facility] [address]  Yessica Scott Phone Number [phone]  Neda García RN  Facility NPI: [id number]  Facility Tax ID     Provider Information:     Provider's Name Charlotte ArnoldJESICA  Provider's NPI: [id number]  Provider's Address [address]    Patient Information:     Lizette Suarez   [phone]   : 1995  AGE: 32 y.o. GENDER: female   TODAYS DATE:  2022    Insurance:     PRIMARY INSURANCE:  Plan: [email or web address] COMM[employer]ITY [employer] PL[employer]  Coverage: [employer] COMMUNITY HEALTH PLAN  Effective Date: 2022  Group Number: [unfilled]  Subscriber Number: [id number] - (Medicaid Managed)    Payer/Plan Subscr  Sex Relation Sub.  Ins. ID Effective Group Num   1. MAICO COTTO* DELVIN GRAHAM 1995 Female Self [id number]                                    [address]       Application/product Information:     Benefit Verification Request:    Reason for Request: New Wound    [employer] FAX# [phone]    Trunk/Arms/Legs [address]9799 (1st 25 sq cm)    Candice Stone AM, and Affinity    Has patient been treated with any other Skin Substitute on this Wound:     No    If yes, How many previous applications: 1    WOUND #: 1    Total Square CM: 2.5    Procedure setting: Hospital Outpatient Department    Is the Patient currently in a skilled nursing facility: No     Is the wound work related: No    Procedure date: 22    Patient Information:     Dx Codes: E11.621, L97.519    Problem List Items Addressed This Visit          Endocrine    WD-Diabetic ulcer of right great toe ([address] - Primary    Relevant Orders    Initiate Outpatient Wound Care Protocol       Other    WD-Chronic ulcer of great toe of right foot with fat layer exposed ([address]    Relevant Orders    Initiate Outpatient Wound Care Protocol       No data recorded Is the Patient a Diabetic: Yes    HgBA1c:  No results found for: LABA1C     Wound 07/20/22 Toe (Comment  which one) Anterior;Right #1 GR (Active)   Wound Image   11/09/22 1324   Wound Etiology Diabetic 11/09/22 1349   Dressing Status New dressing applied;Clean;Dry; Intact 11/09/22 1349   Wound Cleansed Wound cleanser 11/09/22 1324   Offloading for Diabetic Foot Ulcers Offloading ordered 11/09/22 1349   Wound Length (cm) 1.1 cm 11/09/22 1324   Wound Width (cm) 0.8 cm 11/09/22 1324   Wound Depth (cm) 0.1 cm 11/09/22 1324   Wound Surface Area (cm^2) 0.88 cm^2 11/09/22 1324   Change in Wound Size % (l*w) 12 11/09/22 1324   Wound Volume (cm^3) 0.088 cm^3 11/09/22 1324   Wound Healing % 12 11/09/22 1324   Post-Procedure Length (cm) 2.5 cm 11/09/22 1341   Post-Procedure Width (cm) 1 cm 11/09/22 1341   Post-Procedure Depth (cm) 0.1 cm 11/09/22 1341   Post-Procedure Surface Area (cm^2) 2.5 cm^2 11/09/22 1341   Post-Procedure Volume (cm^3) 0.25 cm^3 11/09/22 1341   Distance Tunneling (cm) 0 cm 11/09/22 1324   Tunneling Position ___ O'Clock 0 11/09/22 1324   Undermining Starts ___ O'Clock 0 11/09/22 1324   Undermining Ends___ O'Clock 0 11/09/22 1324   Undermining Maxium Distance (cm) 0 11/09/22 1324   Wound Assessment Pink/red 11/09/22 1324   Drainage Amount Small 11/09/22 1324   Drainage Description Yellow 11/09/22 1324   Odor None 11/09/22 1324   Annika-wound Assessment Hyperkeratosis (callous) 11/09/22 1324   Margins Defined edges 11/09/22 1324   Wound Thickness Description not for Pressure Injury Full thickness 11/09/22 1324   Number of days: 112       Signature:     Electronically signed by Viktoria Augirre RN on 11/9/2022 at 4:22 PM

## 2022-11-16 ENCOUNTER — HOSPITAL ENCOUNTER (OUTPATIENT)
Dept: WOUND CARE | Age: 27
Discharge: HOME | End: 2022-11-16
Payer: COMMERCIAL

## 2022-11-16 VITALS
SYSTOLIC BLOOD PRESSURE: 128 MMHG | RESPIRATION RATE: 18 BRPM | TEMPERATURE: 96.6 F | DIASTOLIC BLOOD PRESSURE: 68 MMHG | HEART RATE: 114 BPM

## 2022-11-16 DIAGNOSIS — E11.621 DIABETIC ULCER OF RIGHT GREAT TOE (HCC): Primary | ICD-10-CM

## 2022-11-16 DIAGNOSIS — L97.512 CHRONIC ULCER OF GREAT TOE OF RIGHT FOOT WITH FAT LAYER EXPOSED (HCC): ICD-10-CM

## 2022-11-16 DIAGNOSIS — L97.519 DIABETIC ULCER OF RIGHT GREAT TOE (HCC): Primary | ICD-10-CM

## 2022-11-16 PROCEDURE — 11042 DBRDMT SUBQ TIS 1ST 20SQCM/<: CPT | Performed by: NURSE PRACTITIONER

## 2022-11-16 PROCEDURE — 11042 DBRDMT SUBQ TIS 1ST 20SQCM/<: CPT

## 2022-11-16 RX ORDER — CLOBETASOL PROPIONATE 0.5 MG/G
OINTMENT TOPICAL ONCE
Status: CANCELLED | OUTPATIENT
Start: 2022-11-16 | End: 2022-11-16

## 2022-11-16 RX ORDER — LIDOCAINE HYDROCHLORIDE 20 MG/ML
JELLY TOPICAL ONCE
Status: CANCELLED | OUTPATIENT
Start: 2022-11-16 | End: 2022-11-16

## 2022-11-16 RX ORDER — GINSENG 100 MG
CAPSULE ORAL ONCE
Status: CANCELLED | OUTPATIENT
Start: 2022-11-16 | End: 2022-11-16

## 2022-11-16 RX ORDER — LIDOCAINE 40 MG/G
CREAM TOPICAL ONCE
Status: CANCELLED | OUTPATIENT
Start: 2022-11-16 | End: 2022-11-16

## 2022-11-16 RX ORDER — LIDOCAINE HYDROCHLORIDE 40 MG/ML
SOLUTION TOPICAL ONCE
Status: CANCELLED | OUTPATIENT
Start: 2022-11-16 | End: 2022-11-16

## 2022-11-16 RX ORDER — BETAMETHASONE DIPROPIONATE 0.05 %
OINTMENT (GRAM) TOPICAL ONCE
Status: CANCELLED | OUTPATIENT
Start: 2022-11-16 | End: 2022-11-16

## 2022-11-16 RX ORDER — BACITRACIN ZINC AND POLYMYXIN B SULFATE 500; 1000 [USP'U]/G; [USP'U]/G
OINTMENT TOPICAL ONCE
Status: CANCELLED | OUTPATIENT
Start: 2022-11-16 | End: 2022-11-16

## 2022-11-16 RX ORDER — BACITRACIN, NEOMYCIN, POLYMYXIN B 400; 3.5; 5 [USP'U]/G; MG/G; [USP'U]/G
OINTMENT TOPICAL ONCE
Status: CANCELLED | OUTPATIENT
Start: 2022-11-16 | End: 2022-11-16

## 2022-11-16 RX ORDER — GENTAMICIN SULFATE 1 MG/G
OINTMENT TOPICAL ONCE
Status: CANCELLED | OUTPATIENT
Start: 2022-11-16 | End: 2022-11-16

## 2022-11-16 RX ORDER — LIDOCAINE 50 MG/G
OINTMENT TOPICAL ONCE
Status: CANCELLED | OUTPATIENT
Start: 2022-11-16 | End: 2022-11-16

## 2022-11-16 ASSESSMENT — PAIN - FUNCTIONAL ASSESSMENT: PAIN_FUNCTIONAL_ASSESSMENT: PREVENTS OR INTERFERES SOME ACTIVE ACTIVITIES AND ADLS

## 2022-11-16 ASSESSMENT — PAIN DESCRIPTION - LOCATION: LOCATION: TOE (COMMENT WHICH ONE)

## 2022-11-16 ASSESSMENT — PAIN SCALES - GENERAL: PAINLEVEL_OUTOF10: 4

## 2022-11-16 ASSESSMENT — PAIN DESCRIPTION - DESCRIPTORS: DESCRIPTORS: BURNING;STABBING;THROBBING

## 2022-11-16 ASSESSMENT — PAIN DESCRIPTION - ONSET: ONSET: ON-GOING

## 2022-11-16 ASSESSMENT — PAIN DESCRIPTION - PAIN TYPE: TYPE: ACUTE PAIN

## 2022-11-16 ASSESSMENT — PAIN DESCRIPTION - FREQUENCY: FREQUENCY: INTERMITTENT

## 2022-11-16 ASSESSMENT — PAIN DESCRIPTION - ORIENTATION: ORIENTATION: RIGHT

## 2022-11-16 NOTE — PROGRESS NOTES
Progress Note and application of total contact cast      Rafa Salcedo  AGE: 32 y.o. GENDER: female  : 1995  EPISODE DATE:  2022     Subjective:     Chief Complaint   Patient presents with    Wound Check     Right toe         HISTORY of PRESENT ILLNESS     Rafa Salcedo is a 32 y.o. female who presents to the [address] [place] [address] for a visit for evaluation and treatment of Chronic diabetic  ulcer(s) of  Rt. foot hallux. The condition is of moderate severity. The ulcer has been present since 2022. The underlying cause is thought to be diabetes. The patients care to date has included regular wound care in [place] . The patient has significant underlying medical conditions as below. TCC tolerated today. Wound much improved this week with offloading     Foot care: advised to wash feet daily, pat dry and apply lotion at night, avoiding between toes. Need to look at feet daily and report to a physician any signs of inflammation or skin damage. Discussed diabetes shoes and socks. Different diabetic medications. Managing high and low sugar readings. Patient educated on the 6 essential components necessary for wound healing: Circulation, Debridements, Proper Dressings and Topical Wound Products, Infection Control, Edema Control and Offloading. Patient educated on those factors that negatively effect or impact wound healing: smoking, obesity, uncontrolled diabetes, anticoagulant and immunosuppressive regimens, inadequate nutrition, untreated arterial and venous disease if applicable and measures to manage edema. Wound Pain Timing/Severity: none  Quality of pain: N/A  Severity of pain:  0 / 10  Modifying Factors: diabetes  Associated Signs/Symptoms: erythema, drainage, and numbness        PAST MEDICAL HISTORY        Diagnosis Date    Diabetes mellitus ([address]        PAST SURGICAL HISTORY    History reviewed. No pertinent surgical history.     FAMILY HISTORY    Family History   Problem Relation Age of Onset    Diabetes Father     Heart Disease Father     Depression Maternal Grandmother     Depression Maternal Grandfather     Depression Paternal Grandmother     Depression Paternal Grandfather        SOCIAL HISTORY    Social History     Tobacco Use    Smoking status: Former     Types: Cigarettes     Quit date: 2013     Years since quittin.9    Smokeless tobacco: Never    Tobacco comments:     EDUCATED DM II SLOWS WOUND HEALING   Substance Use Topics    Alcohol use: No    Drug use: No       ALLERGIES    No Known Allergies    MEDICATIONS    Current Outpatient Medications on File Prior to Encounter   Medication Sig Dispense Refill    busPIRone (BUSPAR) 5 MG tablet Take 5 mg by mouth 3 times daily as needed      lisinopril (PRINIVIL;ZESTRIL) 5 MG tablet Take 5 mg by mouth daily      metoclopramide (REGLAN) 10 MG tablet Take 10 mg by mouth 3 times daily      Dulaglutide (TRULICITY) 4.5 NC/9.4CI SOPN Inject 4.5 mg into the skin once a week      Cholecalciferol (VITAMIN D3) 50 MCG (2000 UT) CAPS Take 1 capsule by mouth daily      budesonide-formoterol (SYMBICORT) 80-4.5 MCG/ACT AERO Inhale 2 puffs into the lungs 2 times daily      atorvastatin (LIPITOR) 10 MG tablet Take 10 mg by mouth daily      Norgestim-Eth Estrad Triphasic (TRI-ESTARYLLA PO) Take 1 tablet by mouth daily      Empagliflozin-metFORMIN HCl ER (SYNJARDY XR) 12.5-1000 MG TB24 Take 1 tablet by mouth 2 times daily      vitamin C (ASCORBIC ACID) 500 MG tablet Take 1,000 mg by mouth daily      zinc gluconate 50 MG tablet Take 50 mg by mouth daily      naproxen (NAPROSYN) 500 MG tablet Take 1 tablet by mouth 2 times daily as needed for Pain 20 tablet 0     No current facility-administered medications on file prior to encounter. REVIEW OF SYSTEMS    Pertinent items are noted in HPI. Constitutional: Negative for systemic symptoms including fever, chills and malaise.     Objective:      /68   Pulse (!) 114 Temp (!) 96.6 °F (35.9 °C) (Temporal)   Resp 18     PHYSICAL EXAM      General: The patient is in no acute distress. Mental status:  Patient is appropriate, is  oriented to place and plan of care. Dermatologic exam: Visual inspection of the periwound reveals the skin to be normal in turgor and texture, dry, and edematous  Wound exam: see wound description below in procedure note      Assessment:     Problem List Items Addressed This Visit          Endocrine    WD-Diabetic ulcer of right great toe ([address] - Primary    Relevant Orders    Initiate Outpatient Wound Care Protocol       Other    WD-Chronic ulcer of great toe of right foot with fat layer exposed ([address]    Relevant Orders    Initiate Outpatient Wound Care Protocol       Procedure Note    Indications:  Based on my examination of this patient's wound(s) today, sharp excision into necrotic subcutaneous tissue is required to promote healing and evaluate the extent of previous healing. Performed by: CARMEN Murguia CNP    Consent obtained: Yes    Time out taken:  Yes    Pain Control: N/A      Debridement:Excisional Debridement    Using #15 blade scalpel the wound(s) was/were sharply debrided down through and including the removal of subcutaneous tissue. Devitalized Tissue Debrided:  fibrin, biofilm, slough, and callus    Pre Debridement Measurements:  Are located in the Wound Documentation Flow Sheet    All active wounds listed below with today's date are evaluated  Wound(s)    debrided this date include # : 1     Post  Debridement Measurements:  Wound 07/20/22 Toe (Comment  which one) Anterior;Right #1 GR (Active)   Wound Image   11/09/22 1324   Wound Etiology Diabetic 11/16/22 1334   Dressing Status New dressing applied;Clean;Dry; Intact 11/09/22 1349   Wound Cleansed Wound cleanser 11/16/22 1334   Offloading for Diabetic Foot Ulcers Offloading ordered 11/16/22 1334   Wound Length (cm) 0.8 cm 11/16/22 1334   Wound Width (cm) 0.8 cm 11/16/22 1334   Wound Depth (cm) 0.1 cm 11/16/22 1334   Wound Surface Area (cm^2) 0.64 cm^2 11/16/22 1334   Change in Wound Size % (l*w) 36 11/16/22 1334   Wound Volume (cm^3) 0.064 cm^3 11/16/22 1334   Wound Healing % 36 11/16/22 1334   Post-Procedure Length (cm) 0.8 cm 11/16/22 1356   Post-Procedure Width (cm) 0.8 cm 11/16/22 1356   Post-Procedure Depth (cm) 0.1 cm 11/16/22 1356   Post-Procedure Surface Area (cm^2) 0.64 cm^2 11/16/22 1356   Post-Procedure Volume (cm^3) 0.064 cm^3 11/16/22 1356   Distance Tunneling (cm) 0 cm 11/16/22 1334   Tunneling Position ___ O'Clock 0 11/16/22 1334   Undermining Starts ___ O'Clock 0 11/16/22 1334   Undermining Ends___ O'Clock 0 11/16/22 1334   Undermining Maxium Distance (cm) 0 11/16/22 1334   Wound Assessment Pink/red 11/16/22 1334   Drainage Amount Small 11/16/22 1334   Drainage Description Yellow 11/16/22 1334   Odor None 11/16/22 1334   Annika-wound Assessment Hyperkeratosis (callous) 11/16/22 1334   Margins Defined edges 11/16/22 1334   Wound Thickness Description not for Pressure Injury Full thickness 11/16/22 1334   Number of days: 119       Percent of Wound(s) Debrided: approximately 100%    Total  Area  Debrided:  0.64 sq cm     Bleeding:  Minimal    Hemostasis Achieved:  by pressure    Procedural Pain:  0  / 10     Post Procedural Pain:  0 / 10     Response to treatment:  Well tolerated by patient. Status of wound progress and description from last visit:   Much improved. Application of cast:    With the indications of casting being present and no contraindications, information and  instructions were given to the patient and the patient consented to the treatment. After proper wound care and appropriate padding. A total contact cast was applied according to protocol. The patient tolerated the procedure well.       Plan:       Discharge Instructions         PHYSICIAN ORDERS AND DISCHARGE INSTRUCTIONS     NOTE: Upon discharge from the [address], you will receive a patient experience survey. We would be grateful if you would take the time to fill this survey out. Wound care order history:                 MAX's   Right       Left               Date:              Cultures:                Grafts:  Applied for grafts 11/9/22              Antibiotics:           Continuing wound care orders and information:                 Residence:                Continue home health care with:              Your wound-care supplies will be provided by: Wound cleansing:              Do not scrub or use excessive force. Wash hands with soap and water before and after dressing changes. Prior to applying a clean dressing, cleanse wound with normal saline, wound cleanser, or mild soap and water. Ask the physician or nurse before getting the wound(s) wet in a shower     Daily Wound management:              Keep weight off wounds and reposition every 2 hours. Avoid standing for long periods of time. Apply wraps/stockings in AM and remove at bedtime. If swelling is present, elevate legs to the level of the heart or above for 30 minutes 4-5 times a day and/or when sitting. When taking antibiotics take entire prescription as ordered by physician do not stop taking until medicine is all gone. Wound Care Notes:   Possible cast next week 11/2/22  [facility] in [facility] for this week: 11/16/22     Right Great Toe - Wash with soap and water, pat dry. Apply Nya to wound bed. Cover with Ioplex and ca alginate. Apply Total Contact Cast.  Leave in place for 1 week. Follow up with Mathieu Solorio CNP in 1 week in the wound care center. Call [phone] for any questions or concerns.         Treatment Note      Written Patient Dismissal Instructions Given            Electronically signed by Ronda Eisenberg Mika Schaffer CNP on 11/16/2022 at 2:25 PM

## 2022-11-16 NOTE — PROGRESS NOTES
Contact Cast    NAME:  Jackie Saba  YOB: 1995  MEDICAL RECORD NUMBER:  [id number]  DATE:  11/16/2022    Goal:  Patient will maintain integrity of cast, avoid mobility hazards, and report complications that may occur (foul odor, pain, numbness, cracked cast). [x] Removed old contact cast if indicated and wash extremity with soap and water. [x] Applied ordered dressing. Applied per emilia baltazar np    Applied to right lower extremity  [x] Allow cast to dry. [x] Instructed patient to report to health care provider, including wound care center, any back pain, hip pain, or leg pain, numbness of toes, or any odor  coming from the cast.   [x] Instructed patient not to stick any foreign objects down into cast.   [x] Instructed patient to utilize assistive devices(crutches, cane or walker) as ordered   [] Instructed patient to continue offloading as directed.      Applied cast per  Guidelines    Electronically signed by Inga Logan LPN on 84/36/5692 at 2:44 PM

## 2022-11-16 NOTE — DISCHARGE INSTRUCTIONS
PHYSICIAN ORDERS AND DISCHARGE INSTRUCTIONS     NOTE: Upon discharge from the [address], you will receive a patient experience survey. We would be grateful if you would take the time to fill this survey out. Wound care order history:                 MAX's   Right       Left               Date:              Cultures:                Grafts:  Applied for grafts 11/9/22              Antibiotics:           Continuing wound care orders and information:                 Residence:                Continue home health care with:              Your wound-care supplies will be provided by: Wound cleansing:              Do not scrub or use excessive force. Wash hands with soap and water before and after dressing changes. Prior to applying a clean dressing, cleanse wound with normal saline, wound cleanser, or mild soap and water. Ask the physician or nurse before getting the wound(s) wet in a shower     Daily Wound management:              Keep weight off wounds and reposition every 2 hours. Avoid standing for long periods of time. Apply wraps/stockings in AM and remove at bedtime. If swelling is present, elevate legs to the level of the heart or above for 30 minutes 4-5 times a day and/or when sitting. When taking antibiotics take entire prescription as ordered by physician do not stop taking until medicine is all gone. Wound Care Notes:   Possible cast next week 11/2/22  [facility] in [facility] for this week: 11/16/22     Right Great Toe - Wash with soap and water, pat dry. Apply Nya to wound bed. Cover with Ioplex and ca alginate. Apply Total Contact Cast.  Leave in place for 1 week. Follow up with Rhianna Jensen CNP in 1 week in the wound care center. Call [phone] for any questions or concerns.

## 2022-11-23 ENCOUNTER — HOSPITAL ENCOUNTER (OUTPATIENT)
Dept: WOUND CARE | Age: 27
Discharge: HOME | End: 2022-11-23
Payer: COMMERCIAL

## 2022-11-23 VITALS
SYSTOLIC BLOOD PRESSURE: 117 MMHG | TEMPERATURE: 96.9 F | RESPIRATION RATE: 20 BRPM | HEART RATE: 117 BPM | DIASTOLIC BLOOD PRESSURE: 59 MMHG

## 2022-11-23 DIAGNOSIS — L97.512 CHRONIC ULCER OF GREAT TOE OF RIGHT FOOT WITH FAT LAYER EXPOSED (HCC): Primary | ICD-10-CM

## 2022-11-23 DIAGNOSIS — L97.519 DIABETIC ULCER OF RIGHT GREAT TOE (HCC): ICD-10-CM

## 2022-11-23 DIAGNOSIS — E11.621 DIABETIC ULCER OF RIGHT GREAT TOE (HCC): ICD-10-CM

## 2022-11-23 PROCEDURE — 11042 DBRDMT SUBQ TIS 1ST 20SQCM/<: CPT

## 2022-11-23 PROCEDURE — 11042 DBRDMT SUBQ TIS 1ST 20SQCM/<: CPT | Performed by: NURSE PRACTITIONER

## 2022-11-23 RX ORDER — BACITRACIN ZINC AND POLYMYXIN B SULFATE 500; 1000 [USP'U]/G; [USP'U]/G
OINTMENT TOPICAL ONCE
Status: CANCELLED | OUTPATIENT
Start: 2022-11-23 | End: 2022-11-23

## 2022-11-23 RX ORDER — LIDOCAINE 40 MG/G
CREAM TOPICAL ONCE
Status: CANCELLED | OUTPATIENT
Start: 2022-11-23 | End: 2022-11-23

## 2022-11-23 RX ORDER — CLOBETASOL PROPIONATE 0.5 MG/G
OINTMENT TOPICAL ONCE
Status: CANCELLED | OUTPATIENT
Start: 2022-11-23 | End: 2022-11-23

## 2022-11-23 RX ORDER — GENTAMICIN SULFATE 1 MG/G
OINTMENT TOPICAL ONCE
Status: CANCELLED | OUTPATIENT
Start: 2022-11-23 | End: 2022-11-23

## 2022-11-23 RX ORDER — LIDOCAINE HYDROCHLORIDE 20 MG/ML
JELLY TOPICAL ONCE
Status: CANCELLED | OUTPATIENT
Start: 2022-11-23 | End: 2022-11-23

## 2022-11-23 RX ORDER — BACITRACIN, NEOMYCIN, POLYMYXIN B 400; 3.5; 5 [USP'U]/G; MG/G; [USP'U]/G
OINTMENT TOPICAL ONCE
Status: CANCELLED | OUTPATIENT
Start: 2022-11-23 | End: 2022-11-23

## 2022-11-23 RX ORDER — GINSENG 100 MG
CAPSULE ORAL ONCE
Status: CANCELLED | OUTPATIENT
Start: 2022-11-23 | End: 2022-11-23

## 2022-11-23 RX ORDER — BETAMETHASONE DIPROPIONATE 0.05 %
OINTMENT (GRAM) TOPICAL ONCE
Status: CANCELLED | OUTPATIENT
Start: 2022-11-23 | End: 2022-11-23

## 2022-11-23 RX ORDER — LIDOCAINE 50 MG/G
OINTMENT TOPICAL ONCE
Status: CANCELLED | OUTPATIENT
Start: 2022-11-23 | End: 2022-11-23

## 2022-11-23 RX ORDER — LIDOCAINE HYDROCHLORIDE 40 MG/ML
SOLUTION TOPICAL ONCE
Status: CANCELLED | OUTPATIENT
Start: 2022-11-23 | End: 2022-11-23

## 2022-11-23 ASSESSMENT — PAIN SCALES - GENERAL: PAINLEVEL_OUTOF10: 6

## 2022-11-23 ASSESSMENT — PAIN DESCRIPTION - ORIENTATION: ORIENTATION: RIGHT

## 2022-11-23 ASSESSMENT — PAIN - FUNCTIONAL ASSESSMENT: PAIN_FUNCTIONAL_ASSESSMENT: PREVENTS OR INTERFERES SOME ACTIVE ACTIVITIES AND ADLS

## 2022-11-23 ASSESSMENT — PAIN DESCRIPTION - LOCATION: LOCATION: TOE (COMMENT WHICH ONE)

## 2022-11-23 ASSESSMENT — PAIN DESCRIPTION - FREQUENCY: FREQUENCY: INTERMITTENT

## 2022-11-23 ASSESSMENT — PAIN DESCRIPTION - PAIN TYPE: TYPE: ACUTE PAIN

## 2022-11-23 NOTE — DISCHARGE INSTRUCTIONS
PHYSICIAN ORDERS AND DISCHARGE INSTRUCTIONS     NOTE: Upon discharge from the [address], you will receive a patient experience survey. We would be grateful if you would take the time to fill this survey out. Wound care order history:                 MAX's   Right       Left               Date:              Cultures:                Grafts:  Applied for grafts 11/9/22              Antibiotics:           Continuing wound care orders and information:                 Residence:                Continue home health care with:              Your wound-care supplies will be provided by: Wound cleansing:              Do not scrub or use excessive force. Wash hands with soap and water before and after dressing changes. Prior to applying a clean dressing, cleanse wound with normal saline, wound cleanser, or mild soap and water. Ask the physician or nurse before getting the wound(s) wet in a shower     Daily Wound management:              Keep weight off wounds and reposition every 2 hours. Avoid standing for long periods of time. Apply wraps/stockings in AM and remove at bedtime. If swelling is present, elevate legs to the level of the heart or above for 30 minutes 4-5 times a day and/or when sitting. When taking antibiotics take entire prescription as ordered by physician do not stop taking until medicine is all gone. Wound Care Notes:   Possible cast next week 11/2/22  [facility] in [facility] for this week: 11/23/22     Right Great Toe - Wash with soap and water, pat dry. Apply Nya to wound bed. Cover with Ioplex and ca alginate. Wrap with Conform and secure with tape. Change daily. Give forefoot  today in clinic 11/23/22 if client doesn't already have one.        Follow up with Zachery Richard CNP in 1 week in the wound care center. Call [phone] for any questions or concerns.

## 2022-11-23 NOTE — PROGRESS NOTES
Wound Care Center Progress Note With Procedure    Beth Tamayo  AGE: 32 y.o. GENDER: female  : 1995  EPISODE DATE:  2022     Subjective:     Chief Complaint   Patient presents with    Wound Check     Left foot         HISTORY of PRESENT ILLNESS        Beth Tamayo is a 32 y.o. female who presents to the [address] [place] [address] for a visit for evaluation and treatment of Chronic diabetic  ulcer(s) of  Rt. foot hallux. The condition is of moderate severity. The ulcer has been present since 2022. The underlying cause is thought to be diabetes. The patients care to date has included regular wound care in [place] . The patient has significant underlying medical conditions as below. Patient states that she cannot wear a TCC anymore due to danger of falls and wants to pursue another treatment plan  Wound is shallow and continues to improve rapidly with cast      Foot care: advised to wash feet daily, pat dry and apply lotion at night, avoiding between toes. Need to look at feet daily and report to a physician any signs of inflammation or skin damage. Discussed diabetes shoes and socks. Different diabetic medications. Managing high and low sugar readings. Patient educated on the 6 essential components necessary for wound healing: Circulation, Debridements, Proper Dressings and Topical Wound Products, Infection Control, Edema Control and Offloading. Patient educated on those factors that negatively effect or impact wound healing: smoking, obesity, uncontrolled diabetes, anticoagulant and immunosuppressive regimens, inadequate nutrition, untreated arterial and venous disease if applicable and measures to manage edema.        Wound Pain Timing/Severity: none  Quality of pain: N/A  Severity of pain:  0 / 10  Modifying Factors: diabetes  Associated Signs/Symptoms: erythema, drainage, and numbness        PAST MEDICAL HISTORY        Diagnosis Date    Diabetes mellitus ([address] PAST SURGICAL HISTORY    History reviewed. No pertinent surgical history. FAMILY HISTORY    Family History   Problem Relation Age of Onset    Diabetes Father     Heart Disease Father     Depression Maternal Grandmother     Depression Maternal Grandfather     Depression Paternal Grandmother     Depression Paternal Grandfather        SOCIAL HISTORY    Social History     Tobacco Use    Smoking status: Former     Types: Cigarettes     Quit date: 2013     Years since quittin.9    Smokeless tobacco: Never    Tobacco comments:     EDUCATED DM II SLOWS WOUND HEALING   Substance Use Topics    Alcohol use: No    Drug use: No       ALLERGIES    No Known Allergies    MEDICATIONS    Current Outpatient Medications on File Prior to Encounter   Medication Sig Dispense Refill    busPIRone (BUSPAR) 5 MG tablet Take 5 mg by mouth 3 times daily as needed      lisinopril (PRINIVIL;ZESTRIL) 5 MG tablet Take 5 mg by mouth daily      metoclopramide (REGLAN) 10 MG tablet Take 10 mg by mouth 3 times daily      Dulaglutide (TRULICITY) 4.5 KF/1.2HS SOPN Inject 4.5 mg into the skin once a week      Cholecalciferol (VITAMIN D3) 50 MCG (2000 UT) CAPS Take 1 capsule by mouth daily      budesonide-formoterol (SYMBICORT) 80-4.5 MCG/ACT AERO Inhale 2 puffs into the lungs 2 times daily      atorvastatin (LIPITOR) 10 MG tablet Take 10 mg by mouth daily      Norgestim-Eth Estrad Triphasic (TRI-ESTARYLLA PO) Take 1 tablet by mouth daily      Empagliflozin-metFORMIN HCl ER (SYNJARDY XR) 12.5-1000 MG TB24 Take 1 tablet by mouth 2 times daily      vitamin C (ASCORBIC ACID) 500 MG tablet Take 1,000 mg by mouth daily      zinc gluconate 50 MG tablet Take 50 mg by mouth daily      naproxen (NAPROSYN) 500 MG tablet Take 1 tablet by mouth 2 times daily as needed for Pain 20 tablet 0     No current facility-administered medications on file prior to encounter. REVIEW OF SYSTEMS    Pertinent items are noted in HPI.     Constitutional: Negative for systemic symptoms including fever, chills and malaise. Objective:      BP (!) 117/59   Pulse (!) 117   Temp 96.9 °F (36.1 °C) (Temporal)   Resp 20     PHYSICAL EXAM      General: The patient is in no acute distress. Mental status:  Patient is appropriate, is  oriented to place and plan of care. Dermatologic exam: Visual inspection of the periwound reveals the skin to be normal in turgor and texture, dry, and coarse  Wound exam: see wound description below in procedure note      Assessment:     Problem List Items Addressed This Visit          Endocrine    WD-Diabetic ulcer of right great toe [facility])    Relevant Orders    Initiate Outpatient Wound Care Protocol       Other    WD-Chronic ulcer of great toe of right foot with fat layer exposed ([address] - Primary    Relevant Orders    Initiate Outpatient Wound Care Protocol     Procedure Note    Indications:  Based on my examination of this patient's wound(s) today, sharp excision into necrotic subcutaneous tissue is required to promote healing and evaluate the extent of previous healing. Performed by: Gerhardt Jobs, APRN - CNP    Consent obtained: Yes    Time out taken:  Yes    Pain Control: N/A      Debridement:Excisional Debridement    Using curette, scissors, and forceps the wound(s) was/were sharply debrided down through and including the removal of subcutaneous tissue. Devitalized Tissue Debrided:  fibrin, biofilm, slough, exudate, and callus    Pre Debridement Measurements:  Are located in the Wound Documentation Flow Sheet    All active wounds listed below with today's date are evaluated  Wound(s)    debrided this date include # : 1     Post  Debridement Measurements:  Wound 07/20/22 Toe (Comment  which one) Anterior;Right #1 GR (Active)   Wound Image   11/23/22 1405   Wound Etiology Diabetic 11/23/22 1405   Dressing Status New dressing applied;Clean;Dry; Intact 11/16/22 1443   Wound Cleansed Wound cleanser 11/23/22 1405 Offloading for Diabetic Foot Ulcers Total contact cast 11/23/22 1405   Wound Length (cm) 0.5 cm 11/23/22 1405   Wound Width (cm) 0.5 cm 11/23/22 1405   Wound Depth (cm) 0.1 cm 11/23/22 1405   Wound Surface Area (cm^2) 0.25 cm^2 11/23/22 1405   Change in Wound Size % (l*w) 75 11/23/22 1405   Wound Volume (cm^3) 0.025 cm^3 11/23/22 1405   Wound Healing % 75 11/23/22 1405   Post-Procedure Length (cm) 0.5 cm 11/23/22 1415   Post-Procedure Width (cm) 0.5 cm 11/23/22 1415   Post-Procedure Depth (cm) 0.1 cm 11/23/22 1415   Post-Procedure Surface Area (cm^2) 0.25 cm^2 11/23/22 1415   Post-Procedure Volume (cm^3) 0.025 cm^3 11/23/22 1415   Distance Tunneling (cm) 0 cm 11/23/22 1405   Tunneling Position ___ O'Clock 0 11/23/22 1405   Undermining Starts ___ O'Clock 0 11/23/22 1405   Undermining Ends___ O'Clock 0 11/23/22 1405   Undermining Maxium Distance (cm) 0 11/23/22 1405   Wound Assessment Granulation tissue 11/23/22 1405   Drainage Amount Small 11/23/22 1405   Drainage Description Yellow 11/23/22 1405   Odor None 11/23/22 1405   Annika-wound Assessment Hyperkeratosis (callous) 11/23/22 1405   Margins Defined edges 11/23/22 1405   Wound Thickness Description not for Pressure Injury Full thickness 11/23/22 1405   Number of days: 126       Percent of Wound(s) Debrided: approximately 100%    Total  Area  Debrided:  0.25 sq cm     Bleeding:  Minimal    Hemostasis Achieved:  by pressure    Procedural Pain:  0  / 10     Post Procedural Pain:  0 / 10     Response to treatment:  Well tolerated by patient. Status of wound progress and description from last visit:   Much improved.     Patient has supplies from daily change orders previously  Patient agrees to TXU Juno shoe, and education given on offloading at all times when up    Follow up next week and continue to monitor   Plan of care updated on discharge instructions      Plan:       Discharge Instructions         [address]     NOTE: Upon discharge from the [address] [facility],[address], you will receive a patient experience survey. We would be grateful if you would take the time to fill this survey out. Wound care order history:                 MAX's   Right       Left               Date:              Cultures:                Grafts:  Applied for grafts 11/9/22              Antibiotics:           Continuing wound care orders and information:                 Residence:                Continue home health care with:              Your wound-care supplies will be provided by: Wound cleansing:              Do not scrub or use excessive force. Wash hands with soap and water before and after dressing changes. Prior to applying a clean dressing, cleanse wound with normal saline, wound cleanser, or mild soap and water. Ask the physician or nurse before getting the wound(s) wet in a shower     Daily Wound management:              Keep weight off wounds and reposition every 2 hours. Avoid standing for long periods of time. Apply wraps/stockings in AM and remove at bedtime. If swelling is present, elevate legs to the level of the heart or above for 30 minutes 4-5 times a day and/or when sitting. When taking antibiotics take entire prescription as ordered by physician do not stop taking until medicine is all gone. Wound Care Notes:   Possible cast next week 11/2/22  [facility] in [facility] for this week: 11/23/22     Right Great Toe - Wash with soap and water, pat dry. Apply Nya to wound bed. Cover with Ioplex and ca alginate. Wrap with Conform and secure with tape. Change daily. Give forefoot  today in clinic 11/23/22 if client doesn't already have one. Follow up with Verónica Gomez CNP in 1 week in the wound care center.   Call [phone] for any questions or concerns.         Treatment Note      Written Patient Dismissal Instructions Given            Electronically signed by CARMEN Noble CNP on 11/23/2022 at 2:22 PM

## 2022-11-30 ENCOUNTER — HOSPITAL ENCOUNTER (OUTPATIENT)
Dept: WOUND CARE | Age: 27
Discharge: HOME | End: 2022-11-30
Payer: COMMERCIAL

## 2022-11-30 VITALS
HEART RATE: 109 BPM | SYSTOLIC BLOOD PRESSURE: 116 MMHG | DIASTOLIC BLOOD PRESSURE: 67 MMHG | TEMPERATURE: 97 F | RESPIRATION RATE: 18 BRPM

## 2022-11-30 DIAGNOSIS — L97.519 DIABETIC ULCER OF RIGHT GREAT TOE (HCC): ICD-10-CM

## 2022-11-30 DIAGNOSIS — L97.512 CHRONIC ULCER OF GREAT TOE OF RIGHT FOOT WITH FAT LAYER EXPOSED (HCC): Primary | ICD-10-CM

## 2022-11-30 DIAGNOSIS — E11.621 DIABETIC ULCER OF RIGHT GREAT TOE (HCC): ICD-10-CM

## 2022-11-30 PROCEDURE — 11042 DBRDMT SUBQ TIS 1ST 20SQCM/<: CPT

## 2022-11-30 PROCEDURE — 87077 CULTURE AEROBIC IDENTIFY: CPT

## 2022-11-30 PROCEDURE — 87070 CULTURE OTHR SPECIMN AEROBIC: CPT

## 2022-11-30 PROCEDURE — 87186 SC STD MICRODIL/AGAR DIL: CPT

## 2022-11-30 PROCEDURE — 87075 CULTR BACTERIA EXCEPT BLOOD: CPT

## 2022-11-30 RX ORDER — GENTAMICIN SULFATE 1 MG/G
OINTMENT TOPICAL ONCE
OUTPATIENT
Start: 2022-11-30 | End: 2022-11-30

## 2022-11-30 RX ORDER — BACITRACIN ZINC AND POLYMYXIN B SULFATE 500; 1000 [USP'U]/G; [USP'U]/G
OINTMENT TOPICAL ONCE
OUTPATIENT
Start: 2022-11-30 | End: 2022-11-30

## 2022-11-30 RX ORDER — BACITRACIN, NEOMYCIN, POLYMYXIN B 400; 3.5; 5 [USP'U]/G; MG/G; [USP'U]/G
OINTMENT TOPICAL ONCE
OUTPATIENT
Start: 2022-11-30 | End: 2022-11-30

## 2022-11-30 RX ORDER — LIDOCAINE 40 MG/G
CREAM TOPICAL ONCE
OUTPATIENT
Start: 2022-11-30 | End: 2022-11-30

## 2022-11-30 RX ORDER — CLOBETASOL PROPIONATE 0.5 MG/G
OINTMENT TOPICAL ONCE
OUTPATIENT
Start: 2022-11-30 | End: 2022-11-30

## 2022-11-30 RX ORDER — LIDOCAINE HYDROCHLORIDE 40 MG/ML
SOLUTION TOPICAL ONCE
OUTPATIENT
Start: 2022-11-30 | End: 2022-11-30

## 2022-11-30 RX ORDER — BETAMETHASONE DIPROPIONATE 0.05 %
OINTMENT (GRAM) TOPICAL ONCE
OUTPATIENT
Start: 2022-11-30 | End: 2022-11-30

## 2022-11-30 RX ORDER — LIDOCAINE HYDROCHLORIDE 20 MG/ML
JELLY TOPICAL ONCE
OUTPATIENT
Start: 2022-11-30 | End: 2022-11-30

## 2022-11-30 RX ORDER — GINSENG 100 MG
CAPSULE ORAL ONCE
OUTPATIENT
Start: 2022-11-30 | End: 2022-11-30

## 2022-11-30 RX ORDER — LIDOCAINE 50 MG/G
OINTMENT TOPICAL ONCE
OUTPATIENT
Start: 2022-11-30 | End: 2022-11-30

## 2022-11-30 ASSESSMENT — PAIN DESCRIPTION - DESCRIPTORS: DESCRIPTORS: THROBBING;SHOOTING

## 2022-11-30 ASSESSMENT — PAIN DESCRIPTION - ONSET: ONSET: ON-GOING

## 2022-11-30 ASSESSMENT — PAIN DESCRIPTION - FREQUENCY: FREQUENCY: INTERMITTENT

## 2022-11-30 ASSESSMENT — PAIN SCALES - GENERAL: PAINLEVEL_OUTOF10: 6

## 2022-11-30 ASSESSMENT — PAIN - FUNCTIONAL ASSESSMENT: PAIN_FUNCTIONAL_ASSESSMENT: PREVENTS OR INTERFERES SOME ACTIVE ACTIVITIES AND ADLS

## 2022-11-30 ASSESSMENT — PAIN DESCRIPTION - PAIN TYPE: TYPE: ACUTE PAIN

## 2022-11-30 ASSESSMENT — PAIN DESCRIPTION - ORIENTATION: ORIENTATION: RIGHT

## 2022-11-30 NOTE — DISCHARGE INSTRUCTIONS
PHYSICIAN ORDERS AND DISCHARGE INSTRUCTIONS     NOTE: Upon discharge from the [address], you will receive a patient experience survey. We would be grateful if you would take the time to fill this survey out. Wound care order history:                 MAX's   Right       Left               Date:              Cultures:  Right Great Toe cultured 11/30/22              Grafts:  Applied for grafts 11/9/22              Antibiotics:           Continuing wound care orders and information:                 Residence:                Continue home health care with:              Your wound-care supplies will be provided by: Wound cleansing:              Do not scrub or use excessive force. Wash hands with soap and water before and after dressing changes. Prior to applying a clean dressing, cleanse wound with normal saline, wound cleanser, or mild soap and water. Ask the physician or nurse before getting the wound(s) wet in a shower     Daily Wound management:              Keep weight off wounds and reposition every 2 hours. Avoid standing for long periods of time. Apply wraps/stockings in AM and remove at bedtime. If swelling is present, elevate legs to the level of the heart or above for 30 minutes 4-5 times a day and/or when sitting. When taking antibiotics take entire prescription as ordered by physician do not stop taking until medicine is all gone. Wound Care Notes:   Possible cast next week 11/2/22  [facility] in [address] for this week: 11/30/22     Right Great Toe cultured 11/30/22    Right Great Toe - Wash with soap and water, pat dry. Betadine damp gauze to wound bed. Cover with dry gauze or small piece of ABD. Wrap with Conform and secure with tape. Change daily.        Follow up with Verónica Gomez CNP in 1 week in the wound care center. Call [phone] for any questions or concerns.

## 2022-11-30 NOTE — PROGRESS NOTES
reviewed. No pertinent surgical history. FAMILY HISTORY    Family History   Problem Relation Age of Onset    Diabetes Father     Heart Disease Father     Depression Maternal Grandmother     Depression Maternal Grandfather     Depression Paternal Grandmother     Depression Paternal Grandfather        SOCIAL HISTORY    Social History     Tobacco Use    Smoking status: Former     Types: Cigarettes     Quit date: 2013     Years since quittin.9    Smokeless tobacco: Never    Tobacco comments:     EDUCATED DM II SLOWS WOUND HEALING   Substance Use Topics    Alcohol use: No    Drug use: No       ALLERGIES    No Known Allergies    MEDICATIONS    Current Outpatient Medications on File Prior to Encounter   Medication Sig Dispense Refill    busPIRone (BUSPAR) 5 MG tablet Take 5 mg by mouth 3 times daily as needed      lisinopril (PRINIVIL;ZESTRIL) 5 MG tablet Take 5 mg by mouth daily      metoclopramide (REGLAN) 10 MG tablet Take 10 mg by mouth 3 times daily      Dulaglutide (TRULICITY) 4.5 NS/2.1RA SOPN Inject 4.5 mg into the skin once a week      Cholecalciferol (VITAMIN D3) 50 MCG (2000 UT) CAPS Take 1 capsule by mouth daily      budesonide-formoterol (SYMBICORT) 80-4.5 MCG/ACT AERO Inhale 2 puffs into the lungs 2 times daily      atorvastatin (LIPITOR) 10 MG tablet Take 10 mg by mouth daily      Norgestim-Eth Estrad Triphasic (TRI-ESTARYLLA PO) Take 1 tablet by mouth daily      Empagliflozin-metFORMIN HCl ER (SYNJARDY XR) 12.5-1000 MG TB24 Take 1 tablet by mouth 2 times daily      vitamin C (ASCORBIC ACID) 500 MG tablet Take 1,000 mg by mouth daily      zinc gluconate 50 MG tablet Take 50 mg by mouth daily      naproxen (NAPROSYN) 500 MG tablet Take 1 tablet by mouth 2 times daily as needed for Pain 20 tablet 0     No current facility-administered medications on file prior to encounter. REVIEW OF SYSTEMS    Pertinent items are noted in HPI.     Constitutional: Negative for systemic symptoms including fever, chills and malaise. Objective:      /67   Pulse (!) 109   Temp 97 °F (36.1 °C) (Temporal)   Resp 18     PHYSICAL EXAM      General: The patient is in no acute distress. Mental status:  Patient is appropriate, is  oriented to place and plan of care. Dermatologic exam: Visual inspection of the periwound reveals the skin to be normal in turgor and texture, dry, and coarse  Wound exam: see wound description below in procedure note      Assessment:     Problem List Items Addressed This Visit          Endocrine    WD-Diabetic ulcer of right great toe [facility])    Relevant Orders    Initiate Outpatient Wound Care Protocol       Other    WD-Chronic ulcer of great toe of right foot with fat layer exposed ([address] - Primary    Relevant Orders    Initiate Outpatient Wound Care Protocol     Procedure Note    Indications:  Based on my examination of this patient's wound(s) today, sharp excision into necrotic subcutaneous tissue is required to promote healing and evaluate the extent of previous healing. Performed by: CARMEN Kramer CNP    Consent obtained: Yes    Time out taken:  Yes    Pain Control: 4% Lidocaine Liquid Topical        Debridement:Excisional Debridement    Using scissors and forceps the wound(s) was/were sharply debrided down through and including the removal of subcutaneous tissue.         Devitalized Tissue Debrided:  fibrin, biofilm, slough, exudate, and callus    Pre Debridement Measurements:  Are located in the Wound Documentation Flow Sheet    All active wounds listed below with today's date are evaluated  Wound(s)    debrided this date include # : 1     Post  Debridement Measurements:  Wound 07/20/22 Toe (Comment  which one) Anterior;Right #1 GR (Active)   Wound Image   11/23/22 1405   Wound Etiology Diabetic 11/30/22 1325   Dressing Status New dressing applied 11/23/22 1425   Wound Cleansed Wound cleanser;Irrigated with saline 11/30/22 1325   Offloading for Diabetic Foot Ulcers Forefoot offloading shoe 11/30/22 1325   Wound Length (cm) 1.6 cm 11/30/22 1325   Wound Width (cm) 1 cm 11/30/22 1325   Wound Depth (cm) 0.2 cm 11/30/22 1325   Wound Surface Area (cm^2) 1.6 cm^2 11/30/22 1325   Change in Wound Size % (l*w) -60 11/30/22 1325   Wound Volume (cm^3) 0.32 cm^3 11/30/22 1325   Wound Healing % -220 11/30/22 1325   Post-Procedure Length (cm) 1.6 cm 11/30/22 1343   Post-Procedure Width (cm) 1 cm 11/30/22 1343   Post-Procedure Depth (cm) 0.2 cm 11/30/22 1343   Post-Procedure Surface Area (cm^2) 1.6 cm^2 11/30/22 1343   Post-Procedure Volume (cm^3) 0.32 cm^3 11/30/22 1343   Distance Tunneling (cm) 0 cm 11/30/22 1325   Tunneling Position ___ O'Clock 0 11/30/22 1325   Undermining Starts ___ O'Clock 0 11/30/22 1325   Undermining Ends___ O'Clock 0 11/30/22 1325   Undermining Maxium Distance (cm) 0 11/30/22 1325   Wound Assessment Pink/red;Devitalized tissue 11/30/22 1325   Drainage Amount Small 11/30/22 1325   Drainage Description Serosanguinous; Yellow 11/30/22 1325   Odor None 11/30/22 1325   Annika-wound Assessment Hyperkeratosis (callous) 11/30/22 1325   Margins Unattached edges 11/30/22 1325   Wound Thickness Description not for Pressure Injury Full thickness 11/30/22 1325   Number of days: 133       Percent of Wound(s) Debrided: approximately 100%    Total  Area  Debrided:  1.6 sq cm     Bleeding:  Minimal    Hemostasis Achieved:  by pressure    Procedural Pain:  0  / 10     Post Procedural Pain:  0 / 10     Response to treatment:  Well tolerated by patient. Status of wound progress and description from last visit:   Worse     Culture drawn and will review when resulted   Will plan to apply TCC after culture returns. Follow up 1 week       Plan:       Discharge Instructions         PHYSICIAN ORDERS AND DISCHARGE INSTRUCTIONS     NOTE: Upon discharge from the [address], you will receive a patient experience survey.  We would be grateful if you would take the time to fill this survey out.     Wound care order history:                 MAX's   Right       Left               Date:              Cultures:  Right Great Toe cultured 11/30/22              Grafts:  Applied for grafts 11/9/22              Antibiotics:           Continuing wound care orders and information:                 Residence:                Continue home health care with:              Your wound-care supplies will be provided by: Wound cleansing:              Do not scrub or use excessive force. Wash hands with soap and water before and after dressing changes. Prior to applying a clean dressing, cleanse wound with normal saline, wound cleanser, or mild soap and water. Ask the physician or nurse before getting the wound(s) wet in a shower     Daily Wound management:              Keep weight off wounds and reposition every 2 hours. Avoid standing for long periods of time. Apply wraps/stockings in AM and remove at bedtime. If swelling is present, elevate legs to the level of the heart or above for 30 minutes 4-5 times a day and/or when sitting. When taking antibiotics take entire prescription as ordered by physician do not stop taking until medicine is all gone. Wound Care Notes:   Possible cast next week 11/2/22  [facility] in [facility] for this week: 11/30/22     Right Great Toe cultured 11/30/22    Right Great Toe - Wash with soap and water, pat dry. Betadine damp gauze to wound bed. Cover with dry gauze or small piece of ABD. Wrap with Conform and secure with tape. Change daily. Follow up with Carmelo Duarte CNP in 1 week in the wound care center. Call [phone] for any questions or concerns.         Treatment Note      Written Patient Dismissal Instructions Given            Electronically signed by CARMEN Fraire CNP on 11/30/2022 at 1:47 PM

## 2022-12-03 LAB
CULTURE: ABNORMAL
CULTURE: ABNORMAL
Lab: ABNORMAL
SPECIMEN: ABNORMAL

## 2022-12-07 ENCOUNTER — HOSPITAL ENCOUNTER (OUTPATIENT)
Dept: WOUND CARE | Age: 27
Discharge: HOME | End: 2022-12-07
Payer: COMMERCIAL

## 2022-12-07 VITALS
SYSTOLIC BLOOD PRESSURE: 119 MMHG | HEART RATE: 111 BPM | RESPIRATION RATE: 18 BRPM | DIASTOLIC BLOOD PRESSURE: 73 MMHG | TEMPERATURE: 97.5 F

## 2022-12-07 DIAGNOSIS — L97.512 CHRONIC ULCER OF GREAT TOE OF RIGHT FOOT WITH FAT LAYER EXPOSED (HCC): ICD-10-CM

## 2022-12-07 DIAGNOSIS — L97.519 DIABETIC ULCER OF RIGHT GREAT TOE (HCC): Primary | ICD-10-CM

## 2022-12-07 DIAGNOSIS — E11.621 DIABETIC ULCER OF RIGHT GREAT TOE (HCC): Primary | ICD-10-CM

## 2022-12-07 PROCEDURE — 6370000000 HC RX 637 (ALT 250 FOR IP): Performed by: NURSE PRACTITIONER

## 2022-12-07 PROCEDURE — 11042 DBRDMT SUBQ TIS 1ST 20SQCM/<: CPT

## 2022-12-07 RX ORDER — LIDOCAINE 50 MG/G
OINTMENT TOPICAL ONCE
OUTPATIENT
Start: 2022-12-07 | End: 2022-12-07

## 2022-12-07 RX ORDER — GENTAMICIN SULFATE 1 MG/G
OINTMENT TOPICAL ONCE
Status: COMPLETED | OUTPATIENT
Start: 2022-12-07 | End: 2022-12-07

## 2022-12-07 RX ORDER — LIDOCAINE HYDROCHLORIDE 20 MG/ML
JELLY TOPICAL ONCE
OUTPATIENT
Start: 2022-12-07 | End: 2022-12-07

## 2022-12-07 RX ORDER — BACITRACIN, NEOMYCIN, POLYMYXIN B 400; 3.5; 5 [USP'U]/G; MG/G; [USP'U]/G
OINTMENT TOPICAL ONCE
OUTPATIENT
Start: 2022-12-07 | End: 2022-12-07

## 2022-12-07 RX ORDER — LIDOCAINE HYDROCHLORIDE 40 MG/ML
SOLUTION TOPICAL ONCE
OUTPATIENT
Start: 2022-12-07 | End: 2022-12-07

## 2022-12-07 RX ORDER — GINSENG 100 MG
CAPSULE ORAL ONCE
OUTPATIENT
Start: 2022-12-07 | End: 2022-12-07

## 2022-12-07 RX ORDER — BETAMETHASONE DIPROPIONATE 0.05 %
OINTMENT (GRAM) TOPICAL ONCE
OUTPATIENT
Start: 2022-12-07 | End: 2022-12-07

## 2022-12-07 RX ORDER — CLOBETASOL PROPIONATE 0.5 MG/G
OINTMENT TOPICAL ONCE
OUTPATIENT
Start: 2022-12-07 | End: 2022-12-07

## 2022-12-07 RX ORDER — LIDOCAINE 40 MG/G
CREAM TOPICAL ONCE
OUTPATIENT
Start: 2022-12-07 | End: 2022-12-07

## 2022-12-07 RX ORDER — GENTAMICIN SULFATE 1 MG/G
OINTMENT TOPICAL ONCE
OUTPATIENT
Start: 2022-12-07 | End: 2022-12-07

## 2022-12-07 RX ORDER — BACITRACIN ZINC AND POLYMYXIN B SULFATE 500; 1000 [USP'U]/G; [USP'U]/G
OINTMENT TOPICAL ONCE
OUTPATIENT
Start: 2022-12-07 | End: 2022-12-07

## 2022-12-07 RX ADMIN — GENTAMICIN SULFATE: 1 OINTMENT TOPICAL at 14:40

## 2022-12-07 ASSESSMENT — PAIN DESCRIPTION - FREQUENCY: FREQUENCY: INTERMITTENT

## 2022-12-07 ASSESSMENT — PAIN DESCRIPTION - DESCRIPTORS: DESCRIPTORS: ACHING;SHOOTING;STABBING;THROBBING

## 2022-12-07 ASSESSMENT — PAIN SCALES - GENERAL: PAINLEVEL_OUTOF10: 7

## 2022-12-07 ASSESSMENT — PAIN DESCRIPTION - PAIN TYPE: TYPE: ACUTE PAIN

## 2022-12-07 ASSESSMENT — PAIN DESCRIPTION - ONSET: ONSET: ON-GOING

## 2022-12-07 ASSESSMENT — PAIN - FUNCTIONAL ASSESSMENT: PAIN_FUNCTIONAL_ASSESSMENT: PREVENTS OR INTERFERES SOME ACTIVE ACTIVITIES AND ADLS

## 2022-12-07 ASSESSMENT — PAIN DESCRIPTION - ORIENTATION: ORIENTATION: RIGHT

## 2022-12-07 ASSESSMENT — PAIN DESCRIPTION - LOCATION: LOCATION: TOE (COMMENT WHICH ONE)

## 2022-12-07 NOTE — DISCHARGE INSTRUCTIONS
PHYSICIAN ORDERS AND DISCHARGE INSTRUCTIONS     NOTE: Upon discharge from the [address], you will receive a patient experience survey. We would be grateful if you would take the time to fill this survey out. Wound care order history:                 MAX's   Right       Left               Date:              Cultures:  Right Great Toe cultured 11/30/22              Grafts:  Applied for grafts 11/9/22              Antibiotics:           Continuing wound care orders and information:                 Residence:                Continue home health care with:              Your wound-care supplies will be provided by: Wound cleansing:              Do not scrub or use excessive force. Wash hands with soap and water before and after dressing changes. Prior to applying a clean dressing, cleanse wound with normal saline, wound cleanser, or mild soap and water. Ask the physician or nurse before getting the wound(s) wet in a shower     Daily Wound management:              Keep weight off wounds and reposition every 2 hours. Avoid standing for long periods of time. Apply wraps/stockings in AM and remove at bedtime. If swelling is present, elevate legs to the level of the heart or above for 30 minutes 4-5 times a day and/or when sitting. When taking antibiotics take entire prescription as ordered by physician do not stop taking until medicine is all gone. Wound Care Notes:   Possible cast next week 11/2/22  [facility] [facility] [address] for this week: 12/7/22     Right Great Toe cultured 11/30/22     Right Great Toe - Wash with soap and water, pat dry. Apply Gentamicin and Stimulen to wound bed. Cover with small piece of ABD. Wrap with Conform and secure with tape. Change daily.         Follow up with Robson Don CNP in 1 week in the wound care center. Call [phone] for any questions or concerns.

## 2022-12-11 NOTE — PROGRESS NOTES
Wound Care Center Progress Note With Procedure    Wanda Atkins  AGE: 32 y.o. GENDER: female  : 1995  EPISODE DATE:  2022     Subjective:     Chief Complaint   Patient presents with    Wound Check     Right Great Toe         HISTORY of PRESENT ILLNESS     Wanda Atkins is a 32 y.o. female who presents to the [address] [place] [address] for a visit for evaluation and treatment of Chronic diabetic  ulcer(s) of  Rt. foot hallux. The condition is of moderate severity. The ulcer has been present since 2022. The underlying cause is thought to be diabetes. The patients care to date has included regular wound care in [place] . The patient has significant underlying medical conditions as below. Wound Pain Timing/Severity: none  Quality of pain: N/A  Severity of pain:  0 / 10  Modifying Factors: diabetes  Associated Signs/Symptoms: erythema, drainage, and numbness    Diabetes: oral and SQ Trulicity regimen, last A1c 8.2 as of 22. Diabetes education provided:  Diabetes pathoetiology, difference between type 1 and type 2 diabetes, and progressive nature of Type 2 DM. Metabolic syndrome: association of diabetes with dyslipidemia, HTN and obesity. Foot care: advised to wash feet daily, pat dry and apply lotion at night, avoiding between toes. Need to look at feet daily and report to a physician any signs of inflammation or skin damage. Discussed diabetes shoes and socks. Diabetic management related to wound healing    Smoking: Former smoker  Anticoagulant/Antiplatelet therapy: No  Immunosuppression: No  Obesity: Yes    Patient educated on the 6 essential components necessary for wound healing: Circulation, Debridements, Proper Dressings and Topical Wound Products, Infection Control, Edema Control and Offloading.      Patient educated on those factors that negatively effect or impact wound healing: smoking, obesity, uncontrolled diabetes, anticoagulant and immunosuppressive regimens, inadequate nutrition, untreated arterial and venous disease if applicable and measures to manage edema. Nutritional status: well nourished. Discussed need for increased protein and calories for wound healing and good sources of protein (just over 7 grams for every 20 pounds of body weight). Animal-based foods high in protein (meat, poultry, fish, eggs, and dairy foods). Plant based foods high in protein (tofu, lentils, beans, chickpeas, nuts, quinoa and nesha seeds. Off Loading  Offloading or minimizing or removing weight placed on an area with poor circulation such as diabetic wounds or pressure. This can be achieved with crutches, wheel chair, knee walker etc. Minimizing pressure through partial weight bearing (minimizing the amount of  pressure applied and or the amount of time on the area of pressure) or maintaining a non-weight bearing status can be used to promote and often can be essential for thee wound to heal. Off loading may also need to be achieved for non-weight bearing wounds such as pressure ulcers to the torso. Turning and changing positions frequently, at least every two hours. Use of pressure cushion if sitting up in chair. Skin Care  Keep skin clean and well moisturized , moisturize routinely with ointments for heavier moisturizer needs for extremely dry skin or cracks such as A&D ointment and lotions for a light moisturizer such as CeraVe or Eucerin. If incontinent change incontinence garments as soon as soiled and keeping skin clean and use barrier cream to protect the skin. Reduce Salt and Sodium  Choose low- or reduced- sodium, or no-salt-added versions of foods and condiments when available. Buy fresh, plain frozen, or canned with no-added-salt vegetables. Use fresh poultry, fish and lean meat, rather than canned, smoked or processed types. Choose ready-to-eat breakfast cereals that are lower in sodium.  Limit cured foods (such as alcaraz and ham), foods packed in brine (such as pickled foods) and condiments (such as MSG, mustard, horseradish, and catsup). Limit even lower sodium versions of soy sauce and teriyaki sauce-treat these condiments just like salt). In cooking and at the table, flavor foods with herbs, spices, lemon, lime, vinegar or salt-free seasoning blends. Start by cutting salt in half. Cook rice, pasta and hot cereals without salt. Cut back on instant or flavored rice, pasta and cereal mixes, which usually have added salt. Choose convenience foods that are lower in sodium. Limit frozen dinners, packaged mixes, canned soups and dressings. Rinse canned foods, such as tuna, to remove some sodium. Choose fruits or vegetables instead of salty snack foods. Edema Management   Whenever resting, raise your legs up. Try to keep the swollen area higher than the level of your heart. Take breaks from standing or sitting in one position. Walk around to increase the blood flow in your lower legs. Move your feet and ankles often while you stand, or tighten and relax your leg muscles. Wear support stockings. Put them on in the morning, before swelling gets worse. Eat a balanced diet. Lose weight if you need to. Limit the amount of salt (sodium) in your diet. Salt holds fluid in the body and may increase swelling. Apply compression stocking(s) every morning as soon as you get up. Remove at bedtime unless instructed to wear day and night. Hand wash and line dry to prevent loss of elasticity. Replace every 3-4 months to ensure proper fit. Weight Management   Will need to ultimately change overall eating behaviors to have success with weight loss. Encouraged to weigh daily and work towards a goal of 1-2 pounds of weight loss weekly. Encouraged to journal all food intake, [employer] pal is a useful tool to help keep track of food intake and caloric value. Keep calorie level at approximately 6332-0451. Protein intake is to be a minimum of 60 grams per day (unless otherwise directed). Water drinking was encouraged with a goal of 64oz-128oz daily. Beverages to be calorie free except for milk. Every other beverage should be water, avoid soda. Continue to increase level of physical activity. Refer to weight management as indicated and requested by patient. PAST MEDICAL HISTORY        Diagnosis Date    Diabetes mellitus ([address]        PAST SURGICAL HISTORY    History reviewed. No pertinent surgical history.     FAMILY HISTORY    Family History   Problem Relation Age of Onset    Diabetes Father     Heart Disease Father     Depression Maternal Grandmother     Depression Maternal Grandfather     Depression Paternal Grandmother     Depression Paternal Grandfather        SOCIAL HISTORY    Social History     Tobacco Use    Smoking status: Former     Types: Cigarettes     Quit date: 2013     Years since quittin.9    Smokeless tobacco: Never    Tobacco comments:     EDUCATED DM II SLOWS WOUND HEALING   Substance Use Topics    Alcohol use: No    Drug use: No       ALLERGIES    No Known Allergies    MEDICATIONS    Current Outpatient Medications on File Prior to Encounter   Medication Sig Dispense Refill    busPIRone (BUSPAR) 5 MG tablet Take 5 mg by mouth 3 times daily as needed      lisinopril (PRINIVIL;ZESTRIL) 5 MG tablet Take 5 mg by mouth daily      metoclopramide (REGLAN) 10 MG tablet Take 10 mg by mouth 3 times daily      Dulaglutide (TRULICITY) 4.5 EG/0.6ZW SOPN Inject 4.5 mg into the skin once a week      Cholecalciferol (VITAMIN D3) 50 MCG ( UT) CAPS Take 1 capsule by mouth daily      budesonide-formoterol (SYMBICORT) 80-4.5 MCG/ACT AERO Inhale 2 puffs into the lungs 2 times daily      atorvastatin (LIPITOR) 10 MG tablet Take 10 mg by mouth daily      Norgestim-Eth Estrad Triphasic (TRI-ESTARYLLA PO) Take 1 tablet by mouth daily      Empagliflozin-metFORMIN HCl ER (SYNJARDY XR) 12.5-1000 MG TB24 Take 1 tablet by mouth 2 times daily      vitamin C (ASCORBIC ACID) 500 MG tablet Take 1,000 mg by mouth daily      zinc gluconate 50 MG tablet Take 50 mg by mouth daily      naproxen (NAPROSYN) 500 MG tablet Take 1 tablet by mouth 2 times daily as needed for Pain 20 tablet 0     No current facility-administered medications on file prior to encounter. REVIEW OF SYSTEMS    Pertinent items are noted in HPI. Constitutional: Negative for systemic symptoms including fever, chills and malaise. Objective:      /73   Pulse (!) 111   Temp 97.5 °F (36.4 °C) (Temporal)   Resp 18     PHYSICAL EXAM      General: The patient is in no acute distress. Mental status:  Patient is appropriate, is  oriented to place and plan of care. Dermatologic exam: Visual inspection of the periwound reveals the skin to be normal in turgor and texture, dry, and coarse  Wound exam: see wound description below in procedure note      Assessment:     Problem List Items Addressed This Visit          Endocrine    WD-Diabetic ulcer of right great toe ([address] - Primary       Other    WD-Chronic ulcer of great toe of right foot with fat layer exposed ([address]     Procedure Note    Indications:  Based on my examination of this patient's wound(s) today, sharp excision into necrotic subcutaneous tissue is required to promote healing and evaluate the extent of previous healing. Performed by: CARMEN Block - CNP    Consent obtained: Yes    Time out taken:  Yes    Pain Control: 4% Lidocaine Liquid Topical        Debridement:Excisional Debridement    Using scissors and forceps the wound(s) was/were sharply debrided down through and including the removal of subcutaneous tissue.         Devitalized Tissue Debrided:  fibrin, biofilm, slough, exudate, and callus    Pre Debridement Measurements:  Are located in the Wound Documentation Flow Sheet    All active wounds listed below with today's date are evaluated  Wound(s)    debrided this date include # : 1     Post  Debridement Measurements:  Wound 07/20/22 Toe (Comment which one) Anterior;Right #1 GR (Active)   Wound Image   12/07/22 1352   Wound Etiology Diabetic 12/07/22 1439   Dressing Status New dressing applied 12/07/22 1439   Wound Cleansed Wound cleanser 12/07/22 1352   Offloading for Diabetic Foot Ulcers Forefoot offloading shoe 12/07/22 1439   Wound Length (cm) 0.6 cm 12/07/22 1352   Wound Width (cm) 0.3 cm 12/07/22 1352   Wound Depth (cm) 0.1 cm 12/07/22 1352   Wound Surface Area (cm^2) 0.18 cm^2 12/07/22 1352   Change in Wound Size % (l*w) 82 12/07/22 1352   Wound Volume (cm^3) 0.018 cm^3 12/07/22 1352   Wound Healing % 82 12/07/22 1352   Post-Procedure Length (cm) 0.6 cm 12/07/22 1418   Post-Procedure Width (cm) 0.3 cm 12/07/22 1418   Post-Procedure Depth (cm) 0.1 cm 12/07/22 1418   Post-Procedure Surface Area (cm^2) 0.18 cm^2 12/07/22 1418   Post-Procedure Volume (cm^3) 0.018 cm^3 12/07/22 1418   Distance Tunneling (cm) 0 cm 12/07/22 1352   Tunneling Position ___ O'Clock 0 12/07/22 1352   Undermining Starts ___ O'Clock 0 12/07/22 1352   Undermining Ends___ O'Clock 0 12/07/22 1352   Undermining Maxium Distance (cm) 0 12/07/22 1352   Wound Assessment Pink/red;Devitalized tissue 12/07/22 1352   Drainage Amount Scant 12/07/22 1352   Drainage Description Serosanguinous; Yellow 12/07/22 1352   Odor None 12/07/22 1352   Annika-wound Assessment Hyperkeratosis (callous) 12/07/22 1352   Margins Attached edges 12/07/22 1352   Wound Thickness Description not for Pressure Injury Full thickness 12/07/22 1352   Number of days: 144     Percent of Wound(s) Debrided: approximately 100%    Total  Area  Debrided: 0.18 sq cm     Bleeding:  Minimal    Hemostasis Achieved:  by pressure    Procedural Pain:  0  / 10     Post Procedural Pain:  0 / 10     Response to treatment:  Well tolerated by patient. Status of wound progress and description from last visit:  Stable, regimen as below, follow up in one week.     Plan:       Discharge Instructions         PHYSICIAN ORDERS AND DISCHARGE INSTRUCTIONS     NOTE: Upon discharge from the [address], you will receive a patient experience survey. We would be grateful if you would take the time to fill this survey out. Wound care order history:                 MAX's   Right       Left               Date:              Cultures:  Right Great Toe cultured 11/30/22              Grafts:  Applied for grafts 11/9/22              Antibiotics:           Continuing wound care orders and information:                 Residence:                Continue home health care with:              Your wound-care supplies will be provided by: Wound cleansing:              Do not scrub or use excessive force. Wash hands with soap and water before and after dressing changes. Prior to applying a clean dressing, cleanse wound with normal saline, wound cleanser, or mild soap and water. Ask the physician or nurse before getting the wound(s) wet in a shower     Daily Wound management:              Keep weight off wounds and reposition every 2 hours. Avoid standing for long periods of time. Apply wraps/stockings in AM and remove at bedtime. If swelling is present, elevate legs to the level of the heart or above for 30 minutes 4-5 times a day and/or when sitting. When taking antibiotics take entire prescription as ordered by physician do not stop taking until medicine is all gone. Wound Care Notes:   Possible cast next week 11/2/22  [facility] for this week: 12/7/22     Right Great Toe cultured 11/30/22     Right Great Toe - Wash with soap and water, pat dry. Apply Gentamicin and Stimulen to wound bed. Cover with small piece of ABD. Wrap with Conform and secure with tape. Change daily. Follow up with Brandon NurseJESICA in 1 week in the wound care center.   Call [phone] for any questions or concerns.         Treatment Note Wound 07/20/22 Toe (Comment  which one) Anterior;Right #1 GR-Dressing/Treatment:  (gent stimulen,abd,conform,tape)    Written Patient Dismissal Instructions Given            Electronically signed by CARMEN Leon CNP on 12/11/2022 at 4:22 PM

## 2022-12-14 ENCOUNTER — HOSPITAL ENCOUNTER (OUTPATIENT)
Dept: WOUND CARE | Age: 27
Discharge: HOME | End: 2022-12-14
Payer: COMMERCIAL

## 2022-12-14 VITALS
SYSTOLIC BLOOD PRESSURE: 131 MMHG | DIASTOLIC BLOOD PRESSURE: 81 MMHG | HEART RATE: 106 BPM | TEMPERATURE: 97.3 F | RESPIRATION RATE: 18 BRPM

## 2022-12-14 DIAGNOSIS — E11.621 DIABETIC ULCER OF RIGHT GREAT TOE (HCC): Primary | ICD-10-CM

## 2022-12-14 DIAGNOSIS — L97.519 DIABETIC ULCER OF RIGHT GREAT TOE (HCC): Primary | ICD-10-CM

## 2022-12-14 DIAGNOSIS — L97.512 CHRONIC ULCER OF GREAT TOE OF RIGHT FOOT WITH FAT LAYER EXPOSED (HCC): ICD-10-CM

## 2022-12-14 PROCEDURE — 6370000000 HC RX 637 (ALT 250 FOR IP): Performed by: NURSE PRACTITIONER

## 2022-12-14 PROCEDURE — 99213 OFFICE O/P EST LOW 20 MIN: CPT

## 2022-12-14 PROCEDURE — 99213 OFFICE O/P EST LOW 20 MIN: CPT | Performed by: NURSE PRACTITIONER

## 2022-12-14 RX ORDER — BACITRACIN, NEOMYCIN, POLYMYXIN B 400; 3.5; 5 [USP'U]/G; MG/G; [USP'U]/G
OINTMENT TOPICAL ONCE
OUTPATIENT
Start: 2022-12-14 | End: 2022-12-14

## 2022-12-14 RX ORDER — LIDOCAINE 50 MG/G
OINTMENT TOPICAL ONCE
OUTPATIENT
Start: 2022-12-14 | End: 2022-12-14

## 2022-12-14 RX ORDER — GENTAMICIN SULFATE 1 MG/G
OINTMENT TOPICAL ONCE
OUTPATIENT
Start: 2022-12-14 | End: 2022-12-14

## 2022-12-14 RX ORDER — LIDOCAINE HYDROCHLORIDE 20 MG/ML
JELLY TOPICAL ONCE
OUTPATIENT
Start: 2022-12-14 | End: 2022-12-14

## 2022-12-14 RX ORDER — BETAMETHASONE DIPROPIONATE 0.05 %
OINTMENT (GRAM) TOPICAL ONCE
OUTPATIENT
Start: 2022-12-14 | End: 2022-12-14

## 2022-12-14 RX ORDER — LIDOCAINE 40 MG/G
CREAM TOPICAL ONCE
OUTPATIENT
Start: 2022-12-14 | End: 2022-12-14

## 2022-12-14 RX ORDER — GINSENG 100 MG
CAPSULE ORAL ONCE
OUTPATIENT
Start: 2022-12-14 | End: 2022-12-14

## 2022-12-14 RX ORDER — LIDOCAINE 50 MG/G
OINTMENT TOPICAL ONCE
Status: COMPLETED | OUTPATIENT
Start: 2022-12-14 | End: 2022-12-14

## 2022-12-14 RX ORDER — LIDOCAINE HYDROCHLORIDE 40 MG/ML
SOLUTION TOPICAL ONCE
OUTPATIENT
Start: 2022-12-14 | End: 2022-12-14

## 2022-12-14 RX ORDER — BACITRACIN ZINC AND POLYMYXIN B SULFATE 500; 1000 [USP'U]/G; [USP'U]/G
OINTMENT TOPICAL ONCE
OUTPATIENT
Start: 2022-12-14 | End: 2022-12-14

## 2022-12-14 RX ORDER — CLOBETASOL PROPIONATE 0.5 MG/G
OINTMENT TOPICAL ONCE
OUTPATIENT
Start: 2022-12-14 | End: 2022-12-14

## 2022-12-14 RX ORDER — GENTAMICIN SULFATE 1 MG/G
OINTMENT TOPICAL ONCE
Status: COMPLETED | OUTPATIENT
Start: 2022-12-14 | End: 2022-12-14

## 2022-12-14 RX ADMIN — LIDOCAINE: 50 OINTMENT TOPICAL at 13:56

## 2022-12-14 RX ADMIN — GENTAMICIN SULFATE: 1 OINTMENT TOPICAL at 14:41

## 2022-12-14 ASSESSMENT — PAIN DESCRIPTION - ONSET: ONSET: ON-GOING

## 2022-12-14 ASSESSMENT — PAIN SCALES - GENERAL: PAINLEVEL_OUTOF10: 3

## 2022-12-14 ASSESSMENT — PAIN DESCRIPTION - DESCRIPTORS: DESCRIPTORS: STABBING

## 2022-12-14 ASSESSMENT — PAIN DESCRIPTION - LOCATION: LOCATION: TOE (COMMENT WHICH ONE)

## 2022-12-14 ASSESSMENT — PAIN DESCRIPTION - PAIN TYPE: TYPE: ACUTE PAIN

## 2022-12-14 ASSESSMENT — PAIN DESCRIPTION - FREQUENCY: FREQUENCY: INTERMITTENT

## 2022-12-14 ASSESSMENT — PAIN DESCRIPTION - ORIENTATION: ORIENTATION: RIGHT

## 2022-12-14 ASSESSMENT — PAIN - FUNCTIONAL ASSESSMENT: PAIN_FUNCTIONAL_ASSESSMENT: PREVENTS OR INTERFERES SOME ACTIVE ACTIVITIES AND ADLS

## 2022-12-14 NOTE — PROGRESS NOTES
Wound Care Center Progress Note       Shivani Graham  AGE: 32 y.o. GENDER: female  : 1995  TODAY'S DATE:  2022        Subjective:     Chief Complaint   Patient presents with    Wound Check     Right great toe         HISTORY of PRESENT ILLNESS    Shivani Graham is a 32 y.o. female who presents to the [address] [place] [address] for a visit for evaluation and treatment of Chronic diabetic  ulcer(s) of  Rt. foot hallux. The condition is of moderate severity. The ulcer has been present since 2022. The underlying cause is thought to be diabetes. The patients care to date has included regular wound care in [place] . The patient has significant underlying medical conditions as below. Patient wound is closed today. Very thin new skin will need to be babied. Foot care: advised to wash feet daily, pat dry and apply lotion at night, avoiding between toes. Need to look at feet daily and report to a physician any signs of inflammation or skin damage. Discussed diabetes shoes and socks. Different diabetic medications. Managing high and low sugar readings. Patient educated on the 6 essential components necessary for wound healing: Circulation, Debridements, Proper Dressings and Topical Wound Products, Infection Control, Edema Control and Offloading. Patient educated on those factors that negatively effect or impact wound healing: smoking, obesity, uncontrolled diabetes, anticoagulant and immunosuppressive regimens, inadequate nutrition, untreated arterial and venous disease if applicable and measures to manage edema. Wound Pain Timing/Severity: none  Quality of pain: N/A  Severity of pain:  0 / 10  Modifying Factors: diabetes  Associated Signs/Symptoms: erythema, drainage, and numbness        PAST MEDICAL HISTORY        Diagnosis Date    Diabetes mellitus ([address]        PAST SURGICAL HISTORY    History reviewed. No pertinent surgical history.     FAMILY HISTORY    Family History Problem Relation Age of Onset    Diabetes Father     Heart Disease Father     Depression Maternal Grandmother     Depression Maternal Grandfather     Depression Paternal Grandmother     Depression Paternal Grandfather        SOCIAL HISTORY    Social History     Tobacco Use    Smoking status: Former     Types: Cigarettes     Quit date: 2013     Years since quittin.9    Smokeless tobacco: Never    Tobacco comments:     EDUCATED DM II SLOWS WOUND HEALING   Substance Use Topics    Alcohol use: No    Drug use: No       ALLERGIES    No Known Allergies    MEDICATIONS    Current Outpatient Medications on File Prior to Encounter   Medication Sig Dispense Refill    busPIRone (BUSPAR) 5 MG tablet Take 5 mg by mouth 3 times daily as needed      lisinopril (PRINIVIL;ZESTRIL) 5 MG tablet Take 5 mg by mouth daily      metoclopramide (REGLAN) 10 MG tablet Take 10 mg by mouth 3 times daily      Dulaglutide (TRULICITY) 4.5 PM/0.8ZK SOPN Inject 4.5 mg into the skin once a week      Cholecalciferol (VITAMIN D3) 50 MCG (2000 UT) CAPS Take 1 capsule by mouth daily      budesonide-formoterol (SYMBICORT) 80-4.5 MCG/ACT AERO Inhale 2 puffs into the lungs 2 times daily      atorvastatin (LIPITOR) 10 MG tablet Take 10 mg by mouth daily      Norgestim-Eth Estrad Triphasic (TRI-ESTARYLLA PO) Take 1 tablet by mouth daily      Empagliflozin-metFORMIN HCl ER (SYNJARDY XR) 12.5-1000 MG TB24 Take 1 tablet by mouth 2 times daily      vitamin C (ASCORBIC ACID) 500 MG tablet Take 1,000 mg by mouth daily      zinc gluconate 50 MG tablet Take 50 mg by mouth daily      naproxen (NAPROSYN) 500 MG tablet Take 1 tablet by mouth 2 times daily as needed for Pain 20 tablet 0     No current facility-administered medications on file prior to encounter. REVIEW OF SYSTEMS    Pertinent items are noted in HPI. Constitutional: Negative for systemic symptoms including fever, chills and malaise.     Objective:      /81   Pulse (!) 106   Temp 97.3 °F (36.3 °C) (Temporal)   Resp 18     PHYSICAL EXAM      General: The patient is in no acute distress. Mental status:  Patient is appropriate, is  oriented to place and plan of care. Dermatologic exam: Visual inspection of the periwound reveals the skin to be normal in turgor and texture, dry, and coarse. Wound exam:  see wound description below     All active wounds listed below with today's date are evaluated      Wound 07/20/22 Toe (Comment  which one) Anterior;Right #1 GR (Active)   Wound Image   12/07/22 1352   Wound Etiology Diabetic 12/14/22 1350   Dressing Status New dressing applied 12/07/22 1439   Wound Cleansed Wound cleanser 12/14/22 1350   Offloading for Diabetic Foot Ulcers Forefoot offloading shoe 12/14/22 1350   Wound Length (cm) 0 cm 12/14/22 1350   Wound Width (cm) 0 cm 12/14/22 1350   Wound Depth (cm) 0 cm 12/14/22 1350   Wound Surface Area (cm^2) 0 cm^2 12/14/22 1350   Change in Wound Size % (l*w) 100 12/14/22 1350   Wound Volume (cm^3) 0 cm^3 12/14/22 1350   Wound Healing % 100 12/14/22 1350   Post-Procedure Length (cm) 0.6 cm 12/07/22 1418   Post-Procedure Width (cm) 0.3 cm 12/07/22 1418   Post-Procedure Depth (cm) 0.1 cm 12/07/22 1418   Post-Procedure Surface Area (cm^2) 0.18 cm^2 12/07/22 1418   Post-Procedure Volume (cm^3) 0.018 cm^3 12/07/22 1418   Distance Tunneling (cm) 0 cm 12/14/22 1350   Tunneling Position ___ O'Clock 0 12/14/22 1350   Undermining Starts ___ O'Clock 0 12/14/22 1350   Undermining Ends___ O'Clock 0 12/14/22 1350   Undermining Maxium Distance (cm) 0 12/14/22 1350   Wound Assessment Dry 12/14/22 1350   Drainage Amount None 12/14/22 1350   Drainage Description Serosanguinous; Yellow 12/07/22 1352   Odor None 12/14/22 1350   Annika-wound Assessment Hyperkeratosis (callous) 12/14/22 1350   Margins Attached edges 12/14/22 1350   Wound Thickness Description not for Pressure Injury Full thickness 12/07/22 1352   Number of days: 147       Assessment:       Problem List Items Addressed This Visit          Endocrine    WD-Diabetic ulcer of right great toe ([facility] - Primary    Relevant Medications    gentamicin (GARAMYCIN) 0.1 % ointment (Start on 12/14/2022  2:45 PM)    Other Relevant Orders    Initiate Outpatient Wound Care Protocol       Other    WD-Chronic ulcer of great toe of right foot with fat layer exposed ([facility]    Relevant Medications    gentamicin (GARAMYCIN) 0.1 % ointment (Start on 12/14/2022  2:45 PM)    Other Relevant Orders    Initiate Outpatient Wound Care Protocol       Status of wound progress and description from last visit:   Healed. Patient to care for wound as previously for another week. Will concentrate on offloading and padding    Follow up 2 weeks and hope to discharge at that time         Plan:     Discharge Instructions         [address]     NOTE: Upon discharge from the [address], you will receive a patient experience survey. We would be grateful if you would take the time to fill this survey out. Wound care order history:                 MAX's   Right       Left               Date:              Cultures:  Right Great Toe cultured 11/30/22              Grafts:  Applied for grafts 11/9/22              Antibiotics:           Continuing wound care orders and information:                 Residence:                Continue home health care with:              Your wound-care supplies will be provided by: Wound cleansing:              Do not scrub or use excessive force. Wash hands with soap and water before and after dressing changes. Prior to applying a clean dressing, cleanse wound with normal saline, wound cleanser, or mild soap and water. Ask the physician or nurse before getting the wound(s) wet in a shower     Daily Wound management:              Keep weight off wounds and reposition every 2 hours. Avoid standing for long periods of time.               Apply wraps/stockings in AM and remove at bedtime. If swelling is present, elevate legs to the level of the heart or above for 30 minutes 4-5 times a day and/or when sitting. When taking antibiotics take entire prescription as ordered by physician do not stop taking until medicine is all gone. Wound Care Notes:   Possible cast next week 11/2/22  [facility] for this week: 12/14/22     Right Great Toe cultured 11/30/22     Right Great Toe - Wash with soap and water, pat dry. Apply Gentamicin and Stimulen to wound bed. Cover with small piece of ABD. Wrap with Conform and secure with tape. Change daily. Follow up with Ramón Wilkinson CNP in 2 weeks in the wound care center. Call [phone] for any questions or concerns.         Treatment Note      Written Patient Dismissal Instructions Given            Electronically signed by CARMEN Charlton CNP on 12/14/2022 at 2:25 PM

## 2022-12-14 NOTE — DISCHARGE INSTRUCTIONS
PHYSICIAN ORDERS AND DISCHARGE INSTRUCTIONS     NOTE: Upon discharge from the [address], you will receive a patient experience survey. We would be grateful if you would take the time to fill this survey out. Wound care order history:                 MAX's   Right       Left               Date:              Cultures:  Right Great Toe cultured 11/30/22              Grafts:  Applied for grafts 11/9/22              Antibiotics:           Continuing wound care orders and information:                 Residence:                Continue home health care with:              Your wound-care supplies will be provided by: Wound cleansing:              Do not scrub or use excessive force. Wash hands with soap and water before and after dressing changes. Prior to applying a clean dressing, cleanse wound with normal saline, wound cleanser, or mild soap and water. Ask the physician or nurse before getting the wound(s) wet in a shower     Daily Wound management:              Keep weight off wounds and reposition every 2 hours. Avoid standing for long periods of time. Apply wraps/stockings in AM and remove at bedtime. If swelling is present, elevate legs to the level of the heart or above for 30 minutes 4-5 times a day and/or when sitting. When taking antibiotics take entire prescription as ordered by physician do not stop taking until medicine is all gone. Wound Care Notes:   Possible cast next week 11/2/22  [facility] in [address] for this week: 12/14/22     Right Great Toe cultured 11/30/22     Right Great Toe - Wash with soap and water, pat dry. Apply Gentamicin and Stimulen to wound bed. Cover with small piece of ABD. Wrap with Conform and secure with tape. Change daily.         Follow up with Shauna Carreno CNP in 2 weeks in the wound care center. Call [phone] for any questions or concerns.

## 2022-12-28 ENCOUNTER — HOSPITAL ENCOUNTER (OUTPATIENT)
Dept: WOUND CARE | Age: 27
Discharge: HOME | End: 2022-12-28
Payer: COMMERCIAL

## 2022-12-28 VITALS
DIASTOLIC BLOOD PRESSURE: 76 MMHG | RESPIRATION RATE: 18 BRPM | TEMPERATURE: 97.9 F | SYSTOLIC BLOOD PRESSURE: 108 MMHG | HEART RATE: 106 BPM

## 2022-12-28 DIAGNOSIS — L97.519 DIABETIC ULCER OF RIGHT GREAT TOE (HCC): Primary | ICD-10-CM

## 2022-12-28 DIAGNOSIS — E11.621 DIABETIC ULCER OF RIGHT GREAT TOE (HCC): Primary | ICD-10-CM

## 2022-12-28 DIAGNOSIS — L97.512 CHRONIC ULCER OF GREAT TOE OF RIGHT FOOT WITH FAT LAYER EXPOSED (HCC): ICD-10-CM

## 2022-12-28 PROCEDURE — 99213 OFFICE O/P EST LOW 20 MIN: CPT | Performed by: NURSE PRACTITIONER

## 2022-12-28 PROCEDURE — 11042 DBRDMT SUBQ TIS 1ST 20SQCM/<: CPT

## 2022-12-28 PROCEDURE — 11042 DBRDMT SUBQ TIS 1ST 20SQCM/<: CPT | Performed by: NURSE PRACTITIONER

## 2022-12-28 PROCEDURE — 6370000000 HC RX 637 (ALT 250 FOR IP): Performed by: NURSE PRACTITIONER

## 2022-12-28 RX ORDER — GENTAMICIN SULFATE 1 MG/G
OINTMENT TOPICAL ONCE
OUTPATIENT
Start: 2022-12-28 | End: 2022-12-28

## 2022-12-28 RX ORDER — GENTAMICIN SULFATE 1 MG/G
OINTMENT TOPICAL ONCE
Status: COMPLETED | OUTPATIENT
Start: 2022-12-28 | End: 2022-12-28

## 2022-12-28 RX ORDER — LIDOCAINE 40 MG/G
CREAM TOPICAL ONCE
OUTPATIENT
Start: 2022-12-28 | End: 2022-12-28

## 2022-12-28 RX ORDER — LIDOCAINE HYDROCHLORIDE 20 MG/ML
JELLY TOPICAL ONCE
OUTPATIENT
Start: 2022-12-28 | End: 2022-12-28

## 2022-12-28 RX ORDER — BETAMETHASONE DIPROPIONATE 0.05 %
OINTMENT (GRAM) TOPICAL ONCE
OUTPATIENT
Start: 2022-12-28 | End: 2022-12-28

## 2022-12-28 RX ORDER — BACITRACIN, NEOMYCIN, POLYMYXIN B 400; 3.5; 5 [USP'U]/G; MG/G; [USP'U]/G
OINTMENT TOPICAL ONCE
OUTPATIENT
Start: 2022-12-28 | End: 2022-12-28

## 2022-12-28 RX ORDER — LIDOCAINE 50 MG/G
OINTMENT TOPICAL ONCE
OUTPATIENT
Start: 2022-12-28 | End: 2022-12-28

## 2022-12-28 RX ORDER — GINSENG 100 MG
CAPSULE ORAL ONCE
OUTPATIENT
Start: 2022-12-28 | End: 2022-12-28

## 2022-12-28 RX ORDER — LIDOCAINE 50 MG/G
OINTMENT TOPICAL ONCE
Status: COMPLETED | OUTPATIENT
Start: 2022-12-28 | End: 2022-12-28

## 2022-12-28 RX ORDER — LIDOCAINE HYDROCHLORIDE 40 MG/ML
SOLUTION TOPICAL ONCE
OUTPATIENT
Start: 2022-12-28 | End: 2022-12-28

## 2022-12-28 RX ORDER — BACITRACIN ZINC AND POLYMYXIN B SULFATE 500; 1000 [USP'U]/G; [USP'U]/G
OINTMENT TOPICAL ONCE
OUTPATIENT
Start: 2022-12-28 | End: 2022-12-28

## 2022-12-28 RX ORDER — CLOBETASOL PROPIONATE 0.5 MG/G
OINTMENT TOPICAL ONCE
OUTPATIENT
Start: 2022-12-28 | End: 2022-12-28

## 2022-12-28 RX ADMIN — LIDOCAINE: 50 OINTMENT TOPICAL at 13:47

## 2022-12-28 RX ADMIN — GENTAMICIN SULFATE: 1 OINTMENT TOPICAL at 14:14

## 2022-12-28 ASSESSMENT — PAIN SCALES - GENERAL: PAINLEVEL_OUTOF10: 0

## 2022-12-28 NOTE — PROGRESS NOTES
Wound Care Center Progress Note With Procedure    Joselyn Alexander  AGE: 32 y.o. GENDER: female  : 1995  EPISODE DATE:  2022     Subjective:     Chief Complaint   Patient presents with    Wound Check     Right toe          HISTORY of PRESENT ILLNESS        Joselyn Alexander is a 32 y.o. female who presents to the [address] [place] [address] for a visit for evaluation and treatment of Chronic diabetic  ulcer(s) of  Rt. foot hallux. The condition is of moderate severity. The ulcer has been present since 2022. The underlying cause is thought to be diabetes. The patients care to date has included regular wound care in [place] . The patient has significant underlying medical conditions as below. Patient has a separate opening this week  Noticed this when getting out of the shower  Not the same spot as previously     Foot care: advised to wash feet daily, pat dry and apply lotion at night, avoiding between toes. Need to look at feet daily and report to a physician any signs of inflammation or skin damage. Discussed diabetes shoes and socks. Different diabetic medications. Managing high and low sugar readings. Patient educated on the 6 essential components necessary for wound healing: Circulation, Debridements, Proper Dressings and Topical Wound Products, Infection Control, Edema Control and Offloading. Patient educated on those factors that negatively effect or impact wound healing: smoking, obesity, uncontrolled diabetes, anticoagulant and immunosuppressive regimens, inadequate nutrition, untreated arterial and venous disease if applicable and measures to manage edema. Wound Pain Timing/Severity: none  Quality of pain: N/A  Severity of pain:  0 / 10  Modifying Factors: diabetes  Associated Signs/Symptoms: erythema, drainage, and numbness        PAST MEDICAL HISTORY        Diagnosis Date    Diabetes mellitus ([facility] [address]        PAST SURGICAL HISTORY    History reviewed.  No pertinent surgical history. FAMILY HISTORY    Family History   Problem Relation Age of Onset    Diabetes Father     Heart Disease Father     Depression Maternal Grandmother     Depression Maternal Grandfather     Depression Paternal Grandmother     Depression Paternal Grandfather        SOCIAL HISTORY    Social History     Tobacco Use    Smoking status: Former     Types: Cigarettes     Quit date: 2013     Years since quittin.0    Smokeless tobacco: Never    Tobacco comments:     EDUCATED DM II SLOWS WOUND HEALING   Substance Use Topics    Alcohol use: No    Drug use: No       ALLERGIES    No Known Allergies    MEDICATIONS    Current Outpatient Medications on File Prior to Encounter   Medication Sig Dispense Refill    busPIRone (BUSPAR) 5 MG tablet Take 5 mg by mouth 3 times daily as needed      lisinopril (PRINIVIL;ZESTRIL) 5 MG tablet Take 5 mg by mouth daily      metoclopramide (REGLAN) 10 MG tablet Take 10 mg by mouth 3 times daily      Dulaglutide (TRULICITY) 4.5 NT/6.9ZC SOPN Inject 4.5 mg into the skin once a week      Cholecalciferol (VITAMIN D3) 50 MCG (2000 UT) CAPS Take 1 capsule by mouth daily      budesonide-formoterol (SYMBICORT) 80-4.5 MCG/ACT AERO Inhale 2 puffs into the lungs 2 times daily      atorvastatin (LIPITOR) 10 MG tablet Take 10 mg by mouth daily      Norgestim-Eth Estrad Triphasic (TRI-ESTARYLLA PO) Take 1 tablet by mouth daily      Empagliflozin-metFORMIN HCl ER (SYNJARDY XR) 12.5-1000 MG TB24 Take 1 tablet by mouth 2 times daily      vitamin C (ASCORBIC ACID) 500 MG tablet Take 1,000 mg by mouth daily      zinc gluconate 50 MG tablet Take 50 mg by mouth daily      naproxen (NAPROSYN) 500 MG tablet Take 1 tablet by mouth 2 times daily as needed for Pain 20 tablet 0     No current facility-administered medications on file prior to encounter. REVIEW OF SYSTEMS    Pertinent items are noted in HPI.     Constitutional: Negative for systemic symptoms including fever, chills and malaise. Objective:      /76   Pulse (!) 106   Temp 97.9 °F (36.6 °C) (Temporal)   Resp 18     PHYSICAL EXAM      General: The patient is in no acute distress. Mental status:  Patient is appropriate, is  oriented to place and plan of care. Dermatologic exam: Visual inspection of the periwound reveals the skin to be normal in turgor and texture, dry, and coarse  Wound exam: see wound description below in procedure note      Assessment:     Problem List Items Addressed This Visit          Endocrine    WD-Diabetic ulcer of right great toe ([address] - Primary    Relevant Orders    Initiate Outpatient Wound Care Protocol       Other    WD-Chronic ulcer of great toe of right foot with fat layer exposed ([address]    Relevant Orders    Initiate Outpatient Wound Care Protocol     Procedure Note    Indications:  Based on my examination of this patient's wound(s) today, sharp excision into necrotic subcutaneous tissue is required to promote healing and evaluate the extent of previous healing. Performed by: CARMEN Grider - CNP    Consent obtained: Yes    Time out taken:  Yes    Pain Control: N/A      Debridement:Excisional Debridement    Using curette the wound(s) was/were sharply debrided down through and including the removal of subcutaneous tissue.         Devitalized Tissue Debrided:  fibrin, biofilm, slough, necrotic/eschar, and exudate    Pre Debridement Measurements:  Are located in the Wound Documentation Flow Sheet    All active wounds listed below with today's date are evaluated  Wound(s)    debrided this date include # : 1     Post  Debridement Measurements:  Wound 07/20/22 Toe (Comment  which one) Anterior;Right #1 GR (Active)   Wound Image   12/07/22 1352   Wound Etiology Diabetic 12/28/22 1344   Dressing Status New dressing applied 12/14/22 1436   Wound Cleansed Soap and water 12/28/22 1344   Offloading for Diabetic Foot Ulcers Forefoot offloading shoe 12/28/22 1344   Wound Length (cm) 0.6 cm 12/28/22 1344   Wound Width (cm) 0.2 cm 12/28/22 1344   Wound Depth (cm) 0.1 cm 12/28/22 1344   Wound Surface Area (cm^2) 0.12 cm^2 12/28/22 1344   Change in Wound Size % (l*w) 88 12/28/22 1344   Wound Volume (cm^3) 0.012 cm^3 12/28/22 1344   Wound Healing % 88 12/28/22 1344   Post-Procedure Length (cm) 0.6 cm 12/07/22 1418   Post-Procedure Width (cm) 0.3 cm 12/07/22 1418   Post-Procedure Depth (cm) 0.1 cm 12/07/22 1418   Post-Procedure Surface Area (cm^2) 0.18 cm^2 12/07/22 1418   Post-Procedure Volume (cm^3) 0.018 cm^3 12/07/22 1418   Distance Tunneling (cm) 0 cm 12/28/22 1344   Tunneling Position ___ O'Clock 0 12/28/22 1344   Undermining Starts ___ O'Clock 0 12/28/22 1344   Undermining Ends___ O'Clock 0 12/28/22 1344   Undermining Maxium Distance (cm) 0 12/28/22 1344   Wound Assessment Devitalized tissue 12/28/22 1344   Drainage Amount Small 12/28/22 1344   Drainage Description Serosanguinous; Yellow 12/28/22 1344   Odor None 12/28/22 1344   Annika-wound Assessment Hyperkeratosis (callous) 12/28/22 1344   Margins Attached edges 12/28/22 1344   Wound Thickness Description not for Pressure Injury Full thickness 12/28/22 1344   Number of days: 161       Percent of Wound(s) Debrided: approximately 100%    Total  Area  Debrided:  0.12 sq cm     Bleeding:  Minimal    Hemostasis Achieved:  by pressure    Procedural Pain:  0  / 10     Post Procedural Pain:  0 / 10     Response to treatment:  Well tolerated by patient. Status of wound progress and description from last visit:   New wound. Will continue plan of care as before that healed her wound  Follow up 1 week  Will consider TCC if no progress      Plan:       Discharge Instructions         PHYSICIAN ORDERS AND DISCHARGE INSTRUCTIONS     NOTE: [facility] you will receive a patient experience survey. We would be grateful if you would take the time to fill this survey out.      Wound care order history:                 MAX's   Right Left               Date:              Cultures:  Right Great Toe cultured 11/30/22              Grafts:  Applied for grafts 11/9/22              Antibiotics:           Continuing wound care orders and information:                 Residence:                Continue home health care with:              Your wound-care supplies will be provided by: Wound cleansing:              Do not scrub or use excessive force. Wash hands with soap and water before and after dressing changes. Prior to applying a clean dressing, cleanse wound with normal saline, wound cleanser, or mild soap and water. Ask the physician or nurse before getting the wound(s) wet in a shower     Daily Wound management:              Keep weight off wounds and reposition every 2 hours. Avoid standing for long periods of time. Apply wraps/stockings in AM and remove at bedtime. If swelling is present, elevate legs to the level of the heart or above for 30 minutes 4-5 times a day and/or when sitting. When taking antibiotics take entire prescription as ordered by physician do not stop taking until medicine is all gone. Wound Care Notes:   Possible cast next week 11/2/22  [facility] [address] [place] [address] [facility] for this week: 12/28/22     Right Great Toe cultured 11/30/22     Right Great Toe - Wash with soap and water, pat dry. Apply Gentamicin and Stimulen to wound bed. Cover with Ioplex (if available), otherwise use saline damp Hydrofera blue cut to size of wound. Cover with small piece of ABD. Wrap with Conform and secure with tape. Change daily. Follow up with Salome Mike CNP in 1 week in the wound care center.   Call [phone] for any questions or concerns        Treatment Note      Written Patient Dismissal Instructions Given            Electronically signed by CARMEN Vo - CNP on 12/28/2022 at 2:07 PM

## 2022-12-28 NOTE — DISCHARGE INSTRUCTIONS
PHYSICIAN ORDERS AND DISCHARGE INSTRUCTIONS     NOTE: Upon discharge from the [address], you will receive a patient experience survey. We would be grateful if you would take the time to fill this survey out. Wound care order history:                 MAX's   Right       Left               Date:              Cultures:  Right Great Toe cultured 11/30/22              Grafts:  Applied for grafts 11/9/22              Antibiotics:           Continuing wound care orders and information:                 Residence:                Continue home health care with:              Your wound-care supplies will be provided by: Wound cleansing:              Do not scrub or use excessive force. Wash hands with soap and water before and after dressing changes. Prior to applying a clean dressing, cleanse wound with normal saline, wound cleanser, or mild soap and water. Ask the physician or nurse before getting the wound(s) wet in a shower     Daily Wound management:              Keep weight off wounds and reposition every 2 hours. Avoid standing for long periods of time. Apply wraps/stockings in AM and remove at bedtime. If swelling is present, elevate legs to the level of the heart or above for 30 minutes 4-5 times a day and/or when sitting. When taking antibiotics take entire prescription as ordered by physician do not stop taking until medicine is all gone. Wound Care Notes:   Possible cast next week 11/2/22  [facility] in [address] for this week: 12/28/22     Right Great Toe cultured 11/30/22     Right Great Toe - Wash with soap and water, pat dry. Apply Gentamicin and Stimulen to wound bed. Cover with Ioplex (if available), otherwise use saline damp Hydrofera blue cut to size of wound. Cover with small piece of ABD.   Wrap with Conform and secure with tape. Change daily. Follow up with Pastor Jonna CNP in 1 week in the wound care center.   Call [phone] for any questions or concerns

## 2023-01-04 ENCOUNTER — HOSPITAL ENCOUNTER (OUTPATIENT)
Dept: WOUND CARE | Age: 28
Discharge: HOME | End: 2023-01-04
Payer: COMMERCIAL

## 2023-01-04 VITALS
TEMPERATURE: 96.9 F | DIASTOLIC BLOOD PRESSURE: 82 MMHG | SYSTOLIC BLOOD PRESSURE: 118 MMHG | RESPIRATION RATE: 17 BRPM | HEART RATE: 103 BPM

## 2023-01-04 DIAGNOSIS — L97.512 CHRONIC ULCER OF GREAT TOE OF RIGHT FOOT WITH FAT LAYER EXPOSED (HCC): ICD-10-CM

## 2023-01-04 DIAGNOSIS — E11.621 DIABETIC ULCER OF RIGHT GREAT TOE (HCC): Primary | ICD-10-CM

## 2023-01-04 DIAGNOSIS — L97.519 DIABETIC ULCER OF RIGHT GREAT TOE (HCC): Primary | ICD-10-CM

## 2023-01-04 PROCEDURE — 11042 DBRDMT SUBQ TIS 1ST 20SQCM/<: CPT

## 2023-01-04 PROCEDURE — 11042 DBRDMT SUBQ TIS 1ST 20SQCM/<: CPT | Performed by: NURSE PRACTITIONER

## 2023-01-04 RX ORDER — GENTAMICIN SULFATE 1 MG/G
OINTMENT TOPICAL ONCE
OUTPATIENT
Start: 2023-01-04 | End: 2023-01-04

## 2023-01-04 RX ORDER — BETAMETHASONE DIPROPIONATE 0.05 %
OINTMENT (GRAM) TOPICAL ONCE
OUTPATIENT
Start: 2023-01-04 | End: 2023-01-04

## 2023-01-04 RX ORDER — LIDOCAINE HYDROCHLORIDE 20 MG/ML
JELLY TOPICAL ONCE
OUTPATIENT
Start: 2023-01-04 | End: 2023-01-04

## 2023-01-04 RX ORDER — LIDOCAINE 40 MG/G
CREAM TOPICAL ONCE
OUTPATIENT
Start: 2023-01-04 | End: 2023-01-04

## 2023-01-04 RX ORDER — BACITRACIN ZINC AND POLYMYXIN B SULFATE 500; 1000 [USP'U]/G; [USP'U]/G
OINTMENT TOPICAL ONCE
OUTPATIENT
Start: 2023-01-04 | End: 2023-01-04

## 2023-01-04 RX ORDER — LIDOCAINE HYDROCHLORIDE 40 MG/ML
SOLUTION TOPICAL ONCE
OUTPATIENT
Start: 2023-01-04 | End: 2023-01-04

## 2023-01-04 RX ORDER — GINSENG 100 MG
CAPSULE ORAL ONCE
OUTPATIENT
Start: 2023-01-04 | End: 2023-01-04

## 2023-01-04 RX ORDER — CLOBETASOL PROPIONATE 0.5 MG/G
OINTMENT TOPICAL ONCE
OUTPATIENT
Start: 2023-01-04 | End: 2023-01-04

## 2023-01-04 RX ORDER — LIDOCAINE 50 MG/G
OINTMENT TOPICAL ONCE
OUTPATIENT
Start: 2023-01-04 | End: 2023-01-04

## 2023-01-04 RX ORDER — BACITRACIN, NEOMYCIN, POLYMYXIN B 400; 3.5; 5 [USP'U]/G; MG/G; [USP'U]/G
OINTMENT TOPICAL ONCE
OUTPATIENT
Start: 2023-01-04 | End: 2023-01-04

## 2023-01-04 ASSESSMENT — PAIN - FUNCTIONAL ASSESSMENT: PAIN_FUNCTIONAL_ASSESSMENT: PREVENTS OR INTERFERES SOME ACTIVE ACTIVITIES AND ADLS

## 2023-01-04 ASSESSMENT — PAIN DESCRIPTION - DESCRIPTORS: DESCRIPTORS: STABBING;THROBBING;ACHING

## 2023-01-04 ASSESSMENT — PAIN DESCRIPTION - ORIENTATION: ORIENTATION: RIGHT

## 2023-01-04 ASSESSMENT — PAIN SCALES - GENERAL: PAINLEVEL_OUTOF10: 5

## 2023-01-04 ASSESSMENT — PAIN DESCRIPTION - LOCATION: LOCATION: TOE (COMMENT WHICH ONE)

## 2023-01-04 NOTE — DISCHARGE INSTRUCTIONS
PHYSICIAN ORDERS AND DISCHARGE INSTRUCTIONS     NOTE: Upon discharge from the [address], you will receive a patient experience survey. We would be grateful if you would take the time to fill this survey out. Wound care order history:                 MAX's   Right       Left               Date:              Cultures:  Right Great Toe cultured 11/30/22              Grafts:  Applied for grafts 11/9/22              Antibiotics:           Continuing wound care orders and information:                 Residence:                Continue home health care with:              Your wound-care supplies will be provided by: Wound cleansing:              Do not scrub or use excessive force. Wash hands with soap and water before and after dressing changes. Prior to applying a clean dressing, cleanse wound with normal saline, wound cleanser, or mild soap and water. Ask the physician or nurse before getting the wound(s) wet in a shower     Daily Wound management:              Keep weight off wounds and reposition every 2 hours. Avoid standing for long periods of time. Apply wraps/stockings in AM and remove at bedtime. If swelling is present, elevate legs to the level of the heart or above for 30 minutes 4-5 times a day and/or when sitting. When taking antibiotics take entire prescription as ordered by physician do not stop taking until medicine is all gone. Wound Care Notes:   Possible cast next week 11/2/22  [facility] [address] for this week: 1/4/23     Right Great Toe cultured 11/30/22     Right Great Toe - Wash with soap and water, pat dry. Apply Anasept and Stimulen to wound bed. Cover with Ioplex (if available), otherwise use saline damp Hydrofera blue cut to size of wound. Cover with small piece of ABD.   Wrap with Conform and secure with tape. Change daily. Follow up with Marie Pitt CNP in 1 week in the wound care center.   Call [phone] for any questions or concerns

## 2023-01-04 NOTE — PROGRESS NOTES
Wound Care Center Progress Note With Procedure    Afua Caal  AGE: 32 y.o. GENDER: female  : 1995  EPISODE DATE:  2023     Subjective:     Chief Complaint   Patient presents with    Wound Check     Foot          HISTORY of PRESENT ILLNESS     Afua Caal is a 32 y.o. female who presents to the [address] [place] [address] for a visit for evaluation and treatment of Chronic diabetic  ulcer(s) of  Rt. foot hallux. The condition is of moderate severity. The ulcer has been present since 2022. The underlying cause is thought to be diabetes. The patients care to date has included regular wound care in [place] . The patient has significant underlying medical conditions as below. Wound is better in appearance this week. Patient not completing dressing changes as ordered. Came in with elly painted with betadine and needed a heavy clean up  Wound is measuring slightly larger but is improved in character     Foot care: advised to wash feet daily, pat dry and apply lotion at night, avoiding between toes. Need to look at feet daily and report to a physician any signs of inflammation or skin damage. Discussed diabetes shoes and socks. Different diabetic medications. Managing high and low sugar readings. Patient educated on the 6 essential components necessary for wound healing: Circulation, Debridements, Proper Dressings and Topical Wound Products, Infection Control, Edema Control and Offloading. Patient educated on those factors that negatively effect or impact wound healing: smoking, obesity, uncontrolled diabetes, anticoagulant and immunosuppressive regimens, inadequate nutrition, untreated arterial and venous disease if applicable and measures to manage edema.        Wound Pain Timing/Severity: none  Quality of pain: N/A  Severity of pain:  0 / 10  Modifying Factors: diabetes  Associated Signs/Symptoms: erythema, drainage, and numbness        PAST MEDICAL HISTORY        Diagnosis Date    Diabetes mellitus ([address]        PAST SURGICAL HISTORY    History reviewed. No pertinent surgical history. FAMILY HISTORY    Family History   Problem Relation Age of Onset    Diabetes Father     Heart Disease Father     Depression Maternal Grandmother     Depression Maternal Grandfather     Depression Paternal Grandmother     Depression Paternal Grandfather        SOCIAL HISTORY    Social History     Tobacco Use    Smoking status: Former     Types: Cigarettes     Quit date: 2013     Years since quittin.0    Smokeless tobacco: Never    Tobacco comments:     EDUCATED DM II SLOWS WOUND HEALING   Substance Use Topics    Alcohol use: No    Drug use: No       ALLERGIES    No Known Allergies    MEDICATIONS    Current Outpatient Medications on File Prior to Encounter   Medication Sig Dispense Refill    busPIRone (BUSPAR) 5 MG tablet Take 5 mg by mouth 3 times daily as needed      lisinopril (PRINIVIL;ZESTRIL) 5 MG tablet Take 5 mg by mouth daily      metoclopramide (REGLAN) 10 MG tablet Take 10 mg by mouth 3 times daily      Dulaglutide (TRULICITY) 4.5 TS/6.1FX SOPN Inject 4.5 mg into the skin once a week      Cholecalciferol (VITAMIN D3) 50 MCG ( UT) CAPS Take 1 capsule by mouth daily      budesonide-formoterol (SYMBICORT) 80-4.5 MCG/ACT AERO Inhale 2 puffs into the lungs 2 times daily      atorvastatin (LIPITOR) 10 MG tablet Take 10 mg by mouth daily      Norgestim-Eth Estrad Triphasic (TRI-ESTARYLLA PO) Take 1 tablet by mouth daily      Empagliflozin-metFORMIN HCl ER (SYNJARDY XR) 12.5-1000 MG TB24 Take 1 tablet by mouth 2 times daily      vitamin C (ASCORBIC ACID) 500 MG tablet Take 1,000 mg by mouth daily      zinc gluconate 50 MG tablet Take 50 mg by mouth daily      naproxen (NAPROSYN) 500 MG tablet Take 1 tablet by mouth 2 times daily as needed for Pain 20 tablet 0     No current facility-administered medications on file prior to encounter.        REVIEW OF SYSTEMS    Pertinent items are noted in HPI. Constitutional: Negative for systemic symptoms including fever, chills and malaise. Objective:      /82   Pulse (!) 103   Temp 96.9 °F (36.1 °C) (Temporal)   Resp 17     PHYSICAL EXAM      General: The patient is in no acute distress. Mental status:  Patient is appropriate, is  oriented to place and plan of care. Dermatologic exam: Visual inspection of the periwound reveals the skin to be normal in turgor and texture, dry, and coarse  Wound exam: see wound description below in procedure note      Assessment:     Problem List Items Addressed This Visit          Endocrine    WD-Diabetic ulcer of right great toe ([address] - Primary    Relevant Orders    Initiate Outpatient Wound Care Protocol       Other    WD-Chronic ulcer of great toe of right foot with fat layer exposed ([address]    Relevant Orders    Initiate Outpatient Wound Care Protocol     Procedure Note    Indications:  Based on my examination of this patient's wound(s) today, sharp excision into necrotic subcutaneous tissue is required to promote healing and evaluate the extent of previous healing. Performed by: CARMEN Abarca CNP    Consent obtained: Yes    Time out taken:  Yes    Pain Control: N/A      Debridement:Excisional Debridement    Using curette the wound(s) was/were sharply debrided down through and including the removal of subcutaneous tissue.         Devitalized Tissue Debrided:  fibrin, biofilm, slough, necrotic/eschar, exudate, and callus    Pre Debridement Measurements:  Are located in the Wound Documentation Flow Sheet    All active wounds listed below with today's date are evaluated  Wound(s)    debrided this date include # : 1     Post  Debridement Measurements:  Wound 07/20/22 Toe (Comment  which one) Anterior;Right #1 GR (Active)   Wound Image   12/07/22 1352   Wound Etiology Diabetic 01/04/23 1341   Dressing Status New dressing applied 12/28/22 1411   Wound Cleansed Wound cleanser 01/04/23 1341 Offloading for Diabetic Foot Ulcers Forefoot offloading shoe 01/04/23 1341   Wound Length (cm) 0 cm 01/04/23 1341   Wound Width (cm) 0 cm 01/04/23 1341   Wound Depth (cm) 0 cm 01/04/23 1341   Wound Surface Area (cm^2) 0 cm^2 01/04/23 1341   Change in Wound Size % (l*w) 100 01/04/23 1341   Wound Volume (cm^3) 0 cm^3 01/04/23 1341   Wound Healing % 100 01/04/23 1341   Post-Procedure Length (cm) 0.6 cm 12/28/22 1407   Post-Procedure Width (cm) 0.2 cm 12/28/22 1407   Post-Procedure Depth (cm) 0.1 cm 12/28/22 1407   Post-Procedure Surface Area (cm^2) 0.12 cm^2 12/28/22 1407   Post-Procedure Volume (cm^3) 0.012 cm^3 12/28/22 1407   Distance Tunneling (cm) 0 cm 01/04/23 1341   Tunneling Position ___ O'Clock 0 01/04/23 1341   Undermining Starts ___ O'Clock 0 01/04/23 1341   Undermining Ends___ O'Clock 0 01/04/23 1341   Undermining Maxium Distance (cm) 0 01/04/23 1341   Wound Assessment Devitalized tissue 01/04/23 1341   Drainage Amount Small 12/28/22 1344   Drainage Description Serosanguinous; Yellow 12/28/22 1344   Odor None 01/04/23 1341   Annika-wound Assessment Hyperkeratosis (callous) 01/04/23 1341   Margins Attached edges 01/04/23 1341   Wound Thickness Description not for Pressure Injury Full thickness 01/04/23 1341   Number of days: 168       Percent of Wound(s) Debrided: approximately 100%    Total  Area  Debrided:  0.84 sq cm     Bleeding:  Minimal    Hemostasis Achieved:  by pressure    Procedural Pain:  1  / 10     Post Procedural Pain:  0 / 10     Response to treatment:  Well tolerated by patient. Status of wound progress and description from last visit:   Improved. Educated patient on proper dressing changes  Sent her home with extra supplies  Will continue to monitor and follow up 1 week       Plan:       Discharge Instructions         [address]     NOTE: Upon discharge from the [address], you will receive a patient experience survey.  We would be grateful if you would take the time to fill this survey out. Wound care order history:                 MAX's   Right       Left               Date:              Cultures:  Right Great Toe cultured 11/30/22              Grafts:  Applied for grafts 11/9/22              Antibiotics:           Continuing wound care orders and information:                 Residence:                Continue home health care with:              Your wound-care supplies will be provided by: Wound cleansing:              Do not scrub or use excessive force. Wash hands with soap and water before and after dressing changes. Prior to applying a clean dressing, cleanse wound with normal saline, wound cleanser, or mild soap and water. Ask the physician or nurse before getting the wound(s) wet in a shower     Daily Wound management:              Keep weight off wounds and reposition every 2 hours. Avoid standing for long periods of time. Apply wraps/stockings in AM and remove at bedtime. If swelling is present, elevate legs to the level of the heart or above for 30 minutes 4-5 times a day and/or when sitting. When taking antibiotics take entire prescription as ordered by physician do not stop taking until medicine is all gone. Wound Care Notes:   Possible cast next week 11/2/22  Rx: [facility] on [address] [place] [address] in [facility] for this week: 1/4/23     Right Great Toe cultured 11/30/22     Right Great Toe - Wash with soap and water, pat dry. Apply Anasept and Stimulen to wound bed. Cover with Ioplex (if available), otherwise use saline damp Hydrofera blue cut to size of wound. Cover with small piece of ABD. Wrap with Conform and secure with tape. Change daily. Follow up with Manny Richardson CNP in 1 week in the wound care center.   Call [phone] for any questions or concerns        Treatment Note      Written Patient Dismissal Instructions Given            Electronically signed by CARMEN Tracy CNP on 1/4/2023 at 1:57 PM

## 2023-01-11 ENCOUNTER — HOSPITAL ENCOUNTER (OUTPATIENT)
Dept: WOUND CARE | Age: 28
Discharge: HOME | End: 2023-01-11
Payer: COMMERCIAL

## 2023-01-11 VITALS
SYSTOLIC BLOOD PRESSURE: 120 MMHG | TEMPERATURE: 96.6 F | HEART RATE: 104 BPM | RESPIRATION RATE: 18 BRPM | DIASTOLIC BLOOD PRESSURE: 86 MMHG

## 2023-01-11 DIAGNOSIS — L97.512 CHRONIC ULCER OF GREAT TOE OF RIGHT FOOT WITH FAT LAYER EXPOSED (HCC): ICD-10-CM

## 2023-01-11 DIAGNOSIS — L97.519 DIABETIC ULCER OF RIGHT GREAT TOE (HCC): Primary | ICD-10-CM

## 2023-01-11 DIAGNOSIS — E11.621 DIABETIC ULCER OF RIGHT GREAT TOE (HCC): Primary | ICD-10-CM

## 2023-01-11 PROCEDURE — 6370000000 HC RX 637 (ALT 250 FOR IP): Performed by: NURSE PRACTITIONER

## 2023-01-11 PROCEDURE — 11042 DBRDMT SUBQ TIS 1ST 20SQCM/<: CPT

## 2023-01-11 PROCEDURE — 11042 DBRDMT SUBQ TIS 1ST 20SQCM/<: CPT | Performed by: NURSE PRACTITIONER

## 2023-01-11 RX ORDER — LIDOCAINE 40 MG/G
CREAM TOPICAL ONCE
OUTPATIENT
Start: 2023-01-11 | End: 2023-01-11

## 2023-01-11 RX ORDER — LIDOCAINE HYDROCHLORIDE 40 MG/ML
SOLUTION TOPICAL ONCE
OUTPATIENT
Start: 2023-01-11 | End: 2023-01-11

## 2023-01-11 RX ORDER — GENTAMICIN SULFATE 1 MG/G
OINTMENT TOPICAL ONCE
OUTPATIENT
Start: 2023-01-11 | End: 2023-01-11

## 2023-01-11 RX ORDER — LIDOCAINE HYDROCHLORIDE 20 MG/ML
JELLY TOPICAL ONCE
OUTPATIENT
Start: 2023-01-11 | End: 2023-01-11

## 2023-01-11 RX ORDER — GINSENG 100 MG
CAPSULE ORAL ONCE
OUTPATIENT
Start: 2023-01-11 | End: 2023-01-11

## 2023-01-11 RX ORDER — BACITRACIN ZINC AND POLYMYXIN B SULFATE 500; 1000 [USP'U]/G; [USP'U]/G
OINTMENT TOPICAL ONCE
OUTPATIENT
Start: 2023-01-11 | End: 2023-01-11

## 2023-01-11 RX ORDER — BETAMETHASONE DIPROPIONATE 0.05 %
OINTMENT (GRAM) TOPICAL ONCE
OUTPATIENT
Start: 2023-01-11 | End: 2023-01-11

## 2023-01-11 RX ORDER — BACITRACIN, NEOMYCIN, POLYMYXIN B 400; 3.5; 5 [USP'U]/G; MG/G; [USP'U]/G
OINTMENT TOPICAL ONCE
OUTPATIENT
Start: 2023-01-11 | End: 2023-01-11

## 2023-01-11 RX ORDER — LIDOCAINE HYDROCHLORIDE 40 MG/ML
SOLUTION TOPICAL ONCE
Status: COMPLETED | OUTPATIENT
Start: 2023-01-11 | End: 2023-01-11

## 2023-01-11 RX ORDER — LIDOCAINE 50 MG/G
OINTMENT TOPICAL ONCE
OUTPATIENT
Start: 2023-01-11 | End: 2023-01-11

## 2023-01-11 RX ORDER — CLOBETASOL PROPIONATE 0.5 MG/G
OINTMENT TOPICAL ONCE
OUTPATIENT
Start: 2023-01-11 | End: 2023-01-11

## 2023-01-11 RX ADMIN — LIDOCAINE HYDROCHLORIDE: 40 SOLUTION TOPICAL at 13:39

## 2023-01-11 ASSESSMENT — PAIN DESCRIPTION - ORIENTATION: ORIENTATION: RIGHT

## 2023-01-11 ASSESSMENT — PAIN DESCRIPTION - LOCATION: LOCATION: TOE (COMMENT WHICH ONE)

## 2023-01-11 ASSESSMENT — PAIN DESCRIPTION - PAIN TYPE: TYPE: ACUTE PAIN

## 2023-01-11 ASSESSMENT — PAIN DESCRIPTION - ONSET: ONSET: ON-GOING

## 2023-01-11 ASSESSMENT — PAIN DESCRIPTION - DESCRIPTORS: DESCRIPTORS: ACHING;STABBING;THROBBING

## 2023-01-11 ASSESSMENT — PAIN SCALES - GENERAL: PAINLEVEL_OUTOF10: 3

## 2023-01-11 ASSESSMENT — PAIN - FUNCTIONAL ASSESSMENT: PAIN_FUNCTIONAL_ASSESSMENT: PREVENTS OR INTERFERES SOME ACTIVE ACTIVITIES AND ADLS

## 2023-01-11 ASSESSMENT — PAIN DESCRIPTION - FREQUENCY: FREQUENCY: INTERMITTENT

## 2023-01-11 NOTE — DISCHARGE INSTRUCTIONS
PHYSICIAN ORDERS AND DISCHARGE INSTRUCTIONS     NOTE: Upon discharge from the [address], you will receive a patient experience survey. We would be grateful if you would take the time to fill this survey out. Wound care order history:                 MAX's   Right       Left               Date:              Cultures:  Right Great Toe cultured 11/30/22              Grafts:  Applied for grafts 11/9/22              Antibiotics:           Continuing wound care orders and information:                 Residence:                Continue home health care with:              Your wound-care supplies will be provided by: Wound cleansing:              Do not scrub or use excessive force. Wash hands with soap and water before and after dressing changes. Prior to applying a clean dressing, cleanse wound with normal saline, wound cleanser, or mild soap and water. Ask the physician or nurse before getting the wound(s) wet in a shower     Daily Wound management:              Keep weight off wounds and reposition every 2 hours. Avoid standing for long periods of time. Apply wraps/stockings in AM and remove at bedtime. If swelling is present, elevate legs to the level of the heart or above for 30 minutes 4-5 times a day and/or when sitting. When taking antibiotics take entire prescription as ordered by physician do not stop taking until medicine is all gone. Wound Care Notes:   Possible cast next week 11/2/22  [facility] in [facility] for this week: 1/11/23     Right Great Toe cultured 11/30/22     Right Great Toe - Wash with soap and water, pat dry. Apply Anasept and Stimulen to wound bed. Cover with Ioplex (if available), otherwise use saline damp Hydrofera blue cut to size of wound. Cover with small piece of ABD.   Wrap with Conform and secure with tape. Change daily. Follow up with Janina Quinn CNP in 1 week in the wound care center.   Call [phone] for any questions or concerns

## 2023-01-11 NOTE — PROGRESS NOTES
Wound Care Center Progress Note With Procedure    Hector Calderon  AGE: 32 y.o. GENDER: female  : 1995  EPISODE DATE:  2023     Subjective:     Chief Complaint   Patient presents with    Wound Check     Right great toe         HISTORY of PRESENT ILLNESS     Hector Calderon is a 32 y.o. female who presents to the [address] [place] [address] for a visit for evaluation and treatment of Chronic diabetic  ulcer(s) of  Rt. foot hallux. The condition is of moderate severity. The ulcer has been present since 2022. The underlying cause is thought to be diabetes. The patients care to date has included regular wound care in [place] . The patient has significant underlying medical conditions as below. Hector Calderon is a 32 y.o. female who presents to the [address] [place] [address] for a visit for evaluation and treatment of Chronic diabetic  ulcer(s) of  Rt. foot hallux. The condition is of moderate severity. The ulcer has been present since 2022. The underlying cause is thought to be diabetes. The patients care to date has included regular wound care in [place] . The patient has significant underlying medical conditions as below. Patient down in size again this week and changed dressing as ordered this time  Patient little callus buildup and no issues this week      Foot care: advised to wash feet daily, pat dry and apply lotion at night, avoiding between toes. Need to look at feet daily and report to a physician any signs of inflammation or skin damage. Discussed diabetes shoes and socks. Different diabetic medications. Managing high and low sugar readings. Patient educated on the 6 essential components necessary for wound healing: Circulation, Debridements, Proper Dressings and Topical Wound Products, Infection Control, Edema Control and Offloading.      Patient educated on those factors that negatively effect or impact wound healing: smoking, obesity, uncontrolled diabetes, anticoagulant and immunosuppressive regimens, inadequate nutrition, untreated arterial and venous disease if applicable and measures to manage edema. Wound Pain Timing/Severity: none  Quality of pain: N/A  Severity of pain:  0 / 10  Modifying Factors: diabetes  Associated Signs/Symptoms: erythema, drainage, and numbness     Foot care: advised to wash feet daily, pat dry and apply lotion at night, avoiding between toes. Need to look at feet daily and report to a physician any signs of inflammation or skin damage. Discussed diabetes shoes and socks. Different diabetic medications. Managing high and low sugar readings. Patient educated on the 6 essential components necessary for wound healing: Circulation, Debridements, Proper Dressings and Topical Wound Products, Infection Control, Edema Control and Offloading. Patient educated on those factors that negatively effect or impact wound healing: smoking, obesity, uncontrolled diabetes, anticoagulant and immunosuppressive regimens, inadequate nutrition, untreated arterial and venous disease if applicable and measures to manage edema. Wound Pain Timing/Severity: none  Quality of pain: N/A  Severity of pain:  0 / 10  Modifying Factors: diabetes  Associated Signs/Symptoms: erythema, drainage, and numbness        PAST MEDICAL HISTORY        Diagnosis Date    Diabetes mellitus ([facility]        PAST SURGICAL HISTORY    History reviewed. No pertinent surgical history.     FAMILY HISTORY    Family History   Problem Relation Age of Onset    Diabetes Father     Heart Disease Father     Depression Maternal Grandmother     Depression Maternal Grandfather     Depression Paternal Grandmother     Depression Paternal Grandfather        SOCIAL HISTORY    Social History     Tobacco Use    Smoking status: Former     Types: Cigarettes     Quit date: 2013     Years since quittin.0    Smokeless tobacco: Never    Tobacco comments:     EDUCATED DM II SLOWS WOUND HEALING   Substance Use Topics    Alcohol use: No    Drug use: No       ALLERGIES    No Known Allergies    MEDICATIONS    Current Outpatient Medications on File Prior to Encounter   Medication Sig Dispense Refill    busPIRone (BUSPAR) 5 MG tablet Take 5 mg by mouth 3 times daily as needed      lisinopril (PRINIVIL;ZESTRIL) 5 MG tablet Take 5 mg by mouth daily      metoclopramide (REGLAN) 10 MG tablet Take 10 mg by mouth 3 times daily      Dulaglutide (TRULICITY) 4.5 MI/0.1BC SOPN Inject 4.5 mg into the skin once a week      Cholecalciferol (VITAMIN D3) 50 MCG (2000 UT) CAPS Take 1 capsule by mouth daily      budesonide-formoterol (SYMBICORT) 80-4.5 MCG/ACT AERO Inhale 2 puffs into the lungs 2 times daily      atorvastatin (LIPITOR) 10 MG tablet Take 10 mg by mouth daily      Norgestim-Eth Estrad Triphasic (TRI-ESTARYLLA PO) Take 1 tablet by mouth daily      Empagliflozin-metFORMIN HCl ER (SYNJARDY XR) 12.5-1000 MG TB24 Take 1 tablet by mouth 2 times daily      vitamin C (ASCORBIC ACID) 500 MG tablet Take 1,000 mg by mouth daily      zinc gluconate 50 MG tablet Take 50 mg by mouth daily      naproxen (NAPROSYN) 500 MG tablet Take 1 tablet by mouth 2 times daily as needed for Pain 20 tablet 0     No current facility-administered medications on file prior to encounter. REVIEW OF SYSTEMS    Pertinent items are noted in HPI. Constitutional: Negative for systemic symptoms including fever, chills and malaise. Objective:      /86   Pulse (!) 104   Temp (!) 96.6 °F (35.9 °C) (Temporal)   Resp 18     PHYSICAL EXAM      General: The patient is in no acute distress. Mental status:  Patient is appropriate, is  oriented to place and plan of care.   Dermatologic exam: Visual inspection of the periwound reveals the skin to be normal in turgor and texture, dry, and coarse  Wound exam: see wound description below in procedure note      Assessment:     Problem List Items Addressed This Visit          Endocrine WD-Diabetic ulcer of right great toe ([facility] - Primary    Relevant Orders    Initiate Outpatient Wound Care Protocol       Other    WD-Chronic ulcer of great toe of right foot with fat layer exposed ([facility]    Relevant Orders    Initiate Outpatient Wound Care Protocol     Procedure Note    Indications:  Based on my examination of this patient's wound(s) today, sharp excision into necrotic subcutaneous tissue is required to promote healing and evaluate the extent of previous healing. Performed by: CARMEN Adorno - CNP    Consent obtained: Yes    Time out taken:  Yes    Pain Control: 4% Lidocaine Liquid Topical        Debridement:Excisional Debridement    Using curette the wound(s) was/were sharply debrided down through and including the removal of subcutaneous tissue.         Devitalized Tissue Debrided:  fibrin, biofilm, slough, exudate, and callus    Pre Debridement Measurements:  Are located in the Wound Documentation Flow Sheet    All active wounds listed below with today's date are evaluated  Wound(s)    debrided this date include # : 1     Post  Debridement Measurements:  Wound 07/20/22 Toe (Comment  which one) Anterior;Right #1 GR (Active)   Wound Image   01/11/23 1332   Wound Etiology Diabetic 01/11/23 1332   Dressing Status New dressing applied 01/04/23 1404   Wound Cleansed Wound cleanser 01/11/23 1332   Offloading for Diabetic Foot Ulcers Forefoot offloading shoe 01/11/23 1332   Wound Length (cm) 0.6 cm 01/11/23 1332   Wound Width (cm) 0.3 cm 01/11/23 1332   Wound Depth (cm) 0.1 cm 01/11/23 1332   Wound Surface Area (cm^2) 0.18 cm^2 01/11/23 1332   Change in Wound Size % (l*w) 82 01/11/23 1332   Wound Volume (cm^3) 0.018 cm^3 01/11/23 1332   Wound Healing % 82 01/11/23 1332   Post-Procedure Length (cm) 0.6 cm 01/11/23 1356   Post-Procedure Width (cm) 0.3 cm 01/11/23 1356   Post-Procedure Depth (cm) 0.1 cm 01/11/23 1356   Post-Procedure Surface Area (cm^2) 0.18 cm^2 01/11/23 1356   Post-Procedure Volume (cm^3) 0.018 cm^3 01/11/23 1356   Distance Tunneling (cm) 0 cm 01/11/23 1332   Tunneling Position ___ O'Clock 0 01/11/23 1332   Undermining Starts ___ O'Clock 0 01/11/23 1332   Undermining Ends___ O'Clock 0 01/11/23 1332   Undermining Maxium Distance (cm) 0 01/11/23 1332   Wound Assessment Granulation tissue 01/11/23 1356   Drainage Amount Moderate 01/11/23 1356   Drainage Description Serosanguinous 01/11/23 1356   Odor None 01/11/23 1332   Annika-wound Assessment Fragile 01/11/23 1332   Margins Defined edges 01/11/23 1332   Wound Thickness Description not for Pressure Injury Full thickness 01/11/23 1332   Number of days: 175       Percent of Wound(s) Debrided: approximately 100%    Total  Area  Debrided:  0.18 sq cm     Bleeding:  Minimal    Hemostasis Achieved:  by pressure    Procedural Pain:  0  / 10     Post Procedural Pain:  0 / 10     Response to treatment:  Well tolerated by patient. Status of wound progress and description from last visit:   Improved. Continue plan of care for now and follow up 1 week  Hope to heal out again soon       Plan:       Discharge Instructions         [facility]     NOTE: Upon discharge from the [address], you will receive a patient experience survey. We would be grateful if you would take the time to fill this survey out. Wound care order history:                 MAX's   Right       Left               Date:              Cultures:  Right Great Toe cultured 11/30/22              Grafts:  Applied for grafts 11/9/22              Antibiotics:           Continuing wound care orders and information:                 Residence:                Continue home health care with:              Your wound-care supplies will be provided by: Wound cleansing:              Do not scrub or use excessive force. Wash hands with soap and water before and after dressing changes.               Prior to applying a clean dressing, cleanse wound with normal saline, wound cleanser, or mild soap and water. Ask the physician or nurse before getting the wound(s) wet in a shower     Daily Wound management:              Keep weight off wounds and reposition every 2 hours. Avoid standing for long periods of time. Apply wraps/stockings in AM and remove at bedtime. If swelling is present, elevate legs to the level of the heart or above for 30 minutes 4-5 times a day and/or when sitting. When taking antibiotics take entire prescription as ordered by physician do not stop taking until medicine is all gone. Wound Care Notes:   Possible cast next week 11/2/22  [facility] Replaced by [facility] for this week: 1/11/23     Right Great Toe cultured 11/30/22     Right Great Toe - Wash with soap and water, pat dry. Apply Anasept and Stimulen to wound bed. Cover with Ioplex (if available), otherwise use saline damp Hydrofera blue cut to size of wound. Cover with small piece of ABD. Wrap with Conform and secure with tape. Change daily. Follow up with Jaqueline Dominguez CNP in 1 week in the wound care center.   Call [phone] for any questions or concerns        Treatment Note      Written Patient Dismissal Instructions Given            Electronically signed by CARMEN Lockett CNP on 1/11/2023 at 1:58 PM

## 2023-01-20 ENCOUNTER — HOSPITAL ENCOUNTER (OUTPATIENT)
Dept: WOUND CARE | Age: 28
Discharge: HOME | End: 2023-01-20
Payer: COMMERCIAL

## 2023-01-20 VITALS
RESPIRATION RATE: 18 BRPM | HEART RATE: 108 BPM | TEMPERATURE: 96.8 F | DIASTOLIC BLOOD PRESSURE: 83 MMHG | SYSTOLIC BLOOD PRESSURE: 141 MMHG

## 2023-01-20 DIAGNOSIS — L97.519 DIABETIC ULCER OF RIGHT GREAT TOE (HCC): Primary | ICD-10-CM

## 2023-01-20 DIAGNOSIS — E11.621 DIABETIC ULCER OF RIGHT GREAT TOE (HCC): Primary | ICD-10-CM

## 2023-01-20 DIAGNOSIS — L97.512 CHRONIC ULCER OF GREAT TOE OF RIGHT FOOT WITH FAT LAYER EXPOSED (HCC): ICD-10-CM

## 2023-01-20 PROCEDURE — 11042 DBRDMT SUBQ TIS 1ST 20SQCM/<: CPT

## 2023-01-20 PROCEDURE — 6370000000 HC RX 637 (ALT 250 FOR IP): Performed by: NURSE PRACTITIONER

## 2023-01-20 RX ORDER — BACITRACIN, NEOMYCIN, POLYMYXIN B 400; 3.5; 5 [USP'U]/G; MG/G; [USP'U]/G
OINTMENT TOPICAL ONCE
OUTPATIENT
Start: 2023-01-20 | End: 2023-01-20

## 2023-01-20 RX ORDER — CLOBETASOL PROPIONATE 0.5 MG/G
OINTMENT TOPICAL ONCE
OUTPATIENT
Start: 2023-01-20 | End: 2023-01-20

## 2023-01-20 RX ORDER — BACITRACIN ZINC AND POLYMYXIN B SULFATE 500; 1000 [USP'U]/G; [USP'U]/G
OINTMENT TOPICAL ONCE
OUTPATIENT
Start: 2023-01-20 | End: 2023-01-20

## 2023-01-20 RX ORDER — GINSENG 100 MG
CAPSULE ORAL ONCE
OUTPATIENT
Start: 2023-01-20 | End: 2023-01-20

## 2023-01-20 RX ORDER — BETAMETHASONE DIPROPIONATE 0.05 %
OINTMENT (GRAM) TOPICAL ONCE
OUTPATIENT
Start: 2023-01-20 | End: 2023-01-20

## 2023-01-20 RX ORDER — LIDOCAINE 40 MG/G
CREAM TOPICAL ONCE
OUTPATIENT
Start: 2023-01-20 | End: 2023-01-20

## 2023-01-20 RX ORDER — GENTAMICIN SULFATE 1 MG/G
OINTMENT TOPICAL ONCE
OUTPATIENT
Start: 2023-01-20 | End: 2023-01-20

## 2023-01-20 RX ORDER — LIDOCAINE HYDROCHLORIDE 20 MG/ML
JELLY TOPICAL ONCE
OUTPATIENT
Start: 2023-01-20 | End: 2023-01-20

## 2023-01-20 RX ORDER — LIDOCAINE HYDROCHLORIDE 40 MG/ML
SOLUTION TOPICAL ONCE
OUTPATIENT
Start: 2023-01-20 | End: 2023-01-20

## 2023-01-20 RX ORDER — LIDOCAINE 50 MG/G
OINTMENT TOPICAL ONCE
OUTPATIENT
Start: 2023-01-20 | End: 2023-01-20

## 2023-01-20 RX ORDER — LIDOCAINE 50 MG/G
OINTMENT TOPICAL ONCE
Status: COMPLETED | OUTPATIENT
Start: 2023-01-20 | End: 2023-01-20

## 2023-01-20 RX ADMIN — LIDOCAINE: 50 OINTMENT TOPICAL at 13:32

## 2023-01-20 ASSESSMENT — PAIN DESCRIPTION - ORIENTATION: ORIENTATION: RIGHT

## 2023-01-20 ASSESSMENT — PAIN DESCRIPTION - DESCRIPTORS: DESCRIPTORS: TENDER

## 2023-01-20 ASSESSMENT — PAIN - FUNCTIONAL ASSESSMENT: PAIN_FUNCTIONAL_ASSESSMENT: PREVENTS OR INTERFERES SOME ACTIVE ACTIVITIES AND ADLS

## 2023-01-20 ASSESSMENT — PAIN DESCRIPTION - LOCATION: LOCATION: TOE (COMMENT WHICH ONE)

## 2023-01-20 ASSESSMENT — PAIN SCALES - GENERAL: PAINLEVEL_OUTOF10: 3

## 2023-01-20 NOTE — PROGRESS NOTES
Wound Care Center Progress Note With Procedure    Zenon Dancer  AGE: 32 y.o. GENDER: female  : 1995  EPISODE DATE:  2023     Subjective:     Chief Complaint   Patient presents with    Wound Check     Right foot          HISTORY of PRESENT ILLNESS     Zenon Dancer is a 32 y.o. female who presents to the [address] [place] [address] for a visit for evaluation and treatment of Chronic diabetic  ulcer(s) of  Rt. foot hallux. The condition is of moderate severity. The ulcer has been present since 2022. The underlying cause is thought to be diabetes. The patients care to date has included regular wound care in [place] . The patient has significant underlying medical conditions as below. Patient improved in size again and close to healed  About half the size  She has been changing dressings as ordered  No other issues reported. Hope to discharge again after closing     Foot care: advised to wash feet daily, pat dry and apply lotion at night, avoiding between toes. Need to look at feet daily and report to a physician any signs of inflammation or skin damage. Discussed diabetes shoes and socks. Different diabetic medications. Managing high and low sugar readings. Patient educated on those factors that negatively effect or impact wound healing: smoking, obesity, uncontrolled diabetes, anticoagulant and immunosuppressive regimens, inadequate nutrition, untreated arterial and venous disease if applicable and measures to manage edema. Patient educated on the 6 essential components necessary for wound healing: Circulation, Debridements, Proper Dressings and Topical Wound Products, Infection Control, Edema Control and Offloading.      Wound Pain Timing/Severity: none  Quality of pain: N/A  Severity of pain:  0 / 10  Modifying Factors: diabetes  Associated Signs/Symptoms: erythema, drainage, and numbness        PAST MEDICAL HISTORY        Diagnosis Date    Diabetes mellitus ([address] PAST SURGICAL HISTORY    History reviewed. No pertinent surgical history. FAMILY HISTORY    Family History   Problem Relation Age of Onset    Diabetes Father     Heart Disease Father     Depression Maternal Grandmother     Depression Maternal Grandfather     Depression Paternal Grandmother     Depression Paternal Grandfather        SOCIAL HISTORY    Social History     Tobacco Use    Smoking status: Former     Types: Cigarettes     Quit date: 2013     Years since quittin.0    Smokeless tobacco: Never    Tobacco comments:     EDUCATED DM II SLOWS WOUND HEALING   Substance Use Topics    Alcohol use: No    Drug use: No       ALLERGIES    No Known Allergies    MEDICATIONS    Current Outpatient Medications on File Prior to Encounter   Medication Sig Dispense Refill    busPIRone (BUSPAR) 5 MG tablet Take 5 mg by mouth 3 times daily as needed      lisinopril (PRINIVIL;ZESTRIL) 5 MG tablet Take 5 mg by mouth daily      metoclopramide (REGLAN) 10 MG tablet Take 10 mg by mouth 3 times daily      Dulaglutide (TRULICITY) 4.5 FD/2.5PG SOPN Inject 4.5 mg into the skin once a week      Cholecalciferol (VITAMIN D3) 50 MCG (2000 UT) CAPS Take 1 capsule by mouth daily      budesonide-formoterol (SYMBICORT) 80-4.5 MCG/ACT AERO Inhale 2 puffs into the lungs 2 times daily      atorvastatin (LIPITOR) 10 MG tablet Take 10 mg by mouth daily      Norgestim-Eth Estrad Triphasic (TRI-ESTARYLLA PO) Take 1 tablet by mouth daily      Empagliflozin-metFORMIN HCl ER (SYNJARDY XR) 12.5-1000 MG TB24 Take 1 tablet by mouth 2 times daily      vitamin C (ASCORBIC ACID) 500 MG tablet Take 1,000 mg by mouth daily      zinc gluconate 50 MG tablet Take 50 mg by mouth daily      naproxen (NAPROSYN) 500 MG tablet Take 1 tablet by mouth 2 times daily as needed for Pain 20 tablet 0     No current facility-administered medications on file prior to encounter. REVIEW OF SYSTEMS    Pertinent items are noted in HPI.     Constitutional: Negative for systemic symptoms including fever, chills and malaise. Objective:      BP (!) 141/83   Pulse (!) 108   Temp 96.8 °F (36 °C) (Temporal)   Resp 18     PHYSICAL EXAM      General: The patient is in no acute distress. Mental status:  Patient is appropriate, is  oriented to place and plan of care. Dermatologic exam: Visual inspection of the periwound reveals the skin to be normal in turgor and texture, dry, coarse, and scaly  Wound exam: see wound description below in procedure note      Assessment:     Problem List Items Addressed This Visit          Endocrine    WD-Diabetic ulcer of right great toe ([address] - Primary    Relevant Orders    Initiate Outpatient Wound Care Protocol       Other    WD-Chronic ulcer of great toe of right foot with fat layer exposed ([address]    Relevant Orders    Initiate Outpatient Wound Care Protocol     Procedure Note    Indications:  Based on my examination of this patient's wound(s) today, sharp excision into necrotic subcutaneous tissue is required to promote healing and evaluate the extent of previous healing. Performed by: CARMEN Foster CNP    Consent obtained: Yes    Time out taken:  Yes    Pain Control: N/a      Debridement:Excisional Debridement    Using curette the wound(s) was/were sharply debrided down through and including the removal of subcutaneous tissue.         Devitalized Tissue Debrided:  fibrin, biofilm, slough, exudate, and callus    Pre Debridement Measurements:  Are located in the Wound Documentation Flow Sheet    All active wounds listed below with today's date are evaluated  Wound(s)    debrided this date include # : 1     Post  Debridement Measurements:  Wound 07/20/22 Toe (Comment  which one) Anterior;Right #1 GR (Active)   Wound Image   01/11/23 1332   Wound Etiology Diabetic 01/20/23 1331   Dressing Status New dressing applied 01/20/23 1352   Wound Cleansed Wound cleanser 01/20/23 1331   Offloading for Diabetic Foot Ulcers Forefoot offloading shoe 01/20/23 1352   Wound Length (cm) 0.2 cm 01/20/23 1331   Wound Width (cm) 0.2 cm 01/20/23 1331   Wound Depth (cm) 0.1 cm 01/20/23 1331   Wound Surface Area (cm^2) 0.04 cm^2 01/20/23 1331   Change in Wound Size % (l*w) 96 01/20/23 1331   Wound Volume (cm^3) 0.004 cm^3 01/20/23 1331   Wound Healing % 96 01/20/23 1331   Post-Procedure Length (cm) 0.2 cm 01/20/23 1341   Post-Procedure Width (cm) 0.2 cm 01/20/23 1341   Post-Procedure Depth (cm) 0.1 cm 01/20/23 1341   Post-Procedure Surface Area (cm^2) 0.04 cm^2 01/20/23 1341   Post-Procedure Volume (cm^3) 0.004 cm^3 01/20/23 1341   Distance Tunneling (cm) 0 cm 01/20/23 1331   Tunneling Position ___ O'Clock 00 01/20/23 1331   Undermining Starts ___ O'Clock 0 01/20/23 1331   Undermining Ends___ O'Clock 0 01/20/23 1331   Undermining Maxium Distance (cm) 0 01/20/23 1331   Wound Assessment Granulation tissue 01/20/23 1331   Drainage Amount Scant 01/20/23 1331   Drainage Description Serosanguinous 01/20/23 1331   Odor None 01/20/23 1331   Annika-wound Assessment Fragile 01/20/23 1331   Margins Defined edges 01/20/23 1331   Wound Thickness Description not for Pressure Injury Full thickness 01/20/23 1331   Number of days: 184       Percent of Wound(s) Debrided: approximately 100%    Total  Area  Debrided:  0.04 sq cm     Bleeding:  Minimal    Hemostasis Achieved:  by pressure    Procedural Pain:  0  / 10     Post Procedural Pain:  0 / 10     Response to treatment:  Well tolerated by patient. Status of wound progress and description from last visit:   Improved and hope to discharge soon. Follow up 1 week and continue plan of care for now       Plan:       Discharge Instructions         [address]     NOTE: Upon discharge from the [address], you will receive a patient experience survey. We would be grateful if you would take the time to fill this survey out.      Wound care order history:                 MAX's   Right Left               Date:              Cultures:  Right Great Toe cultured 11/30/22              Grafts:  Applied for grafts 11/9/22              Antibiotics:           Continuing wound care orders and information:                 Residence:                Continue home health care with:              Your wound-care supplies will be provided by: Wound cleansing:              Do not scrub or use excessive force. Wash hands with soap and water before and after dressing changes. Prior to applying a clean dressing, cleanse wound with normal saline, wound cleanser, or mild soap and water. Ask the physician or nurse before getting the wound(s) wet in a shower     Daily Wound management:              Keep weight off wounds and reposition every 2 hours. Avoid standing for long periods of time. Apply wraps/stockings in AM and remove at bedtime. If swelling is present, elevate legs to the level of the heart or above for 30 minutes 4-5 times a day and/or when sitting. When taking antibiotics take entire prescription as ordered by physician do not stop taking until medicine is all gone. Wound Care Notes:   Possible cast next week 11/2/22  [facility] [address] [place] [address] in [facility] for this week: 1/20/23     Right Great Toe cultured 11/30/22     Right Great Toe - Wash with soap and water, pat dry. Apply Anasept and Stimulen to wound bed. Cover with Ioplex (if available), otherwise use saline damp Hydrofera blue cut to size of wound. Cover with small piece of ABD. Wrap with Conform and secure with tape. Change daily. Follow up with Raghu Moreno CNP in 1 week in the wound care center.   Call [phone] for any questions or concerns        Treatment Note Wound 07/20/22 Toe (Comment  which one) Anterior;Right #1 GR-Dressing/Treatment: (Anasept, Stimulen, Ioplex, ABD, Conform, Coban)    Written Patient Dismissal Instructions Given            Electronically signed by CARMEN Newman CNP on 1/20/2023 at 1:54 PM

## 2023-01-20 NOTE — DISCHARGE INSTRUCTIONS
PHYSICIAN ORDERS AND DISCHARGE INSTRUCTIONS     NOTE: Upon discharge from the [address], you will receive a patient experience survey. We would be grateful if you would take the time to fill this survey out. Wound care order history:                 MAX's   Right       Left               Date:              Cultures:  Right Great Toe cultured 11/30/22              Grafts:  Applied for grafts 11/9/22              Antibiotics:           Continuing wound care orders and information:                 Residence:                Continue home health care with:              Your wound-care supplies will be provided by: Wound cleansing:              Do not scrub or use excessive force. Wash hands with soap and water before and after dressing changes. Prior to applying a clean dressing, cleanse wound with normal saline, wound cleanser, or mild soap and water. Ask the physician or nurse before getting the wound(s) wet in a shower     Daily Wound management:              Keep weight off wounds and reposition every 2 hours. Avoid standing for long periods of time. Apply wraps/stockings in AM and remove at bedtime. If swelling is present, elevate legs to the level of the heart or above for 30 minutes 4-5 times a day and/or when sitting. When taking antibiotics take entire prescription as ordered by physician do not stop taking until medicine is all gone. Wound Care Notes:   Possible cast next week 11/2/22  [facility] [address] for this week: 1/20/23     Right Great Toe cultured 11/30/22     Right Great Toe - Wash with soap and water, pat dry. Apply Anasept and Stimulen to wound bed. Cover with Ioplex (if available), otherwise use saline damp Hydrofera blue cut to size of wound. Cover with small piece of ABD.   Wrap with Conform and secure with tape.  Change daily.        Follow up with Roman Ramos CNP in 1 week in the wound care center.  Call  for any questions or concerns

## 2023-01-25 ENCOUNTER — HOSPITAL ENCOUNTER (OUTPATIENT)
Dept: WOUND CARE | Age: 28
Discharge: HOME | End: 2023-01-25
Payer: COMMERCIAL

## 2023-01-25 VITALS
TEMPERATURE: 95.3 F | RESPIRATION RATE: 18 BRPM | HEART RATE: 108 BPM | SYSTOLIC BLOOD PRESSURE: 125 MMHG | DIASTOLIC BLOOD PRESSURE: 85 MMHG

## 2023-01-25 DIAGNOSIS — L97.519 DIABETIC ULCER OF RIGHT GREAT TOE (HCC): ICD-10-CM

## 2023-01-25 DIAGNOSIS — E11.621 DIABETIC ULCER OF RIGHT GREAT TOE (HCC): ICD-10-CM

## 2023-01-25 DIAGNOSIS — L97.512 CHRONIC ULCER OF GREAT TOE OF RIGHT FOOT WITH FAT LAYER EXPOSED (HCC): Primary | ICD-10-CM

## 2023-01-25 PROCEDURE — 6370000000 HC RX 637 (ALT 250 FOR IP): Performed by: NURSE PRACTITIONER

## 2023-01-25 PROCEDURE — 11042 DBRDMT SUBQ TIS 1ST 20SQCM/<: CPT | Performed by: NURSE PRACTITIONER

## 2023-01-25 PROCEDURE — 11042 DBRDMT SUBQ TIS 1ST 20SQCM/<: CPT

## 2023-01-25 RX ORDER — LIDOCAINE HYDROCHLORIDE 20 MG/ML
JELLY TOPICAL ONCE
OUTPATIENT
Start: 2023-01-25 | End: 2023-01-25

## 2023-01-25 RX ORDER — GINSENG 100 MG
CAPSULE ORAL ONCE
OUTPATIENT
Start: 2023-01-25 | End: 2023-01-25

## 2023-01-25 RX ORDER — LIDOCAINE 40 MG/G
CREAM TOPICAL ONCE
OUTPATIENT
Start: 2023-01-25 | End: 2023-01-25

## 2023-01-25 RX ORDER — LIDOCAINE 50 MG/G
OINTMENT TOPICAL ONCE
OUTPATIENT
Start: 2023-01-25 | End: 2023-01-25

## 2023-01-25 RX ORDER — LIDOCAINE HYDROCHLORIDE 40 MG/ML
SOLUTION TOPICAL ONCE
OUTPATIENT
Start: 2023-01-25 | End: 2023-01-25

## 2023-01-25 RX ORDER — BETAMETHASONE DIPROPIONATE 0.05 %
OINTMENT (GRAM) TOPICAL ONCE
OUTPATIENT
Start: 2023-01-25 | End: 2023-01-25

## 2023-01-25 RX ORDER — GENTAMICIN SULFATE 1 MG/G
OINTMENT TOPICAL ONCE
OUTPATIENT
Start: 2023-01-25 | End: 2023-01-25

## 2023-01-25 RX ORDER — BACITRACIN, NEOMYCIN, POLYMYXIN B 400; 3.5; 5 [USP'U]/G; MG/G; [USP'U]/G
OINTMENT TOPICAL ONCE
OUTPATIENT
Start: 2023-01-25 | End: 2023-01-25

## 2023-01-25 RX ORDER — CLOBETASOL PROPIONATE 0.5 MG/G
OINTMENT TOPICAL ONCE
OUTPATIENT
Start: 2023-01-25 | End: 2023-01-25

## 2023-01-25 RX ORDER — LIDOCAINE 50 MG/G
OINTMENT TOPICAL ONCE
Status: COMPLETED | OUTPATIENT
Start: 2023-01-25 | End: 2023-01-25

## 2023-01-25 RX ORDER — BACITRACIN ZINC AND POLYMYXIN B SULFATE 500; 1000 [USP'U]/G; [USP'U]/G
OINTMENT TOPICAL ONCE
OUTPATIENT
Start: 2023-01-25 | End: 2023-01-25

## 2023-01-25 RX ADMIN — LIDOCAINE: 50 OINTMENT TOPICAL at 13:42

## 2023-01-25 ASSESSMENT — PAIN DESCRIPTION - LOCATION: LOCATION: TOE (COMMENT WHICH ONE)

## 2023-01-25 ASSESSMENT — PAIN SCALES - GENERAL: PAINLEVEL_OUTOF10: 5

## 2023-01-25 ASSESSMENT — PAIN DESCRIPTION - ORIENTATION: ORIENTATION: RIGHT

## 2023-01-25 ASSESSMENT — PAIN DESCRIPTION - FREQUENCY: FREQUENCY: INTERMITTENT

## 2023-01-25 ASSESSMENT — PAIN DESCRIPTION - PAIN TYPE: TYPE: ACUTE PAIN

## 2023-01-25 ASSESSMENT — PAIN DESCRIPTION - ONSET: ONSET: ON-GOING

## 2023-01-25 ASSESSMENT — PAIN - FUNCTIONAL ASSESSMENT: PAIN_FUNCTIONAL_ASSESSMENT: ACTIVITIES ARE NOT PREVENTED

## 2023-01-25 NOTE — PROGRESS NOTES
Wound Care Center Progress Note With Procedure    Ayesha Park  AGE: 32 y.o. GENDER: female  : 1995  EPISODE DATE:  2023     Subjective:     Chief Complaint   Patient presents with    Wound Check     Right Great Toe         HISTORY of PRESENT ILLNESS     Ayesha Park is a 32 y.o. female who presents to the [address] [place] [address] for a visit for evaluation and treatment of Chronic diabetic  ulcer(s) of  Rt. foot hallux. The condition is of moderate severity. The ulcer has been present since 2022. The underlying cause is thought to be diabetes. The patients care to date has included regular wound care in [place] . The patient has significant underlying medical conditions as below. 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 Will talk to her about applying TCC     Foot care: advised to wash feet daily, pat dry and apply lotion at night, avoiding between toes. Need to look at feet daily and report to a physician any signs of inflammation or skin damage. Discussed diabetes shoes and socks. Different diabetic medications. Managing high and low sugar readings. Patient educated on those factors that negatively effect or impact wound healing: smoking, obesity, uncontrolled diabetes, anticoagulant and immunosuppressive regimens, inadequate nutrition, untreated arterial and venous disease if applicable and measures to manage edema. Patient educated on the 6 essential components necessary for wound healing: Circulation, Debridements, Proper Dressings and Topical Wound Products, Infection Control, Edema Control and Offloading. Wound Pain Timing/Severity: none  Quality of pain: N/A  Severity of pain:  0 / 10  Modifying Factors: diabetes  Associated Signs/Symptoms: erythema, drainage, and numbness        PAST MEDICAL HISTORY        Diagnosis Date    Diabetes mellitus ([facility] [address]        PAST SURGICAL HISTORY    History reviewed. No pertinent surgical history. FAMILY HISTORY    Family History   Problem Relation Age of Onset    Diabetes Father     Heart Disease Father     Depression Maternal Grandmother     Depression Maternal Grandfather     Depression Paternal Grandmother     Depression Paternal Grandfather        SOCIAL HISTORY    Social History     Tobacco Use    Smoking status: Former     Types: Cigarettes     Quit date: 2013     Years since quittin.1    Smokeless tobacco: Never    Tobacco comments:     EDUCATED DM II SLOWS WOUND HEALING   Substance Use Topics    Alcohol use: No    Drug use: No       ALLERGIES    No Known Allergies    MEDICATIONS    Current Outpatient Medications on File Prior to Encounter   Medication Sig Dispense Refill    busPIRone (BUSPAR) 5 MG tablet Take 5 mg by mouth 3 times daily as needed      lisinopril (PRINIVIL;ZESTRIL) 5 MG tablet Take 5 mg by mouth daily      metoclopramide (REGLAN) 10 MG tablet Take 10 mg by mouth 3 times daily      Dulaglutide (TRULICITY) 4.5 GX/2.4FS SOPN Inject 4.5 mg into the skin once a week      Cholecalciferol (VITAMIN D3) 50 MCG (2000 UT) CAPS Take 1 capsule by mouth daily      budesonide-formoterol (SYMBICORT) 80-4.5 MCG/ACT AERO Inhale 2 puffs into the lungs 2 times daily      atorvastatin (LIPITOR) 10 MG tablet Take 10 mg by mouth daily      Norgestim-Eth Estrad Triphasic (TRI-ESTARYLLA PO) Take 1 tablet by mouth daily      Empagliflozin-metFORMIN HCl ER (SYNJARDY XR) 12.5-1000 MG TB24 Take 1 tablet by mouth 2 times daily      vitamin C (ASCORBIC ACID) 500 MG tablet Take 1,000 mg by mouth daily      zinc gluconate 50 MG tablet Take 50 mg by mouth daily      naproxen (NAPROSYN) 500 MG tablet Take 1 tablet by mouth 2 times daily as needed for Pain 20 tablet 0     No current facility-administered medications on file prior to encounter. REVIEW OF SYSTEMS    Pertinent items are noted in HPI.     Constitutional: Negative for systemic symptoms including fever, chills and malaise. Objective:      /85   Pulse (!) 108   Temp (!) 95.3 °F (35.2 °C) (Temporal)   Resp 18     PHYSICAL EXAM      General: The patient is in no acute distress. Mental status:  Patient is appropriate, is  oriented to place and plan of care. Dermatologic exam: Visual inspection of the periwound reveals the skin to be normal in turgor and texture, dry, and coarse  Wound exam: see wound description below in procedure note      Assessment:     Problem List Items Addressed This Visit          Endocrine    WD-Diabetic ulcer of right great toe [facility])    Relevant Orders    Initiate Outpatient Wound Care Protocol       Other    WD-Chronic ulcer of great toe of right foot with fat layer exposed ([facility] [address] - Primary    Relevant Orders    Initiate Outpatient Wound Care Protocol     Procedure Note    Indications:  Based on my examination of this patient's wound(s) today, sharp excision into necrotic subcutaneous tissue is required to promote healing and evaluate the extent of previous healing. Performed by: CARMEN Victoria - JESICA    Consent obtained: Yes    Time out taken:  Yes    Pain Control: Anesthetic  Anesthetic: 5% Lidocaine Ointment Topical     Debridement:Excisional Debridement    Using curette the wound(s) was/were sharply debrided down through and including the removal of subcutaneous tissue.         Devitalized Tissue Debrided:  fibrin, biofilm, slough, exudate, and callus    Pre Debridement Measurements:  Are located in the Wound Documentation Flow Sheet    All active wounds listed below with today's date are evaluated  Wound(s)    debrided this date include # : 1     Post  Debridement Measurements:  Wound 07/20/22 Toe (Comment  which one) Anterior;Right #1 GR (Active)   Wound Image   01/25/23 1339   Wound Etiology Diabetic 01/25/23 1339   Dressing Status New dressing applied 01/20/23 1352   Wound Cleansed Wound cleanser 01/25/23 1339   Offloading for Diabetic Foot Ulcers Forefoot offloading shoe 01/20/23 1352   Wound Length (cm) 0.6 cm 01/25/23 1339   Wound Width (cm) 0.5 cm 01/25/23 1339   Wound Depth (cm) 0.1 cm 01/25/23 1339   Wound Surface Area (cm^2) 0.3 cm^2 01/25/23 1339   Change in Wound Size % (l*w) 70 01/25/23 1339   Wound Volume (cm^3) 0.03 cm^3 01/25/23 1339   Wound Healing % 70 01/25/23 1339   Post-Procedure Length (cm) 0.6 cm 01/25/23 1354   Post-Procedure Width (cm) 0.5 cm 01/25/23 1354   Post-Procedure Depth (cm) 0.1 cm 01/25/23 1354   Post-Procedure Surface Area (cm^2) 0.3 cm^2 01/25/23 1354   Post-Procedure Volume (cm^3) 0.03 cm^3 01/25/23 1354   Distance Tunneling (cm) 0 cm 01/25/23 1339   Tunneling Position ___ O'Clock 0 01/25/23 1339   Undermining Starts ___ O'Clock 0 01/25/23 1339   Undermining Ends___ O'Clock 0 01/25/23 1339   Undermining Maxium Distance (cm) 0 01/25/23 1339   Wound Assessment Pink/red;Pale granulation tissue 01/25/23 1339   Drainage Amount Scant 01/25/23 1339   Drainage Description [place] 01/25/23 1339   Odor None 01/25/23 1339   Annika-wound Assessment Hyperkeratosis (callous) 01/25/23 1339   Margins Defined edges 01/25/23 1339   Wound Thickness Description not for Pressure Injury Full thickness 01/25/23 1339   Number of days: 189       Percent of Wound(s) Debrided: approximately 100%    Total  Area  Debrided:  0.3 sq cm     Bleeding:  Minimal    Hemostasis Achieved:  by pressure    Procedural Pain:  0  / 10     Post Procedural Pain:  0 / 10     Response to treatment:  Well tolerated by patient. Status of wound progress and description from last visit:   Stable and clean. Application of cast:    With the indications of casting being present and no contraindications, information and  instructions were given to the patient and the patient consented to the treatment. After proper wound care and appropriate padding. A total contact cast was applied according to protocol. The patient tolerated the procedure well.        Plan:       Discharge Instructions PHYSICIAN ORDERS AND DISCHARGE INSTRUCTIONS     NOTE: Upon discharge from the [address], you will receive a patient experience survey. We would be grateful if you would take the time to fill this survey out. Wound care order history:                 MAX's   Right       Left               Date:              Cultures:  Right Great Toe cultured 11/30/22              Grafts:  Applied for grafts 11/9/22              Antibiotics:           Continuing wound care orders and information:                 Residence:                Continue home health care with:              Your wound-care supplies will be provided by: Wound cleansing:              Do not scrub or use excessive force. Wash hands with soap and water before and after dressing changes. Prior to applying a clean dressing, cleanse wound with normal saline, wound cleanser, or mild soap and water. Ask the physician or nurse before getting the wound(s) wet in a shower     Daily Wound management:              Keep weight off wounds and reposition every 2 hours. Avoid standing for long periods of time. Apply wraps/stockings in AM and remove at bedtime. If swelling is present, elevate legs to the level of the heart or above for 30 minutes 4-5 times a day and/or when sitting. When taking antibiotics take entire prescription as ordered by physician do not stop taking until medicine is all gone. Wound Care Notes:   Possible cast next week 11/2/22  [facility] [address] [facility] [address] for this week: 1/25/23     Right Great Toe cultured 11/30/22     Right Great Toe - Wash with soap and water, pat dry. Apply Stimulen to wound bed. Cover with Ioplex  Secure with conform. TCC. Leave in place for 1 week.         Follow up with Shanda Knox CNP in 1 week in the wound care [place].   Call [phone] for any questions or concerns        Treatment Note      Written Patient Dismissal Instructions Given            Electronically signed by CARMEN Lynn CNP on 1/25/2023 at 1:59 PM

## 2023-01-25 NOTE — DISCHARGE INSTRUCTIONS
PHYSICIAN ORDERS AND DISCHARGE INSTRUCTIONS     NOTE: Upon discharge from the [address], you will receive a patient experience survey. We would be grateful if you would take the time to fill this survey out. Wound care order history:                 MAX's   Right       Left               Date:              Cultures:  Right Great Toe cultured 11/30/22              Grafts:  Applied for grafts 11/9/22              Antibiotics:           Continuing wound care orders and information:                 Residence:                Continue home health care with:              Your wound-care supplies will be provided by: Wound cleansing:              Do not scrub or use excessive force. Wash hands with soap and water before and after dressing changes. Prior to applying a clean dressing, cleanse wound with normal saline, wound cleanser, or mild soap and water. Ask the physician or nurse before getting the wound(s) wet in a shower     Daily Wound management:              Keep weight off wounds and reposition every 2 hours. Avoid standing for long periods of time. Apply wraps/stockings in AM and remove at bedtime. If swelling is present, elevate legs to the level of the heart or above for 30 minutes 4-5 times a day and/or when sitting. When taking antibiotics take entire prescription as ordered by physician do not stop taking until medicine is all gone. Wound Care Notes:   Possible cast next week 11/2/22  [facility] [facility] [address] for this week: 1/25/23     Right Great Toe cultured 11/30/22     Right Great Toe - Wash with soap and water, pat dry. Apply Stimulen to wound bed. Cover with Ioplex  Secure with conform. TCC. Leave in place for 1 week. Follow up with Perlita Barnhart CNP in 1 week in the wound care center.   Call [phone] for any questions or concerns

## 2023-02-01 ENCOUNTER — HOSPITAL ENCOUNTER (OUTPATIENT)
Dept: WOUND CARE | Age: 28
Discharge: HOME | End: 2023-02-01
Payer: COMMERCIAL

## 2023-02-01 VITALS
RESPIRATION RATE: 18 BRPM | SYSTOLIC BLOOD PRESSURE: 127 MMHG | DIASTOLIC BLOOD PRESSURE: 74 MMHG | HEART RATE: 91 BPM | TEMPERATURE: 97.8 F

## 2023-02-01 DIAGNOSIS — L97.512 CHRONIC ULCER OF GREAT TOE OF RIGHT FOOT WITH FAT LAYER EXPOSED (HCC): Primary | ICD-10-CM

## 2023-02-01 DIAGNOSIS — E11.621 DIABETIC ULCER OF RIGHT GREAT TOE (HCC): ICD-10-CM

## 2023-02-01 DIAGNOSIS — L97.519 DIABETIC ULCER OF RIGHT GREAT TOE (HCC): ICD-10-CM

## 2023-02-01 PROCEDURE — 6370000000 HC RX 637 (ALT 250 FOR IP): Performed by: NURSE PRACTITIONER

## 2023-02-01 PROCEDURE — 11042 DBRDMT SUBQ TIS 1ST 20SQCM/<: CPT

## 2023-02-01 RX ORDER — BETAMETHASONE DIPROPIONATE 0.05 %
OINTMENT (GRAM) TOPICAL ONCE
OUTPATIENT
Start: 2023-02-01 | End: 2023-02-01

## 2023-02-01 RX ORDER — LIDOCAINE 40 MG/G
CREAM TOPICAL ONCE
OUTPATIENT
Start: 2023-02-01 | End: 2023-02-01

## 2023-02-01 RX ORDER — CLOBETASOL PROPIONATE 0.5 MG/G
OINTMENT TOPICAL ONCE
OUTPATIENT
Start: 2023-02-01 | End: 2023-02-01

## 2023-02-01 RX ORDER — BACITRACIN, NEOMYCIN, POLYMYXIN B 400; 3.5; 5 [USP'U]/G; MG/G; [USP'U]/G
OINTMENT TOPICAL ONCE
OUTPATIENT
Start: 2023-02-01 | End: 2023-02-01

## 2023-02-01 RX ORDER — LIDOCAINE HYDROCHLORIDE 40 MG/ML
SOLUTION TOPICAL ONCE
OUTPATIENT
Start: 2023-02-01 | End: 2023-02-01

## 2023-02-01 RX ORDER — BACITRACIN ZINC AND POLYMYXIN B SULFATE 500; 1000 [USP'U]/G; [USP'U]/G
OINTMENT TOPICAL ONCE
OUTPATIENT
Start: 2023-02-01 | End: 2023-02-01

## 2023-02-01 RX ORDER — LIDOCAINE 50 MG/G
OINTMENT TOPICAL ONCE
OUTPATIENT
Start: 2023-02-01 | End: 2023-02-01

## 2023-02-01 RX ORDER — GINSENG 100 MG
CAPSULE ORAL ONCE
OUTPATIENT
Start: 2023-02-01 | End: 2023-02-01

## 2023-02-01 RX ORDER — LIDOCAINE 50 MG/G
OINTMENT TOPICAL ONCE
Status: COMPLETED | OUTPATIENT
Start: 2023-02-01 | End: 2023-02-01

## 2023-02-01 RX ORDER — GENTAMICIN SULFATE 1 MG/G
OINTMENT TOPICAL ONCE
OUTPATIENT
Start: 2023-02-01 | End: 2023-02-01

## 2023-02-01 RX ORDER — LIDOCAINE HYDROCHLORIDE 20 MG/ML
JELLY TOPICAL ONCE
OUTPATIENT
Start: 2023-02-01 | End: 2023-02-01

## 2023-02-01 RX ORDER — TIRZEPATIDE 2.5 MG/.5ML
2.5 INJECTION, SOLUTION SUBCUTANEOUS WEEKLY
COMMUNITY

## 2023-02-01 RX ADMIN — LIDOCAINE: 50 OINTMENT TOPICAL at 13:51

## 2023-02-01 ASSESSMENT — PAIN DESCRIPTION - ORIENTATION: ORIENTATION: RIGHT

## 2023-02-01 ASSESSMENT — PAIN SCALES - GENERAL: PAINLEVEL_OUTOF10: 7

## 2023-02-01 ASSESSMENT — PAIN - FUNCTIONAL ASSESSMENT: PAIN_FUNCTIONAL_ASSESSMENT: PREVENTS OR INTERFERES SOME ACTIVE ACTIVITIES AND ADLS

## 2023-02-01 ASSESSMENT — PAIN DESCRIPTION - LOCATION: LOCATION: TOE (COMMENT WHICH ONE)

## 2023-02-01 ASSESSMENT — PAIN DESCRIPTION - DESCRIPTORS: DESCRIPTORS: STABBING;SHOOTING

## 2023-02-01 ASSESSMENT — PAIN DESCRIPTION - ONSET: ONSET: ON-GOING

## 2023-02-01 ASSESSMENT — PAIN DESCRIPTION - FREQUENCY: FREQUENCY: CONTINUOUS

## 2023-02-01 ASSESSMENT — PAIN DESCRIPTION - PAIN TYPE: TYPE: ACUTE PAIN

## 2023-02-01 NOTE — PROGRESS NOTES
Contact Cast    NAME:  Katlyn Ruiz  YOB: 1995  MEDICAL RECORD NUMBER:  [id number]  DATE:  2/1/2023    Goal:  Patient will maintain integrity of cast, avoid mobility hazards, and report complications that may occur (foul odor, pain, numbness, cracked cast). [x] Removed old contact cast if indicated and wash extremity with soap and water. [x] Applied ordered dressing. Applied per Timothy Cardona CNP. Applied to Right lower extremity. [x] Allow cast to dry. [x] Instructed patient to report to health care provider, including wound care center, any back pain, hip pain, or leg pain, numbness of toes, or any odor  coming from the cast.   [x] Instructed patient not to stick any foreign objects down into cast.   [x] Instructed patient to utilize assistive devices(crutches, cane or walker) as ordered   [x] Instructed patient to continue offloading as directed.      Applied cast per  Guidelines    Electronically signed by Rachael Villarreal LPN on 2/2/9564 at 5:07 PM

## 2023-02-01 NOTE — PROGRESS NOTES
Wound Care Center Progress Note With Procedure    Rah Pereyra  AGE: 32 y.o. GENDER: female  : 1995  EPISODE DATE:  2023     Subjective:     Chief Complaint   Patient presents with    Wound Check     Right foot         HISTORY of PRESENT ILLNESS        Rah Pereyra is a 32 y.o. female who presents to the [address] [place] [address] for a visit for evaluation and treatment of Chronic diabetic  ulcer(s) of  Rt. foot hallux. The condition is of moderate severity. The ulcer has been present since 2022. The underlying cause is thought to be diabetes. The patients care to date has included regular wound care in [place] . The patient has significant underlying medical conditions as below. Patient tolerated TCC this week and wound is smaller and healthier wound bed  We will cast again today. Foot care: advised to wash feet daily, pat dry and apply lotion at night, avoiding between toes. Need to look at feet daily and report to a physician any signs of inflammation or skin damage. Discussed diabetes shoes and socks. Different diabetic medications. Managing high and low sugar readings. Patient educated on those factors that negatively effect or impact wound healing: smoking, obesity, uncontrolled diabetes, anticoagulant and immunosuppressive regimens, inadequate nutrition, untreated arterial and venous disease if applicable and measures to manage edema. Patient educated on the 6 essential components necessary for wound healing: Circulation, Debridements, Proper Dressings and Topical Wound Products, Infection Control, Edema Control and Offloading. Wound Pain Timing/Severity: none  Quality of pain: N/A  Severity of pain:  0 / 10  Modifying Factors: diabetes  Associated Signs/Symptoms: erythema, drainage, and numbness        PAST MEDICAL HISTORY        Diagnosis Date    Diabetes mellitus ([address]        PAST SURGICAL HISTORY    History reviewed.  No pertinent surgical history. FAMILY HISTORY    Family History   Problem Relation Age of Onset    Diabetes Father     Heart Disease Father     Depression Maternal Grandmother     Depression Maternal Grandfather     Depression Paternal Grandmother     Depression Paternal Grandfather        SOCIAL HISTORY    Social History     Tobacco Use    Smoking status: Former     Types: Cigarettes     Quit date: 2013     Years since quittin.1    Smokeless tobacco: Never    Tobacco comments:     EDUCATED DM II SLOWS WOUND HEALING   Substance Use Topics    Alcohol use: No    Drug use: No       ALLERGIES    No Known Allergies    MEDICATIONS    Current Outpatient Medications on File Prior to Encounter   Medication Sig Dispense Refill    Tirzepatide (MOUNJARO) 2.5 MG/0.5ML SOPN SC injection Inject 2.5 mg into the skin once a week On sundays      busPIRone (BUSPAR) 5 MG tablet Take 5 mg by mouth 3 times daily as needed      lisinopril (PRINIVIL;ZESTRIL) 5 MG tablet Take 5 mg by mouth daily      metoclopramide (REGLAN) 10 MG tablet Take 10 mg by mouth 3 times daily      Cholecalciferol (VITAMIN D3) 50 MCG (2000 UT) CAPS Take 1 capsule by mouth daily      budesonide-formoterol (SYMBICORT) 80-4.5 MCG/ACT AERO Inhale 2 puffs into the lungs 2 times daily      atorvastatin (LIPITOR) 10 MG tablet Take 10 mg by mouth daily      Norgestim-Eth Estrad Triphasic (TRI-ESTARYLLA PO) Take 1 tablet by mouth daily      Empagliflozin-metFORMIN HCl ER (SYNJARDY XR) 12.5-1000 MG TB24 Take 1 tablet by mouth 2 times daily      vitamin C (ASCORBIC ACID) 500 MG tablet Take 1,000 mg by mouth daily      zinc gluconate 50 MG tablet Take 50 mg by mouth daily      naproxen (NAPROSYN) 500 MG tablet Take 1 tablet by mouth 2 times daily as needed for Pain 20 tablet 0     No current facility-administered medications on file prior to encounter. REVIEW OF SYSTEMS    Pertinent items are noted in HPI.     Constitutional: Negative for systemic symptoms including fever, chills and malaise. Objective:      /74   Pulse 91   Temp 97.8 °F (36.6 °C) (Temporal)   Resp 18     PHYSICAL EXAM      General: The patient is in no acute distress. Mental status:  Patient is appropriate, is  oriented to place and plan of care. Dermatologic exam: Visual inspection of the periwound reveals the skin to be normal in turgor and texture, dry, and coarse  Wound exam: see wound description below in procedure note      Assessment:     Problem List Items Addressed This Visit          Endocrine    WD-Diabetic ulcer of right great toe ([address]    Relevant Medications    Tirzepatide (MOUNJARO) 2.5 MG/0.5ML SOPN SC injection    Other Relevant Orders    Initiate Outpatient Wound Care Protocol       Other    WD-Chronic ulcer of great toe of right foot with fat layer exposed ([address] - Primary    Relevant Orders    Initiate Outpatient Wound Care Protocol     Procedure Note    Indications:  Based on my examination of this patient's wound(s) today, sharp excision into necrotic subcutaneous tissue is required to promote healing and evaluate the extent of previous healing. Performed by: CARMEN Ortega - CNP    Consent obtained: Yes    Time out taken:  Yes    Pain Control: N/A      Debridement:Excisional Debridement    Using curette the wound(s) was/were sharply debrided down through and including the removal of subcutaneous tissue. Devitalized Tissue Debrided:  fibrin, biofilm, slough, exudate, and callus    Pre Debridement Measurements:  Are located in the Wound Documentation Flow Sheet    All active wounds listed below with today's date are evaluated  Wound(s)    debrided this date include # : 1     Post  Debridement Measurements:  Wound 07/20/22 Right #1 Right Great Toe (Active)   Wound Image   01/25/23 1339   Wound Etiology Diabetic 02/01/23 1337   Dressing Status Clean;Dry; Intact; New dressing applied 01/25/23 1411   Wound Cleansed Wound cleanser; Soap and water 02/01/23 1337 Dressing/Treatment Collagen;Betadine swabs/povidone iodine; Roll gauze;Tape/Soft cloth adhesive tape; Cast 01/25/23 1411   Offloading for Diabetic Foot Ulcers Total contact cast 02/01/23 1337   Wound Length (cm) 0.3 cm 02/01/23 1337   Wound Width (cm) 0.3 cm 02/01/23 1337   Wound Depth (cm) 0.1 cm 02/01/23 1337   Wound Surface Area (cm^2) 0.09 cm^2 02/01/23 1337   Change in Wound Size % (l*w) 91 02/01/23 1337   Wound Volume (cm^3) 0.009 cm^3 02/01/23 1337   Wound Healing % 91 02/01/23 1337   Post-Procedure Length (cm) 0.3 cm 02/01/23 1400   Post-Procedure Width (cm) 0.3 cm 02/01/23 1400   Post-Procedure Depth (cm) 0.1 cm 02/01/23 1400   Post-Procedure Surface Area (cm^2) 0.09 cm^2 02/01/23 1400   Post-Procedure Volume (cm^3) 0.009 cm^3 02/01/23 1400   Distance Tunneling (cm) 0 cm 02/01/23 1337   Tunneling Position ___ O'Clock 0 02/01/23 1337   Undermining Starts ___ O'Clock 0 02/01/23 1337   Undermining Ends___ O'Clock 0 02/01/23 1337   Undermining Maxium Distance (cm) 0 02/01/23 1337   Wound Assessment Pink/red 02/01/23 1337   Drainage Amount Small 02/01/23 1337   Drainage Description Serosanguinous;Brown 02/01/23 1337   Odor None 02/01/23 1337   Annika-wound Assessment Hyperkeratosis (callous) 02/01/23 1337   Margins Defined edges 02/01/23 1337   Wound Thickness Description not for Pressure Injury Full thickness 02/01/23 1337   Number of days: 196       Percent of Wound(s) Debrided: approximately 100%    Total  Area  Debrided:  0.09 sq cm     Bleeding:  Minimal    Hemostasis Achieved:  by pressure    Procedural Pain:  0  / 10     Post Procedural Pain:  0 / 10     Response to treatment:  Well tolerated by patient. Status of wound progress and description from last visit:   Much improved. Application of cast:    With the indications of casting being present and no contraindications, information and  instructions were given to the patient and the patient consented to the treatment.   After proper wound care and appropriate padding. A total contact cast was applied according to protocol. The patient tolerated the procedure well. Plan:       Discharge Instructions         PHYSICIAN ORDERS AND DISCHARGE INSTRUCTIONS     NOTE: Upon discharge from the [address], you will receive a patient experience survey. We would be grateful if you would take the time to fill this survey out. Wound care order history:                 MAX's   Right       Left               Date:              Cultures:  Right Great Toe cultured 11/30/22              Grafts:  Applied for grafts 11/9/22              Antibiotics:           Continuing wound care orders and information:                 Residence:                Continue home health care with:              Your wound-care supplies will be provided by: Wound cleansing:              Do not scrub or use excessive force. Wash hands with soap and water before and after dressing changes. Prior to applying a clean dressing, cleanse wound with normal saline, wound cleanser, or mild soap and water. Ask the physician or nurse before getting the wound(s) wet in a shower     Daily Wound management:              Keep weight off wounds and reposition every 2 hours. Avoid standing for long periods of time. Apply wraps/stockings in AM and remove at bedtime. If swelling is present, elevate legs to the level of the heart or above for 30 minutes 4-5 times a day and/or when sitting. When taking antibiotics take entire prescription as ordered by physician do not stop taking until medicine is all gone. Wound Care Notes:   Possible cast next week 11/2/22  [facility] for this week: 2/1/23     Right Great Toe cultured 11/30/22     Right Great Toe - Wash with soap and water, pat dry.    Apply Stimulen to wound bed. Cover with Ioplex  Secure with conform. TCC. Leave in place for 1 week. Follow up with Osei Echols CNP in 1 week in the wound care center.   Call [phone] for any questions or concerns        Treatment Note      Written Patient Dismissal Instructions Given            Electronically signed by CARMEN Andrade CNP on 2/1/2023 at 2:00 PM

## 2023-02-01 NOTE — DISCHARGE INSTRUCTIONS
PHYSICIAN ORDERS AND DISCHARGE INSTRUCTIONS     NOTE: Upon discharge from the [address], you will receive a patient experience survey. We would be grateful if you would take the time to fill this survey out. Wound care order history:                 MAX's   Right       Left               Date:              Cultures:  Right Great Toe cultured 11/30/22              Grafts:  Applied for grafts 11/9/22              Antibiotics:           Continuing wound care orders and information:                 Residence:                Continue home health care with:              Your wound-care supplies will be provided by: Wound cleansing:              Do not scrub or use excessive force. Wash hands with soap and water before and after dressing changes. Prior to applying a clean dressing, cleanse wound with normal saline, wound cleanser, or mild soap and water. Ask the physician or nurse before getting the wound(s) wet in a shower     Daily Wound management:              Keep weight off wounds and reposition every 2 hours. Avoid standing for long periods of time. Apply wraps/stockings in AM and remove at bedtime. If swelling is present, elevate legs to the level of the heart or above for 30 minutes 4-5 times a day and/or when sitting. When taking antibiotics take entire prescription as ordered by physician do not stop taking until medicine is all gone. Wound Care Notes:   Possible cast next week 11/2/22  [facility] [facility] [address] for this week: 2/1/23     Right Great Toe cultured 11/30/22     Right Great Toe - Wash with soap and water, pat dry. Apply Stimulen to wound bed. Cover with Ioplex. Secure with conform. TCC. Leave in place for 1 week. Follow up with Dee Barnhart CNP in 1 week in the wound care center.   Call [phone] for any questions or concerns

## 2023-02-08 ENCOUNTER — HOSPITAL ENCOUNTER (OUTPATIENT)
Dept: WOUND CARE | Age: 28
Discharge: HOME | End: 2023-02-08
Payer: COMMERCIAL

## 2023-02-08 VITALS
DIASTOLIC BLOOD PRESSURE: 68 MMHG | HEART RATE: 98 BPM | SYSTOLIC BLOOD PRESSURE: 135 MMHG | TEMPERATURE: 95.3 F | RESPIRATION RATE: 18 BRPM

## 2023-02-08 DIAGNOSIS — L97.519 DIABETIC ULCER OF RIGHT GREAT TOE (HCC): Primary | ICD-10-CM

## 2023-02-08 DIAGNOSIS — L97.512 CHRONIC ULCER OF GREAT TOE OF RIGHT FOOT WITH FAT LAYER EXPOSED (HCC): ICD-10-CM

## 2023-02-08 DIAGNOSIS — E11.621 DIABETIC ULCER OF RIGHT GREAT TOE (HCC): Primary | ICD-10-CM

## 2023-02-08 PROCEDURE — 29445 APPL RIGID TOT CNTC LEG CAST: CPT

## 2023-02-08 PROCEDURE — 11055 PARING/CUTG B9 HYPRKER LES 1: CPT

## 2023-02-08 RX ORDER — LIDOCAINE HYDROCHLORIDE 20 MG/ML
JELLY TOPICAL ONCE
OUTPATIENT
Start: 2023-02-08 | End: 2023-02-08

## 2023-02-08 RX ORDER — LIDOCAINE HYDROCHLORIDE 40 MG/ML
SOLUTION TOPICAL ONCE
OUTPATIENT
Start: 2023-02-08 | End: 2023-02-08

## 2023-02-08 RX ORDER — GENTAMICIN SULFATE 1 MG/G
OINTMENT TOPICAL ONCE
OUTPATIENT
Start: 2023-02-08 | End: 2023-02-08

## 2023-02-08 RX ORDER — BACITRACIN ZINC AND POLYMYXIN B SULFATE 500; 1000 [USP'U]/G; [USP'U]/G
OINTMENT TOPICAL ONCE
OUTPATIENT
Start: 2023-02-08 | End: 2023-02-08

## 2023-02-08 RX ORDER — CLOBETASOL PROPIONATE 0.5 MG/G
OINTMENT TOPICAL ONCE
OUTPATIENT
Start: 2023-02-08 | End: 2023-02-08

## 2023-02-08 RX ORDER — LIDOCAINE 50 MG/G
OINTMENT TOPICAL ONCE
OUTPATIENT
Start: 2023-02-08 | End: 2023-02-08

## 2023-02-08 RX ORDER — LIDOCAINE 40 MG/G
CREAM TOPICAL ONCE
OUTPATIENT
Start: 2023-02-08 | End: 2023-02-08

## 2023-02-08 RX ORDER — BACITRACIN, NEOMYCIN, POLYMYXIN B 400; 3.5; 5 [USP'U]/G; MG/G; [USP'U]/G
OINTMENT TOPICAL ONCE
OUTPATIENT
Start: 2023-02-08 | End: 2023-02-08

## 2023-02-08 RX ORDER — GINSENG 100 MG
CAPSULE ORAL ONCE
OUTPATIENT
Start: 2023-02-08 | End: 2023-02-08

## 2023-02-08 RX ORDER — BETAMETHASONE DIPROPIONATE 0.05 %
OINTMENT (GRAM) TOPICAL ONCE
OUTPATIENT
Start: 2023-02-08 | End: 2023-02-08

## 2023-02-08 ASSESSMENT — PAIN DESCRIPTION - ORIENTATION: ORIENTATION: RIGHT

## 2023-02-08 ASSESSMENT — PAIN DESCRIPTION - FREQUENCY: FREQUENCY: CONTINUOUS

## 2023-02-08 ASSESSMENT — PAIN DESCRIPTION - PAIN TYPE: TYPE: ACUTE PAIN

## 2023-02-08 ASSESSMENT — PAIN DESCRIPTION - LOCATION: LOCATION: TOE (COMMENT WHICH ONE)

## 2023-02-08 ASSESSMENT — PAIN SCALES - GENERAL: PAINLEVEL_OUTOF10: 3

## 2023-02-08 ASSESSMENT — PAIN DESCRIPTION - DESCRIPTORS: DESCRIPTORS: ACHING;STABBING

## 2023-02-08 ASSESSMENT — PAIN - FUNCTIONAL ASSESSMENT: PAIN_FUNCTIONAL_ASSESSMENT: PREVENTS OR INTERFERES SOME ACTIVE ACTIVITIES AND ADLS

## 2023-02-08 ASSESSMENT — PAIN DESCRIPTION - ONSET: ONSET: ON-GOING

## 2023-02-08 NOTE — PROGRESS NOTES
Wound Care Center Progress Note With Procedure    Martha Palacio  AGE: 32 y.o. GENDER: female  : 1995  EPISODE DATE:  2023     Subjective:     Chief Complaint   Patient presents with    Wound Check     Right foot         HISTORY of PRESENT ILLNESS      Martha Palacio is a 32 y.o. female who presents today for wound evaluation of Acute on chronic diabetic and pressure ulcer(s) of the right great toe. The ulcer is of mild severity. The underlying cause of the wound is diabetes and pressure. Healed with TCC, callus needs pared down. Wound Pain Timing/Severity: none  Quality of pain: N/A  Severity of pain:  0 / 10   Modifying Factors: diabetes, chronic pressure, and obesity  Associated Signs/Symptoms: none        PAST MEDICAL HISTORY        Diagnosis Date    Diabetes mellitus ([address]        PAST SURGICAL HISTORY    History reviewed. No pertinent surgical history.     FAMILY HISTORY    Family History   Problem Relation Age of Onset    Diabetes Father     Heart Disease Father     Depression Maternal Grandmother     Depression Maternal Grandfather     Depression Paternal Grandmother     Depression Paternal Grandfather        SOCIAL HISTORY    Social History     Tobacco Use    Smoking status: Former     Types: Cigarettes     Quit date: 2013     Years since quittin.1    Smokeless tobacco: Never    Tobacco comments:     EDUCATED DM II SLOWS WOUND HEALING   Substance Use Topics    Alcohol use: No    Drug use: No       ALLERGIES    No Known Allergies    MEDICATIONS    Current Outpatient Medications on File Prior to Encounter   Medication Sig Dispense Refill    Tirzepatide (MOUNJARO) 2.5 MG/0.5ML SOPN SC injection Inject 2.5 mg into the skin once a week On sundays      busPIRone (BUSPAR) 5 MG tablet Take 5 mg by mouth 3 times daily as needed      lisinopril (PRINIVIL;ZESTRIL) 5 MG tablet Take 5 mg by mouth daily      metoclopramide (REGLAN) 10 MG tablet Take 10 mg by mouth 3 times daily Cholecalciferol (VITAMIN D3) 50 MCG (2000 UT) CAPS Take 1 capsule by mouth daily      budesonide-formoterol (SYMBICORT) 80-4.5 MCG/ACT AERO Inhale 2 puffs into the lungs 2 times daily      atorvastatin (LIPITOR) 10 MG tablet Take 10 mg by mouth daily      Norgestim-Eth Estrad Triphasic (TRI-ESTARYLLA PO) Take 1 tablet by mouth daily      Empagliflozin-metFORMIN HCl ER (SYNJARDY XR) 12.5-1000 MG TB24 Take 1 tablet by mouth 2 times daily      vitamin C (ASCORBIC ACID) 500 MG tablet Take 1,000 mg by mouth daily      zinc gluconate 50 MG tablet Take 50 mg by mouth daily      naproxen (NAPROSYN) 500 MG tablet Take 1 tablet by mouth 2 times daily as needed for Pain 20 tablet 0     No current facility-administered medications on file prior to encounter. REVIEW OF SYSTEMS    Pertinent items are noted in HPI. Constitutional: Negative for systemic symptoms including fever, chills and malaise. Objective:      /68   Pulse 98   Temp (!) 95.3 °F (35.2 °C) (Temporal)   Resp 18     PHYSICAL EXAM      General: The patient is in no acute distress. Mental status:  Patient is appropriate, is  oriented to place and plan of care. Dermatologic exam: Visual inspection of the periwound reveals the skin to be scaly  Wound exam: see wound description below in procedure note      Assessment:     Problem List Items Addressed This Visit       WD-Chronic ulcer of great toe of right foot with fat layer exposed ([address]    Relevant Orders    Initiate Outpatient Wound Care Protocol    WD-Diabetic ulcer of right great toe [facility]) - Primary    Relevant Orders    Initiate Outpatient Wound Care Protocol     Procedure Note    Indications:  Based on my examination of this patient's wound(s) today, sharp excision into necrotic epidermis is required to promote healing and evaluate the extent of previous healing.     Performed by: Emilia Alexandra MD    Consent obtained: Yes    Time out taken:  Yes    Pain Control: Debridement:Excisional Debridement    Using #15 blade scalpel the wound(s) was/were sharply debrided down through and including the removal of epidermis. Devitalized Tissue Debrided:  callus    Pre Debridement Measurements:  Are located in the Wound Documentation Flow Sheet    All active wounds listed below with today's date are evaluated  Wound(s)    debrided this date include # : 1     Post  Debridement Measurements:  Wound 07/20/22 Right #1 Right Great Toe (Active)   Wound Image   01/25/23 1339   Wound Etiology Diabetic 02/08/23 0918   Dressing Status New dressing applied 02/08/23 0918   Wound Cleansed Wound cleanser; Soap and water 02/08/23 0840   Dressing/Treatment Collagen;Betadine swabs/povidone iodine; Cast 02/08/23 0918   Offloading for Diabetic Foot Ulcers Total contact cast 02/08/23 0918   Wound Length (cm) 0 cm 02/08/23 0840   Wound Width (cm) 0 cm 02/08/23 0840   Wound Depth (cm) 0 cm 02/08/23 0840   Wound Surface Area (cm^2) 0 cm^2 02/08/23 0840   Change in Wound Size % (l*w) 100 02/08/23 0840   Wound Volume (cm^3) 0 cm^3 02/08/23 0840   Wound Healing % 100 02/08/23 0840   Post-Procedure Length (cm) 0.3 cm 02/01/23 1400   Post-Procedure Width (cm) 0.3 cm 02/01/23 1400   Post-Procedure Depth (cm) 0.1 cm 02/01/23 1400   Post-Procedure Surface Area (cm^2) 0.09 cm^2 02/01/23 1400   Post-Procedure Volume (cm^3) 0.009 cm^3 02/01/23 1400   Distance Tunneling (cm) 0 cm 02/08/23 0840   Tunneling Position ___ O'Clock 0 02/08/23 0840   Undermining Starts ___ O'Clock 0 02/08/23 0840   Undermining Ends___ O'Clock 0 02/08/23 0840   Undermining Maxium Distance (cm) 0 02/08/23 0840   Wound Assessment Dry 02/08/23 0840   Drainage Amount None 02/08/23 0901   Drainage Description Brown 02/08/23 0840   Odor None 02/08/23 0840   Annika-wound Assessment Hyperkeratosis (callous) 02/08/23 0840   Margins Attached edges 02/08/23 0840   Wound Thickness Description not for Pressure Injury Full thickness 02/08/23 0840 Number of days: 202       Percent of Wound(s) Debrided: approximately 100%    Total  Area  Debrided:  0.5 sq cm     Bleeding:  None    Hemostasis Achieved:  not needed    Procedural Pain:  0  / 10     Post Procedural Pain:  0 / 10     Response to treatment:  Well tolerated by patient. Status of wound progress and description from last visit:   Healed. Continue TCC for at least another week. Plan:       Discharge Instructions         PHYSICIAN ORDERS AND DISCHARGE INSTRUCTIONS     NOTE: Upon discharge from the [address], you will receive a patient experience survey. We would be grateful if you would take the time to fill this survey out. Wound care order history:                 MAX's   Right       Left               Date:              Cultures:  Right Great Toe cultured 11/30/22              Grafts:  Applied for grafts 11/9/22              Antibiotics:           Continuing wound care orders and information:                 Residence:                Continue home health care with:              Your wound-care supplies will be provided by: Wound cleansing:              Do not scrub or use excessive force. Wash hands with soap and water before and after dressing changes. Prior to applying a clean dressing, cleanse wound with normal saline, wound cleanser, or mild soap and water. Ask the physician or nurse before getting the wound(s) wet in a shower     Daily Wound management:              Keep weight off wounds and reposition every 2 hours. Avoid standing for long periods of time. Apply wraps/stockings in AM and remove at bedtime. If swelling is present, elevate legs to the level of the heart or above for 30 minutes 4-5 times a day and/or when sitting. When taking antibiotics take entire prescription as ordered by physician do not stop taking until medicine is all gone. Wound Care Notes:   Possible cast next week 11/2/22  [facility] [address] [facility] [address] for this week: 2/8/23     Right Great Toe cultured 11/30/22     Right Great Toe - Wash with soap and water, pat dry. Apply Stimulen to wound bed. Cover with Ioplex. Secure with conform. TCC. Leave in place for 1 week. Follow up with Fish Carter CNP in 1 week in the wound care center.   Call [phone] for any questions or concerns        Treatment Note Wound 07/20/22 Right #1 Right Great Toe-Dressing/Treatment: Collagen, Betadine swabs/povidone iodine, Cast (stimulen,ioplex,conform,tcc by rn)    Written Patient Dismissal Instructions Given            Electronically signed by Larissa Peralta MD on 2/8/2023 at 9:33 AM

## 2023-02-08 NOTE — DISCHARGE INSTRUCTIONS
PHYSICIAN ORDERS AND DISCHARGE INSTRUCTIONS     NOTE: Upon discharge from the [address], you will receive a patient experience survey. We would be grateful if you would take the time to fill this survey out. Wound care order history:                 MAX's   Right       Left               Date:              Cultures:  Right Great Toe cultured 11/30/22              Grafts:  Applied for grafts 11/9/22              Antibiotics:           Continuing wound care orders and information:                 Residence:                Continue home health care with:              Your wound-care supplies will be provided by: Wound cleansing:              Do not scrub or use excessive force. Wash hands with soap and water before and after dressing changes. Prior to applying a clean dressing, cleanse wound with normal saline, wound cleanser, or mild soap and water. Ask the physician or nurse before getting the wound(s) wet in a shower     Daily Wound management:              Keep weight off wounds and reposition every 2 hours. Avoid standing for long periods of time. Apply wraps/stockings in AM and remove at bedtime. If swelling is present, elevate legs to the level of the heart or above for 30 minutes 4-5 times a day and/or when sitting. When taking antibiotics take entire prescription as ordered by physician do not stop taking until medicine is all gone. Wound Care Notes:   Possible cast next week 11/2/22  [facility] [address] [facility] [address] for this week: 2/8/23     Right Great Toe cultured 11/30/22     Right Great Toe - Wash with soap and water, pat dry. Apply Stimulen to wound bed. Cover with Ioplex. Secure with conform. TCC. Leave in place for 1 week. Follow up with Cecilia Young CNP in 1 week in the wound care center.   Call [phone] for any questions or concerns

## 2023-02-08 NOTE — PROGRESS NOTES
Total Contact Cast    NAME:  Martha Palacio  YOB: 1995  MEDICAL RECORD NUMBER:  [id number]  DATE:  2/8/2023    Goal:  Patient will maintain integrity of cast, avoid mobility hazards, and report complications that may occur (foul odor, pain, numbness, cracked cast).     Removed old contact cast if indicated and wash extremity with soap and water  Applied ordered dressing over wound(s)   Applied cast padding  Applied foam padding  Applied per Dr. Evelyn Paredes    Applied cast per  Guidelines    Electronically signed by Magaly Montilla RN on 2/8/2023 at 9:22 AM

## 2023-02-15 ENCOUNTER — HOSPITAL ENCOUNTER (OUTPATIENT)
Dept: WOUND CARE | Age: 28
Discharge: HOME | End: 2023-02-15
Payer: COMMERCIAL

## 2023-02-15 VITALS
HEART RATE: 94 BPM | RESPIRATION RATE: 18 BRPM | SYSTOLIC BLOOD PRESSURE: 156 MMHG | DIASTOLIC BLOOD PRESSURE: 93 MMHG | TEMPERATURE: 97.4 F

## 2023-02-15 DIAGNOSIS — E11.621 DIABETIC ULCER OF RIGHT GREAT TOE (HCC): Primary | ICD-10-CM

## 2023-02-15 DIAGNOSIS — L97.519 DIABETIC ULCER OF RIGHT GREAT TOE (HCC): Primary | ICD-10-CM

## 2023-02-15 DIAGNOSIS — L97.512 CHRONIC ULCER OF GREAT TOE OF RIGHT FOOT WITH FAT LAYER EXPOSED (HCC): ICD-10-CM

## 2023-02-15 PROCEDURE — 99212 OFFICE O/P EST SF 10 MIN: CPT

## 2023-02-15 PROCEDURE — 99213 OFFICE O/P EST LOW 20 MIN: CPT | Performed by: NURSE PRACTITIONER

## 2023-02-15 RX ORDER — BETAMETHASONE DIPROPIONATE 0.05 %
OINTMENT (GRAM) TOPICAL ONCE
Status: CANCELLED | OUTPATIENT
Start: 2023-02-15 | End: 2023-02-15

## 2023-02-15 RX ORDER — LIDOCAINE HYDROCHLORIDE 20 MG/ML
JELLY TOPICAL ONCE
Status: CANCELLED | OUTPATIENT
Start: 2023-02-15 | End: 2023-02-15

## 2023-02-15 RX ORDER — CLOBETASOL PROPIONATE 0.5 MG/G
OINTMENT TOPICAL ONCE
Status: CANCELLED | OUTPATIENT
Start: 2023-02-15 | End: 2023-02-15

## 2023-02-15 RX ORDER — BACITRACIN ZINC AND POLYMYXIN B SULFATE 500; 1000 [USP'U]/G; [USP'U]/G
OINTMENT TOPICAL ONCE
Status: CANCELLED | OUTPATIENT
Start: 2023-02-15 | End: 2023-02-15

## 2023-02-15 RX ORDER — LIDOCAINE HYDROCHLORIDE 40 MG/ML
SOLUTION TOPICAL ONCE
Status: CANCELLED | OUTPATIENT
Start: 2023-02-15 | End: 2023-02-15

## 2023-02-15 RX ORDER — LIDOCAINE 50 MG/G
OINTMENT TOPICAL ONCE
Status: CANCELLED | OUTPATIENT
Start: 2023-02-15 | End: 2023-02-15

## 2023-02-15 RX ORDER — BACITRACIN, NEOMYCIN, POLYMYXIN B 400; 3.5; 5 [USP'U]/G; MG/G; [USP'U]/G
OINTMENT TOPICAL ONCE
Status: CANCELLED | OUTPATIENT
Start: 2023-02-15 | End: 2023-02-15

## 2023-02-15 RX ORDER — GINSENG 100 MG
CAPSULE ORAL ONCE
Status: CANCELLED | OUTPATIENT
Start: 2023-02-15 | End: 2023-02-15

## 2023-02-15 RX ORDER — GENTAMICIN SULFATE 1 MG/G
OINTMENT TOPICAL ONCE
Status: CANCELLED | OUTPATIENT
Start: 2023-02-15 | End: 2023-02-15

## 2023-02-15 RX ORDER — LIDOCAINE 40 MG/G
CREAM TOPICAL ONCE
Status: CANCELLED | OUTPATIENT
Start: 2023-02-15 | End: 2023-02-15

## 2023-02-15 ASSESSMENT — PAIN - FUNCTIONAL ASSESSMENT: PAIN_FUNCTIONAL_ASSESSMENT: PREVENTS OR INTERFERES SOME ACTIVE ACTIVITIES AND ADLS

## 2023-02-15 ASSESSMENT — PAIN DESCRIPTION - ORIENTATION: ORIENTATION: RIGHT

## 2023-02-15 ASSESSMENT — PAIN DESCRIPTION - PAIN TYPE: TYPE: ACUTE PAIN

## 2023-02-15 ASSESSMENT — PAIN SCALES - GENERAL: PAINLEVEL_OUTOF10: 3

## 2023-02-15 ASSESSMENT — PAIN DESCRIPTION - LOCATION: LOCATION: TOE (COMMENT WHICH ONE)

## 2023-02-15 ASSESSMENT — PAIN DESCRIPTION - DESCRIPTORS: DESCRIPTORS: SHOOTING;THROBBING

## 2023-02-15 ASSESSMENT — PAIN DESCRIPTION - FREQUENCY: FREQUENCY: CONTINUOUS

## 2023-02-15 ASSESSMENT — PAIN DESCRIPTION - ONSET: ONSET: ON-GOING

## 2023-02-15 NOTE — PROGRESS NOTES
Wound Care Center Progress Note       Latosha Ramirez  AGE: 25 y.o. GENDER: female  : 1995  TODAY'S DATE:  2/15/2023        Subjective:     Chief Complaint   Patient presents with    Wound Check     RLE         HISTORY of PRESENT ILLNESS    Latosha Ramirez is a 32 y.o. female who presents to the [address] [place] [address] for a visit for evaluation and treatment of Chronic diabetic  ulcer(s) of  Rt. foot hallux. The condition is of moderate severity. The ulcer has been present since 2022. The underlying cause is thought to be diabetes. The patients care to date has included regular wound care in [place] . The patient has significant underlying medical conditions as below. Healed today      Wound Pain Timing/Severity: none  Quality of pain: N/A  Severity of pain:  0 / 10  Modifying Factors: diabetes  Associated Signs/Symptoms: erythema, drainage, and numbness        PAST MEDICAL HISTORY        Diagnosis Date    Diabetes mellitus ([facility] [address]        PAST SURGICAL HISTORY    History reviewed. No pertinent surgical history.     FAMILY HISTORY    Family History   Problem Relation Age of Onset    Diabetes Father     Heart Disease Father     Depression Maternal Grandmother     Depression Maternal Grandfather     Depression Paternal Grandmother     Depression Paternal Grandfather        SOCIAL HISTORY    Social History     Tobacco Use    Smoking status: Former     Types: Cigarettes     Quit date: 2013     Years since quittin.1    Smokeless tobacco: Never    Tobacco comments:     EDUCATED DM II SLOWS WOUND HEALING   Substance Use Topics    Alcohol use: No    Drug use: No       ALLERGIES    No Known Allergies    MEDICATIONS    Current Outpatient Medications on File Prior to Encounter   Medication Sig Dispense Refill    Tirzepatide (MOUNJARO) 2.5 MG/0.5ML SOPN SC injection Inject 2.5 mg into the skin once a week On sundays      busPIRone (BUSPAR) 5 MG tablet Take 5 mg by mouth 3 times daily as needed lisinopril (PRINIVIL;ZESTRIL) 5 MG tablet Take 5 mg by mouth daily      metoclopramide (REGLAN) 10 MG tablet Take 10 mg by mouth 3 times daily      Cholecalciferol (VITAMIN D3) 50 MCG (2000 UT) CAPS Take 1 capsule by mouth daily      budesonide-formoterol (SYMBICORT) 80-4.5 MCG/ACT AERO Inhale 2 puffs into the lungs 2 times daily      atorvastatin (LIPITOR) 10 MG tablet Take 10 mg by mouth daily      Norgestim-Eth Estrad Triphasic (TRI-ESTARYLLA PO) Take 1 tablet by mouth daily      Empagliflozin-metFORMIN HCl ER (SYNJARDY XR) 12.5-1000 MG TB24 Take 1 tablet by mouth 2 times daily      vitamin C (ASCORBIC ACID) 500 MG tablet Take 1,000 mg by mouth daily      zinc gluconate 50 MG tablet Take 50 mg by mouth daily      naproxen (NAPROSYN) 500 MG tablet Take 1 tablet by mouth 2 times daily as needed for Pain 20 tablet 0     No current facility-administered medications on file prior to encounter. REVIEW OF SYSTEMS    Pertinent items are noted in HPI. Constitutional: Negative for systemic symptoms including fever, chills and malaise. Objective:      BP (!) 156/93   Pulse 94   Temp 97.4 °F (36.3 °C) (Temporal)   Resp 18     PHYSICAL EXAM      General: The patient is in no acute distress. Mental status:  Patient is appropriate, is  oriented to place and plan of care. Dermatologic exam: Visual inspection of the periwound reveals the skin to be normal in turgor and texture, dry, and coarse.   Wound exam:  see wound description below     All active wounds listed below with today's date are evaluated           Assessment:       Problem List Items Addressed This Visit          Endocrine    WD-Diabetic ulcer of right great toe [facility]) - Primary    Relevant Orders    Initiate Outpatient Wound Care Protocol       Other    WD-Chronic ulcer of great toe of right foot with fat layer exposed [facility])    Relevant Orders    Initiate Outpatient Wound Care Protocol       Status of wound progress and description from last visit:   Healed     Discharge at this time. Patient knows that they may return or call with any questions, comments, or concerns. Discussed strategies for preventing future wounds, including regular compression, daily moisturizing, regular exercise, and performing regular foot checks. Patient verbalized understanding      Foot care: advised to wash feet daily, pat dry and apply lotion at night, avoiding between toes. Need to look at feet daily and report to a physician any signs of inflammation or skin damage. Discussed diabetes shoes and socks. Different diabetic medications. Managing high and low sugar readings. Patient educated on the 6 essential components necessary for wound healing: Circulation, Debridements, Proper Dressings and Topical Wound Products, Infection Control, Edema Control and Offloading. Patient educated on those factors that negatively effect or impact wound healing: smoking, obesity, uncontrolled diabetes, anticoagulant and immunosuppressive regimens, inadequate nutrition, untreated arterial and venous disease if applicable and measures to manage edema. Off Loading  Offloading or minimizing or removing weight placed on an area with poor circulation such as diabetic wounds or pressure. This can be achieved with crutches, wheel chair, knee walker etc. Minimizing pressure through partial weight bearing (minimizing the amount of  pressure applied and or the amount of time on the area of pressure) or maintaining a non-weight bearing status can be used to promote and often can be essential for thee wound to heal. Off loading may also need to be achieved for non-weight bearing wounds such as pressure ulcers to the torso. Turning and changing positions frequently, at least every two hours. Use of pressure cushion if sitting up in chair.      Skin Care  Keep skin clean and well moisturized , moisturize routinely with ointments for heavier moisturizer needs for extremely dry skin or cracks such as A&D ointment and lotions for a light moisturizer such as CeraVe or Eucerin. If incontinent change incontinence garments as soon as soiled and keeping skin clean and use barrier cream to protect the skin. Reduce Salt and Sodium  Choose low- or reduced- sodium, or no-salt-added versions of foods and condiments when available. Buy fresh, plain frozen, or canned with no-added-salt vegetables. Use fresh poultry, fish and lean meat, rather than canned, smoked or processed types. Choose ready-to-eat breakfast cereals that are lower in sodium. Limit cured foods (such as alcaraz and ham), foods packed in brine (such as pickled foods) and condiments (such as MSG, mustard, horseradish, and catsup). Limit even lower sodium versions of soy sauce and teriyaki sauce-treat these condiments just like salt). In cooking and at the table, flavor foods with herbs, spices, lemon, lime, vinegar or salt-free seasoning blends. Start by cutting salt in half. Cook rice, pasta and hot cereals without salt. Cut back on instant or flavored rice, pasta and cereal mixes, which usually have added salt. Choose convenience foods that are lower in sodium. Limit frozen dinners, packaged mixes, canned soups and dressings. Rinse canned foods, such as tuna, to remove some sodium. Choose fruits or vegetables instead of salty snack foods. Edema Management if applicable  Whenever resting, raise your legs up. Try to keep the swollen area higher than the level of your heart. Take breaks from standing or sitting in one position. Walk around to increase the blood flow in your lower legs. Move your feet and ankles often while you stand, or tighten and relax your leg muscles. Wear support stockings. Put them on in the morning, before swelling gets worse. Eat a balanced diet. Lose weight if you need to. Limit the amount of salt (sodium) in your diet. Salt holds fluid in the body and may increase swelling.  Apply compression stocking(s) every morning as soon as you get up. Remove at bedtime unless instructed to wear day and night. Hand wash and line dry to prevent loss of elasticity. Replace every 3-4 months to ensure proper fit. Weight Management if Applicable  Will need to ultimately change overall eating behaviors to have success with weight loss. Encouraged to weigh daily and work towards a goal of 1-2 pounds of weight loss weekly. Encouraged to journal all food intake, [employer] pal is a useful tool to help keep track of food intake and caloric value. Keep calorie level at approximately 8236-5114. Protein intake is to be a minimum of 60 grams per day (unless otherwise directed). Water drinking was encouraged with a goal of 64oz-128oz daily. Beverages to be calorie free except for milk. Every other beverage should be water, avoid soda. Continue to increase level of physical activity. Refer to weight management as indicated and  . Plan:     Discharge Instructions         PHYSICIAN ORDERS AND DISCHARGE INSTRUCTIONS     NOTE: Upon discharge from the [address], you will receive a patient experience survey. We would be grateful if you would take the time to fill this survey out. Wound care order history:                 MAX's   Right       Left               Date:              Cultures:  Right Great Toe cultured 11/30/22              Grafts:  Applied for grafts 11/9/22              Antibiotics:           Continuing wound care orders and information:                 Residence:                Continue home health care with:              Your wound-care supplies will be provided by: Wound cleansing:              Do not scrub or use excessive force. Wash hands with soap and water before and after dressing changes. Prior to applying a clean dressing, cleanse wound with normal saline, wound cleanser, or mild soap and water.               Ask the physician or nurse before getting the wound(s) wet in a shower     Daily Wound management:              Keep weight off wounds and reposition every 2 hours. Avoid standing for long periods of time. Apply wraps/stockings in AM and remove at bedtime. If swelling is present, elevate legs to the level of the heart or above for 30 minutes 4-5 times a day and/or when sitting. When taking antibiotics take entire prescription as ordered by physician do not stop taking until medicine is all gone. Wound Care Notes:   Possible cast next week 11/2/22  [facility] in [facility] for this week: 2/15/23     Right Great Toe cultured 11/30/22     Right Great Toe - Healed 2/15/23. Keep moisturized, may keep open to air. Discharged from the wound care center on 2/15/23.   Call [phone] for any questions or concerns        Treatment Note      Written Patient Dismissal Instructions Given            Electronically signed by CARMEN Back CNP on 2/15/2023 at 2:31 PM

## 2023-02-15 NOTE — DISCHARGE INSTRUCTIONS
PHYSICIAN ORDERS AND DISCHARGE INSTRUCTIONS     NOTE: Upon discharge from the [address], you will receive a patient experience survey. We would be grateful if you would take the time to fill this survey out. Wound care order history:                 MAX's   Right       Left               Date:              Cultures:  Right Great Toe cultured 11/30/22              Grafts:  Applied for grafts 11/9/22              Antibiotics:           Continuing wound care orders and information:                 Residence:                Continue home health care with:              Your wound-care supplies will be provided by: Wound cleansing:              Do not scrub or use excessive force. Wash hands with soap and water before and after dressing changes. Prior to applying a clean dressing, cleanse wound with normal saline, wound cleanser, or mild soap and water. Ask the physician or nurse before getting the wound(s) wet in a shower     Daily Wound management:              Keep weight off wounds and reposition every 2 hours. Avoid standing for long periods of time. Apply wraps/stockings in AM and remove at bedtime. If swelling is present, elevate legs to the level of the heart or above for 30 minutes 4-5 times a day and/or when sitting. When taking antibiotics take entire prescription as ordered by physician do not stop taking until medicine is all gone. Wound Care Notes:   Possible cast next week 11/2/22  [facility] [address] [facility] in [address] for this week: 2/15/23     Right Great Toe cultured 11/30/22     Right Great Toe - Healed 2/15/23. Keep moisturized, may keep open to air. Discharged from the wound care center on 2/15/23.   Call [phone] for any questions or concerns

## 2023-09-26 NOTE — PROGRESS NOTES
1/21 Patient here today in good general state, she is here for her screening colonoscopy, patient never had colonoscopy done, denies any personal or family history of colorectal cancer, rectal bleeding, or change on her bowel habits.  Today she had no GI complaints.  2/21 Patient colonoscopy shows evidence of a 6 mm hyperplastic polyp into the ascending colon, a 5 mm tubular adenoma polyp into the transverse colon, a 5 mm hyperplastic  polyp into the descending colon, two 4 mm tubular adenoma polyp into the cecum, one 10 mm tubular adenoma polyp into the sigmoid colon, a 5 mm hyperplastic polyp into the rectum, and internal hemorrhoids.  Today in good general state she had no GI complaints.  Next colonoscopy in 1 year. 7/23 Patient here today in good general state.  She is here for her surveillance colonoscopy.  Her last colonoscopy was done 2 years ago with a recall in 1 year because patient has multiple tubular adenoma polyp 1 of 10 of 10 mm. 9/23 Patient colonoscopy shows evidence of an 8 mm tubular adenoma polyp into the transverse colon, a 4 mm hyperplastic polyp into the sigmoid colon, internal hemorrhoids, lipomatosis ileocecal valve with biopsy with no significant abnormality.  EGD shows evidence of small hiatal hernia, chronic gastritis, controlled in J gastrojejunostomy with anastomosis with healthy-appearing mucosa.  Biopsy results shows evidence of duodenal mucosa and gastric mucosa with no significant abnormality.  Gastroesophageal junction shows evidence of reflux type changes, negative for Melo esophagus, dysplasia or malignancy. Anoscopy in 5 years. Oriented today she is in good general state she has no GI complaints symptoms of gastritis and reflux are controlled.  She will continue with diet. nutrition, untreated arterial and venous disease if applicable and measures to manage edema. Wound Pain Timing/Severity: none  Quality of pain: N/A  Severity of pain:  0 / 10  Modifying Factors: diabetes  Associated Signs/Symptoms: erythema, drainage, and numbness                                 PAST MEDICAL HISTORY        Diagnosis Date    Diabetes mellitus ([facility] [address]        PAST SURGICAL HISTORY    History reviewed. No pertinent surgical history. FAMILY HISTORY    Family History   Problem Relation Age of Onset    Diabetes Father     Heart Disease Father     Depression Maternal Grandmother     Depression Maternal Grandfather     Depression Paternal Grandmother     Depression Paternal Grandfather        SOCIAL HISTORY    Social History     Tobacco Use    Smoking status: Former     Types: Cigarettes     Quit date: 2013     Years since quittin.6    Smokeless tobacco: Never    Tobacco comments:     EDUCATED DM II SLOWS WOUND HEALING   Substance Use Topics    Alcohol use: No    Drug use: No       ALLERGIES    No Known Allergies    MEDICATIONS    Current Outpatient Medications on File Prior to Encounter   Medication Sig Dispense Refill    naproxen (NAPROSYN) 500 MG tablet Take 1 tablet by mouth 2 times daily as needed for Pain 20 tablet 0    acetaminophen (APAP EXTRA STRENGTH) 500 MG tablet Take 1 tablet by mouth every 6 hours as needed for Pain 120 tablet 3     No current facility-administered medications on file prior to encounter. REVIEW OF SYSTEMS    Pertinent items are noted in HPI. Constitutional: Negative for systemic symptoms including fever, chills and malaise. Objective:      /78   Pulse (!) 128   Temp 97 °F (36.1 °C) (Temporal)   Resp 20     PHYSICAL EXAM      General: The patient is in no acute distress. Mental status:  Patient is appropriate, is  oriented to place and plan of care.   Dermatologic exam: Visual inspection of the periwound reveals the skin to be normal in turgor and texture and dry  Wound exam: see wound description below in procedure note      Assessment:     Problem List Items Addressed This Visit          Endocrine    WD-Diabetic ulcer of right great toe ([address]    Relevant Medications    gentamicin (GARAMYCIN) 0.1 % ointment (Start on 8/3/2022  1:45 PM)    Other Relevant Orders    Initiate Outpatient Wound Care Protocol       Other    WD-Chronic ulcer of great toe of right foot with fat layer exposed ([address] - Primary    Relevant Medications    gentamicin (GARAMYCIN) 0.1 % ointment (Start on 8/3/2022  1:45 PM)    Other Relevant Orders    Initiate Outpatient Wound Care Protocol     Procedure Note    Indications:  Based on my examination of this patient's wound(s) today, sharp excision into necrotic subcutaneous tissue is required to promote healing and evaluate the extent of previous healing. Performed by: CARMEN Lynn CNP    Consent obtained: Yes    Time out taken:  Yes    Pain Control: N/A      Debridement:Excisional Debridement    Using curette and #15 blade scalpel the wound(s) was/were sharply debrided down through and including the removal of subcutaneous tissue.         Devitalized Tissue Debrided:  fibrin, biofilm, slough, exudate, and callus    Pre Debridement Measurements:  Are located in the Wound Documentation Flow Sheet    All active wounds listed below with today's date are evaluated  Wound(s)    debrided this date include # : 1     Post  Debridement Measurements:  Wound 07/20/22 Toe (Comment  which one) Anterior;Right #1 GR (Active)   Wound Image   08/03/22 1309   Wound Etiology Diabetic 08/03/22 1309   Dressing Status New dressing applied 07/20/22 1421   Wound Cleansed Wound cleanser 08/03/22 1309   Offloading for Diabetic Foot Ulcers Offloading ordered 07/27/22 1309   Wound Length (cm) 1 cm 08/03/22 1309   Wound Width (cm) 0.5 cm 08/03/22 1309   Wound Depth (cm) 0.1 cm 08/03/22 1309   Wound Surface Area (cm^2) 0.5 cm^2 08/03/22 1309 Change in Wound Size % (l*w) 50 08/03/22 1309   Wound Volume (cm^3) 0.05 cm^3 08/03/22 1309   Wound Healing % 50 08/03/22 1309   Post-Procedure Length (cm) 1 cm 08/03/22 1327   Post-Procedure Width (cm) 0.5 cm 08/03/22 1327   Post-Procedure Depth (cm) 0.1 cm 08/03/22 1327   Post-Procedure Surface Area (cm^2) 0.5 cm^2 08/03/22 1327   Post-Procedure Volume (cm^3) 0.05 cm^3 08/03/22 1327   Distance Tunneling (cm) 0 cm 08/03/22 1309   Tunneling Position ___ O'Clock 0 08/03/22 1309   Undermining Starts ___ O'Clock 0 08/03/22 1309   Undermining Ends___ O'Clock 0 08/03/22 1309   Undermining Maxium Distance (cm) 0 08/03/22 1309   Wound Assessment Pink/red 08/03/22 1309   Drainage Amount Moderate 08/03/22 1309   Drainage Description Serosanguinous 08/03/22 1309   Odor None 08/03/22 1309   Annika-wound Assessment Intact 08/03/22 1309   Margins Defined edges 08/03/22 1309   Wound Thickness Description not for Pressure Injury Full thickness 08/03/22 1309   Number of days: 13       Percent of Wound(s) Debrided: approximately 100%    Total  Area  Debrided:  0.5 sq cm     Bleeding:  Minimal    Hemostasis Achieved:  by pressure    Procedural Pain:  0  / 10     Post Procedural Pain:  0 / 10     Response to treatment:  Well tolerated by patient. Status of wound progress and description from last visit:   Much improved. Continue plan of care for now and continue to monitor  Follow up 1 week       Plan:       Discharge Instructions         [address]     NOTE: Upon discharge from the [address], you will receive a patient experience survey. We would be grateful if you would take the time to fill this survey out.      Wound care order history:                 MAX's   Right       Left               Date:              Cultures:                Grafts:                Antibiotics:           Continuing wound care orders and information:                 Residence:                Continue home health care with:              Your wound-care supplies will be provided by: Wound cleansing:              Do not scrub or use excessive force. Wash hands with soap and water before and after dressing changes. Prior to applying a clean dressing, cleanse wound with normal saline, wound cleanser, or mild soap and water. Ask the physician or nurse before getting the wound(s) wet in a shower     Daily Wound management:              Keep weight off wounds and reposition every 2 hours. Avoid standing for long periods of time. Apply wraps/stockings in AM and remove at bedtime. If swelling is present, elevate legs to the level of the heart or above for 30 minutes 4-5 times a day and/or when sitting. When taking antibiotics take entire prescription as ordered by physician do not stop taking until medicine is all gone. Orders for this week: 8/3/22                  [facility] [address] [place] [address] in [facility]     Right [facility] (including tip) - Wash with soap and water, pat dry. Apply Gentamicin and Stimulen powder to wound bed. Cover with ca alginate and folded 4x4 gauze. Secure with 1\" coban. Change daily. Follow up with Aung Dyson CNP in 1 week in the wound care center. Call [phone] for any questions or concerns.   Date__________   Time____________        Treatment Note      Written Patient Dismissal Instructions Given            Electronically signed by CARMEN López CNP on 8/3/2022 at 1:29 PM

## 2023-12-22 ENCOUNTER — OFFICE VISIT (OUTPATIENT)
Dept: FAMILY MEDICINE | Facility: CLINIC | Age: 28
End: 2023-12-22

## 2023-12-22 VITALS
OXYGEN SATURATION: 98 % | RESPIRATION RATE: 17 BRPM | HEART RATE: 98 BPM | DIASTOLIC BLOOD PRESSURE: 78 MMHG | WEIGHT: 169 LBS | TEMPERATURE: 98 F | SYSTOLIC BLOOD PRESSURE: 113 MMHG

## 2023-12-22 DIAGNOSIS — R11.2 NAUSEA AND VOMITING, UNSPECIFIED VOMITING TYPE: ICD-10-CM

## 2023-12-22 DIAGNOSIS — Z20.828 CONTACT WITH AND (SUSPECTED) EXPOSURE TO OTHER VIRAL COMMUNICABLE DISEASES: Primary | ICD-10-CM

## 2023-12-22 DIAGNOSIS — R19.7 DIARRHEA, UNSPECIFIED TYPE: ICD-10-CM

## 2023-12-22 LAB
CTP QC/QA: YES
FLUAV AG NPH QL: NEGATIVE
FLUBV AG NPH QL: NEGATIVE
S PYO RRNA THROAT QL PROBE: NEGATIVE
SARS-COV-2 AG RESP QL IA.RAPID: NEGATIVE

## 2023-12-22 PROCEDURE — 99203 OFFICE O/P NEW LOW 30 MIN: CPT | Mod: ,,, | Performed by: NURSE PRACTITIONER

## 2023-12-22 PROCEDURE — 87880 STREP A ASSAY W/OPTIC: CPT | Mod: QW,,, | Performed by: NURSE PRACTITIONER

## 2023-12-22 PROCEDURE — 87880 POCT RAPID STREP A: ICD-10-PCS | Mod: QW,,, | Performed by: NURSE PRACTITIONER

## 2023-12-22 PROCEDURE — 87426 SARS CORONAVIRUS 2 ANTIGEN POCT: ICD-10-PCS | Mod: QW,,, | Performed by: NURSE PRACTITIONER

## 2023-12-22 PROCEDURE — 87426 SARSCOV CORONAVIRUS AG IA: CPT | Mod: QW,,, | Performed by: NURSE PRACTITIONER

## 2023-12-22 PROCEDURE — 87804 INFLUENZA ASSAY W/OPTIC: CPT | Mod: 59,QW,, | Performed by: NURSE PRACTITIONER

## 2023-12-22 PROCEDURE — 99203 PR OFFICE/OUTPT VISIT, NEW, LEVL III, 30-44 MIN: ICD-10-PCS | Mod: ,,, | Performed by: NURSE PRACTITIONER

## 2023-12-22 PROCEDURE — 87804 POCT INFLUENZA A/B: ICD-10-PCS | Mod: 59,QW,, | Performed by: NURSE PRACTITIONER

## 2023-12-22 RX ORDER — GLUCOSAMINE/CHONDR SU A SOD 750-600 MG
1 TABLET ORAL DAILY
COMMUNITY

## 2023-12-22 RX ORDER — EMPAGLIFLOZIN AND METFORMIN HYDROCHLORIDE 12.5; 1 MG/1; MG/1
1 TABLET ORAL 2 TIMES DAILY
COMMUNITY

## 2023-12-22 RX ORDER — INSULIN GLARGINE 100 [IU]/ML
INJECTION, SOLUTION SUBCUTANEOUS
COMMUNITY
Start: 2023-07-31

## 2023-12-22 RX ORDER — BUSPIRONE HYDROCHLORIDE 10 MG/1
10 TABLET ORAL 3 TIMES DAILY
COMMUNITY

## 2023-12-22 RX ORDER — ACETAMINOPHEN 500 MG
2000 TABLET ORAL
COMMUNITY

## 2023-12-22 RX ORDER — ZINC GLUCONATE 50 MG
50 TABLET ORAL
COMMUNITY

## 2023-12-22 RX ORDER — IBUPROFEN 100 MG/5ML
1000 SUSPENSION, ORAL (FINAL DOSE FORM) ORAL
COMMUNITY

## 2023-12-22 RX ORDER — TIRZEPATIDE 2.5 MG/.5ML
2.5 INJECTION, SOLUTION SUBCUTANEOUS
COMMUNITY

## 2023-12-22 RX ORDER — LISINOPRIL 5 MG/1
5 TABLET ORAL
COMMUNITY

## 2023-12-22 RX ORDER — TRAZODONE HYDROCHLORIDE 100 MG/1
100 TABLET ORAL
COMMUNITY
Start: 2023-06-05

## 2023-12-22 RX ORDER — BUDESONIDE AND FORMOTEROL FUMARATE DIHYDRATE 80; 4.5 UG/1; UG/1
2 AEROSOL RESPIRATORY (INHALATION) 2 TIMES DAILY
COMMUNITY

## 2023-12-22 RX ORDER — NORGESTIMATE AND ETHINYL ESTRADIOL 7DAYSX3 LO
1 KIT ORAL DAILY
COMMUNITY
Start: 2023-08-29

## 2023-12-22 RX ORDER — ATORVASTATIN CALCIUM 10 MG/1
1 TABLET, FILM COATED ORAL DAILY
COMMUNITY
Start: 2023-07-17

## 2023-12-22 RX ORDER — PROMETHAZINE HYDROCHLORIDE 25 MG/1
25 TABLET ORAL 4 TIMES DAILY
COMMUNITY
Start: 2023-12-21

## 2023-12-22 RX ORDER — METOCLOPRAMIDE 10 MG/1
1 TABLET ORAL 3 TIMES DAILY
COMMUNITY
Start: 2023-07-31

## 2023-12-22 RX ORDER — INSULIN HUMAN 100 [IU]/ML
INJECTION, SOLUTION PARENTERAL
COMMUNITY
Start: 2023-08-29

## 2023-12-22 NOTE — PROGRESS NOTES
Subjective:       Patient ID: Francesca Munoz is a 28 y.o. female.    Chief Complaint: COVID-19 Concerns (Diarrhea, vomiting, chills, loss of taste and smell for 4 days. Unsure about fever. )    COVID-19 Concerns (Diarrhea, vomiting, chills, loss of taste and smell for 4 days) pt has a hx of gastroparesis      Review of Systems   Constitutional:  Positive for chills. Negative for appetite change, fatigue and fever.   HENT:  Negative for nasal congestion, ear pain and sore throat.    Eyes:  Negative for pain, discharge and itching.   Respiratory:  Negative for cough and shortness of breath.    Cardiovascular:  Negative for chest pain and leg swelling.   Gastrointestinal:  Positive for diarrhea, nausea and vomiting. Negative for abdominal pain and change in bowel habit.   Musculoskeletal:  Negative for back pain, gait problem and neck pain.   Integumentary:  Negative for rash and wound.   Allergic/Immunologic: Negative for immunocompromised state.   Neurological:  Negative for dizziness, weakness and headaches.   All other systems reviewed and are negative.        Objective:      Physical Exam  Vitals and nursing note reviewed.   Constitutional:       General: She is not in acute distress.     Appearance: Normal appearance. She is not ill-appearing, toxic-appearing or diaphoretic.   HENT:      Head: Normocephalic.      Right Ear: Tympanic membrane, ear canal and external ear normal.      Left Ear: Tympanic membrane, ear canal and external ear normal.      Nose: Congestion present. No rhinorrhea.      Mouth/Throat:      Mouth: Mucous membranes are moist.      Pharynx: No oropharyngeal exudate or posterior oropharyngeal erythema.   Eyes:      General: No scleral icterus.        Right eye: No discharge.         Left eye: No discharge.      Extraocular Movements: Extraocular movements intact.      Conjunctiva/sclera: Conjunctivae normal.      Pupils: Pupils are equal, round, and reactive to light.   Cardiovascular:       Rate and Rhythm: Normal rate and regular rhythm.      Pulses: Normal pulses.      Heart sounds: Normal heart sounds. No murmur heard.  Pulmonary:      Effort: Pulmonary effort is normal. No respiratory distress.      Breath sounds: Normal breath sounds. No wheezing, rhonchi or rales.   Abdominal:      Palpations: Abdomen is soft.      Tenderness: There is no abdominal tenderness. There is no right CVA tenderness, left CVA tenderness, guarding or rebound.   Musculoskeletal:         General: Normal range of motion.      Cervical back: Neck supple. No tenderness.   Lymphadenopathy:      Cervical: No cervical adenopathy.   Skin:     General: Skin is warm and dry.      Capillary Refill: Capillary refill takes less than 2 seconds.      Findings: No rash.   Neurological:      Mental Status: She is alert and oriented to person, place, and time.   Psychiatric:         Mood and Affect: Mood normal.         Behavior: Behavior normal.         Thought Content: Thought content normal.         Judgment: Judgment normal.         Office Visit on 12/22/2023   Component Date Value Ref Range Status    SARS Coronavirus 2 Antigen 12/22/2023 Negative  Negative Final     Acceptable 12/22/2023 Yes   Final    Rapid Influenza A Ag 12/22/2023 Negative  Negative Final    Rapid Influenza B Ag 12/22/2023 Negative  Negative Final     Acceptable 12/22/2023 Yes   Final    Rapid Strep A Screen 12/22/2023 Negative  Negative, Positive Slide, Positive Tube Final     Acceptable 12/22/2023 Yes   Final      Assessment:       1. Contact with and (suspected) exposure to other viral communicable diseases    2. Nausea and vomiting, unspecified vomiting type    3. Diarrhea, unspecified type        Plan:   Contact with and (suspected) exposure to other viral communicable diseases  -     SARS Coronavirus 2 Antigen, POCT  -     POCT Influenza A/B  -     POCT rapid strep A    Nausea and vomiting, unspecified vomiting  type    Diarrhea, unspecified type       Tke phenergan as prescribed by your provider yesterday    Risks, benefits, and side effects were discussed with the patient. All questions were answered to the fullest satisfaction of the patient, and pt verbalized understanding and agreement to treatment plan. Pt was to call with any new or worsening symptoms, or present to the ER